# Patient Record
Sex: MALE | Race: WHITE | Employment: OTHER | URBAN - METROPOLITAN AREA
[De-identification: names, ages, dates, MRNs, and addresses within clinical notes are randomized per-mention and may not be internally consistent; named-entity substitution may affect disease eponyms.]

---

## 2017-11-01 ENCOUNTER — OFFICE VISIT (OUTPATIENT)
Dept: INTERNAL MEDICINE CLINIC | Age: 72
End: 2017-11-01
Payer: MEDICARE

## 2017-11-01 VITALS
DIASTOLIC BLOOD PRESSURE: 76 MMHG | BODY MASS INDEX: 27.2 KG/M2 | WEIGHT: 190 LBS | SYSTOLIC BLOOD PRESSURE: 118 MMHG | HEIGHT: 70 IN

## 2017-11-01 DIAGNOSIS — Z00.00 ROUTINE GENERAL MEDICAL EXAMINATION AT A HEALTH CARE FACILITY: ICD-10-CM

## 2017-11-01 DIAGNOSIS — Z23 NEED FOR VACCINATION: Primary | ICD-10-CM

## 2017-11-01 DIAGNOSIS — E78.5 HYPERLIPIDEMIA LDL GOAL <130: ICD-10-CM

## 2017-11-01 PROCEDURE — 90732 PPSV23 VACC 2 YRS+ SUBQ/IM: CPT | Performed by: INTERNAL MEDICINE

## 2017-11-01 PROCEDURE — G0009 ADMIN PNEUMOCOCCAL VACCINE: HCPCS | Performed by: INTERNAL MEDICINE

## 2017-11-01 PROCEDURE — 90662 IIV NO PRSV INCREASED AG IM: CPT | Performed by: INTERNAL MEDICINE

## 2017-11-01 PROCEDURE — G0008 ADMIN INFLUENZA VIRUS VAC: HCPCS | Performed by: INTERNAL MEDICINE

## 2017-11-01 PROCEDURE — G0438 PPPS, INITIAL VISIT: HCPCS | Performed by: INTERNAL MEDICINE

## 2017-11-01 RX ORDER — CLOTRIMAZOLE AND BETAMETHASONE DIPROPIONATE 10; .64 MG/G; MG/G
CREAM TOPICAL
Qty: 15 G | Refills: 0 | Status: SHIPPED | OUTPATIENT
Start: 2017-11-01 | End: 2018-06-20 | Stop reason: ALTCHOICE

## 2017-11-01 RX ORDER — ATORVASTATIN CALCIUM 10 MG/1
10 TABLET, FILM COATED ORAL DAILY
Qty: 30 TABLET | Refills: 3 | Status: SHIPPED | OUTPATIENT
Start: 2017-11-01 | End: 2018-02-06 | Stop reason: SDUPTHER

## 2017-11-01 ASSESSMENT — LIFESTYLE VARIABLES
HAVE YOU OR SOMEONE ELSE BEEN INJURED AS A RESULT OF YOUR DRINKING: 0
HOW OFTEN DURING THE LAST YEAR HAVE YOU HAD A FEELING OF GUILT OR REMORSE AFTER DRINKING: 0
HOW MANY STANDARD DRINKS CONTAINING ALCOHOL DO YOU HAVE ON A TYPICAL DAY: 0
HOW OFTEN DURING THE LAST YEAR HAVE YOU BEEN UNABLE TO REMEMBER WHAT HAPPENED THE NIGHT BEFORE BECAUSE YOU HAD BEEN DRINKING: 0
HOW OFTEN DO YOU HAVE A DRINK CONTAINING ALCOHOL: 2
HOW OFTEN DURING THE LAST YEAR HAVE YOU FAILED TO DO WHAT WAS NORMALLY EXPECTED FROM YOU BECAUSE OF DRINKING: 0
HAS A RELATIVE, FRIEND, DOCTOR, OR ANOTHER HEALTH PROFESSIONAL EXPRESSED CONCERN ABOUT YOUR DRINKING OR SUGGESTED YOU CUT DOWN: 0
HOW OFTEN DURING THE LAST YEAR HAVE YOU FOUND THAT YOU WERE NOT ABLE TO STOP DRINKING ONCE YOU HAD STARTED: 0
HOW OFTEN DO YOU HAVE SIX OR MORE DRINKS ON ONE OCCASION: 0
AUDIT-C TOTAL SCORE: 2
AUDIT TOTAL SCORE: 2
HOW OFTEN DURING THE LAST YEAR HAVE YOU NEEDED AN ALCOHOLIC DRINK FIRST THING IN THE MORNING TO GET YOURSELF GOING AFTER A NIGHT OF HEAVY DRINKING: 0

## 2017-11-01 ASSESSMENT — ANXIETY QUESTIONNAIRES: GAD7 TOTAL SCORE: 0

## 2017-11-01 ASSESSMENT — PATIENT HEALTH QUESTIONNAIRE - PHQ9: SUM OF ALL RESPONSES TO PHQ QUESTIONS 1-9: 0

## 2017-11-01 NOTE — PROGRESS NOTES
Subjective:      Patient ID: Elaine Ceja is a 71 y.o. male. Chronic Disease Visit Information    BP Readings from Last 3 Encounters:   06/15/16 126/84   05/31/16 130/82   08/27/15 116/78          LDL Cholesterol (mg/dL)   Date Value   08/27/2015 180 (H)     HDL (mg/dL)   Date Value   08/27/2015 63     BUN (mg/dL)   Date Value   08/27/2015 17     CREATININE (mg/dL)   Date Value   08/27/2015 0.75     Glucose (mg/dL)   Date Value   08/27/2015 89            Have you changed or started any medications since your last visit including any over-the-counter medicines, vitamins, or herbal medicines? no   Are you having any side effects from any of your medications? -  no  Have you stopped taking any of your medications? Is so, why? -  no    Have you seen any other physician or provider since your last visit? No  Have you had any other diagnostic tests since your last visit? No  Have you been seen in the emergency room and/or had an admission to a hospital since we last saw you? No  Have you had your annual diabetic retinal (eye) exam? Yes - Records Obtained  Have you had your routine dental cleaning in the past 6 months? no    Have you activated your [employer] account? If not, what are your barriers?  Yes     Patient Care Team:  Lucas Williamson MD as PCP - General  Fahad Hendrix MD as PCP - S Attributed Provider  Kenna Madden MD (Gastroenterology)  Fahad Hendrix MD (Internal Medicine)         Medical History Review  Past Medical, Family, and Social History reviewed and does contribute to the patient presenting condition    Health Maintenance   Topic Date Due    Hepatitis C screen  1945    Zostavax vaccine  09/23/2006    Pneumococcal low/med risk (2 of 2 - PPSV23) 06/15/2017    Flu vaccine (1) 09/01/2017    DTaP/Tdap/Td vaccine (1 - Tdap) 11/01/2018 (Originally 9/23/1965)    Lipid screen  08/27/2020    Colon cancer screen colonoscopy  11/15/2022       HPI    Review of Systems    Objective: coordination and speech normal    Patient's complete Health Risk Assessment and screening values have been reviewed and are found in Flowsheets. The following problems were reviewed today and where indicated follow up appointments were made and/or referrals ordered. Positive Risk Factor Screenings with Interventions:     General Health:  General  In general, how would you say your health is?: Excellent  In the past 7 days, have you experienced any of the following?: None of These  Do you get the social and emotional support that you need?: Yes  Do you have a Living Will?: (!) No  General Health Risk Interventions:      Health Habits/Nutrition:  Health Habits/Nutrition  Do you exercise for at least 20 minutes 2-3 times per week?: Yes  Have you lost any weight without trying in the past 3 months?: No  Do you eat fewer than 2 meals per day?: No  Have you seen a dentist within the past year?: (!) No  Body mass index is 26.9 kg/m². Health Habits/Nutrition Interventions:  · None indicated      Personalized Preventive Plan   Current Health Maintenance Status  Immunization History   Administered Date(s) Administered    Influenza, High Dose 11/01/2017    Pneumococcal 13-valent Conjugate ([id number]) 06/15/2016    Pneumococcal Polysaccharide ([id number]) 11/01/2017        Health Maintenance   Topic Date Due    Hepatitis C screen  1945    Zostavax vaccine  09/23/2006    DTaP/Tdap/Td vaccine (1 - Tdap) 11/01/2018 (Originally 9/23/1965)    Lipid screen  08/27/2020    Colon cancer screen colonoscopy  11/15/2022    Flu vaccine  Completed    Pneumococcal low/med risk  Completed     Recommendations for Preventive Services Due: see orders.   Recommended screening schedule for the next 5-10 years is provided to the patient in written form: see Patient Instructions/AVS.

## 2017-11-26 NOTE — PATIENT INSTRUCTIONS
Personalized Preventive Plan for Yoav Kendall - 11/1/2017  Medicare offers a range of preventive health benefits. Some of the tests and screenings are paid in full while other may be subject to a deductible, co-insurance, and/or copay. Some of these benefits include a comprehensive review of your medical history including lifestyle, illnesses that may run in your family, and various assessments and screenings as appropriate. After reviewing your medical record and screening and assessments performed today your provider may have ordered immunizations, labs, imaging, and/or referrals for you. A list of these orders (if applicable) as well as your Preventive Care list are included within your After Visit Summary for your review. Other Preventive Recommendations:    · A preventive eye exam performed by an eye specialist is recommended every 1-2 years to screen for glaucoma; cataracts, macular degeneration, and other eye disorders. · A preventive dental visit is recommended every 6 months. · Try to get at least 150 minutes of exercise per week or 10,000 steps per day on a pedometer . · Order or download the FREE \"Exercise & Physical Activity: Your Everyday Guide\" from [employer]. Call 6-854.836.9244 or search [employer] online. · You need 1493-9581 mg of calcium and 7912-7139 IU of vitamin D per day. It is possible to meet your calcium requirement with diet alone, but a vitamin D supplement is usually necessary to meet this goal.  · When exposed to the sun, use a sunscreen that protects against both UVA and UVB radiation with an SPF of 30 or greater. Reapply every 2 to 3 hours or after sweating, drying off with a towel, or swimming. · Always wear a seat belt when traveling in a car. Always wear a helmet when riding a bicycle or motorcycle. Personalized Preventive Plan for Yoav Kendall - 11/1/2017  Medicare offers a range of preventive health benefits.  Some of the tests and screenings are paid in full while other may be subject to a deductible, co-insurance, and/or copay. Some of these benefits include a comprehensive review of your medical history including lifestyle, illnesses that may run in your family, and various assessments and screenings as appropriate. After reviewing your medical record and screening and assessments performed today your provider may have ordered immunizations, labs, imaging, and/or referrals for you. A list of these orders (if applicable) as well as your Preventive Care list are included within your After Visit Summary for your review. Other Preventive Recommendations:    · A preventive eye exam performed by an eye specialist is recommended every 1-2 years to screen for glaucoma; cataracts, macular degeneration, and other eye disorders. · A preventive dental visit is recommended every 6 months. · Try to get at least 150 minutes of exercise per week or 10,000 steps per day on a pedometer . · Order or download the FREE \"Exercise & Physical Activity: Your Everyday Guide\" from [employer]. Call 8-399.517.3071 or search [employer] online. · You need 7267-3384 mg of calcium and 8976-1368 IU of vitamin D per day. It is possible to meet your calcium requirement with diet alone, but a vitamin D supplement is usually necessary to meet this goal.  · When exposed to the sun, use a sunscreen that protects against both UVA and UVB radiation with an SPF of 30 or greater. Reapply every 2 to 3 hours or after sweating, drying off with a towel, or swimming. · Always wear a seat belt when traveling in a car. Always wear a helmet when riding a bicycle or motorcycle.

## 2017-12-04 ENCOUNTER — HOSPITAL ENCOUNTER (OUTPATIENT)
Age: 72
Setting detail: SPECIMEN
Discharge: HOME | End: 2017-12-04
Payer: MEDICARE

## 2017-12-04 DIAGNOSIS — E78.5 HYPERLIPIDEMIA LDL GOAL <130: ICD-10-CM

## 2017-12-04 LAB
CHOLESTEROL/HDL RATIO: 2.4
CHOLESTEROL: 168 MG/DL
HDLC SERPL-MCNC: 71 MG/DL
LDL CHOLESTEROL: 76 MG/DL (ref 0–130)
TRIGL SERPL-MCNC: 104 MG/DL
VLDLC SERPL CALC-MCNC: NORMAL MG/DL (ref 1–30)

## 2018-01-29 ENCOUNTER — HOSPITAL ENCOUNTER (OUTPATIENT)
Age: 73
Setting detail: SPECIMEN
Discharge: HOME | End: 2018-01-29
Payer: MEDICARE

## 2018-01-29 DIAGNOSIS — E78.5 HYPERLIPIDEMIA LDL GOAL <130: ICD-10-CM

## 2018-01-29 LAB
ALBUMIN SERPL-MCNC: 4 G/DL (ref 3.5–5.2)
ALBUMIN/GLOBULIN RATIO: 1.2 (ref 1–2.5)
ALP BLD-CCNC: 88 U/L (ref 40–129)
ALT SERPL-CCNC: 21 U/L (ref 5–41)
ANION GAP SERPL CALCULATED.3IONS-SCNC: 9 MMOL/L (ref 9–17)
AST SERPL-CCNC: 20 U/L
BILIRUB SERPL-MCNC: 0.67 MG/DL (ref 0.3–1.2)
BUN BLDV-MCNC: 13 MG/DL (ref 8–23)
BUN/CREAT BLD: ABNORMAL (ref 9–20)
CALCIUM SERPL-MCNC: 8.5 MG/DL (ref 8.6–10.4)
CHLORIDE BLD-SCNC: 103 MMOL/L (ref 98–107)
CO2: 27 MMOL/L (ref 20–31)
CREAT SERPL-MCNC: 0.75 MG/DL (ref 0.7–1.2)
GFR AFRICAN AMERICAN: >60 ML/MIN
GFR NON-AFRICAN AMERICAN: >60 ML/MIN
GFR SERPL CREATININE-BSD FRML MDRD: ABNORMAL ML/MIN/{1.73_M2}
GFR SERPL CREATININE-BSD FRML MDRD: ABNORMAL ML/MIN/{1.73_M2}
GLUCOSE BLD-MCNC: 92 MG/DL (ref 70–99)
POTASSIUM SERPL-SCNC: 4.2 MMOL/L (ref 3.7–5.3)
SODIUM BLD-SCNC: 139 MMOL/L (ref 135–144)
TOTAL PROTEIN: 7.4 G/DL (ref 6.4–8.3)

## 2018-02-06 DIAGNOSIS — E78.5 HYPERLIPIDEMIA LDL GOAL <130: ICD-10-CM

## 2018-02-06 RX ORDER — ATORVASTATIN CALCIUM 10 MG/1
10 TABLET, FILM COATED ORAL DAILY
Qty: 90 TABLET | Refills: 3 | Status: SHIPPED | OUTPATIENT
Start: 2018-02-06 | End: 2018-02-12 | Stop reason: SDUPTHER

## 2018-02-12 DIAGNOSIS — E78.5 HYPERLIPIDEMIA LDL GOAL <130: ICD-10-CM

## 2018-02-12 NOTE — TELEPHONE ENCOUNTER
Medication: Atrovastatin  Last visit: 11/01/2017  Next visit: Visit date not found  Last refill: 2/6/2018  Pharmacy: [facility]

## 2018-02-13 RX ORDER — ATORVASTATIN CALCIUM 10 MG/1
10 TABLET, FILM COATED ORAL DAILY
Qty: 90 TABLET | Refills: 3 | Status: SHIPPED | OUTPATIENT
Start: 2018-02-13 | End: 2019-03-07 | Stop reason: SDUPTHER

## 2018-06-20 ENCOUNTER — OFFICE VISIT (OUTPATIENT)
Dept: INTERNAL MEDICINE CLINIC | Age: 73
End: 2018-06-20
Payer: MEDICARE

## 2018-06-20 VITALS
BODY MASS INDEX: 27.21 KG/M2 | HEIGHT: 70 IN | SYSTOLIC BLOOD PRESSURE: 130 MMHG | WEIGHT: 190.04 LBS | DIASTOLIC BLOOD PRESSURE: 76 MMHG

## 2018-06-20 DIAGNOSIS — H81.13 BENIGN PAROXYSMAL POSITIONAL VERTIGO DUE TO BILATERAL VESTIBULAR DISORDER: Primary | ICD-10-CM

## 2018-06-20 PROCEDURE — 3017F COLORECTAL CA SCREEN DOC REV: CPT | Performed by: INTERNAL MEDICINE

## 2018-06-20 PROCEDURE — G8419 CALC BMI OUT NRM PARAM NOF/U: HCPCS | Performed by: INTERNAL MEDICINE

## 2018-06-20 PROCEDURE — 99213 OFFICE O/P EST LOW 20 MIN: CPT | Performed by: INTERNAL MEDICINE

## 2018-06-20 PROCEDURE — 1123F ACP DISCUSS/DSCN MKR DOCD: CPT | Performed by: INTERNAL MEDICINE

## 2018-06-20 PROCEDURE — 1036F TOBACCO NON-USER: CPT | Performed by: INTERNAL MEDICINE

## 2018-06-20 PROCEDURE — G8427 DOCREV CUR MEDS BY ELIG CLIN: HCPCS | Performed by: INTERNAL MEDICINE

## 2018-06-20 PROCEDURE — 4040F PNEUMOC VAC/ADMIN/RCVD: CPT | Performed by: INTERNAL MEDICINE

## 2018-06-20 PROCEDURE — 1101F PT FALLS ASSESS-DOCD LE1/YR: CPT | Performed by: INTERNAL MEDICINE

## 2018-06-20 RX ORDER — MECLIZINE HYDROCHLORIDE CHEWABLE TABLETS 25 MG/1
25 TABLET, CHEWABLE ORAL 3 TIMES DAILY PRN
Qty: 30 TABLET | Refills: 0 | Status: SHIPPED | OUTPATIENT
Start: 2018-06-20 | End: 2018-06-30

## 2018-08-08 ENCOUNTER — OFFICE VISIT (OUTPATIENT)
Dept: INTERNAL MEDICINE CLINIC | Age: 73
End: 2018-08-08
Payer: MEDICARE

## 2018-08-08 ENCOUNTER — HOSPITAL ENCOUNTER (OUTPATIENT)
Age: 73
Setting detail: SPECIMEN
Discharge: HOME | End: 2018-08-08
Payer: MEDICARE

## 2018-08-08 ENCOUNTER — HOSPITAL ENCOUNTER (OUTPATIENT)
Facility: CLINIC | Age: 73
Discharge: HOME | End: 2018-08-10
Payer: MEDICARE

## 2018-08-08 ENCOUNTER — HOSPITAL ENCOUNTER (OUTPATIENT)
Dept: GENERAL RADIOLOGY | Facility: CLINIC | Age: 73
Discharge: HOME | End: 2018-08-10
Payer: MEDICARE

## 2018-08-08 VITALS
SYSTOLIC BLOOD PRESSURE: 158 MMHG | HEIGHT: 70 IN | WEIGHT: 189 LBS | DIASTOLIC BLOOD PRESSURE: 86 MMHG | BODY MASS INDEX: 27.06 KG/M2 | RESPIRATION RATE: 14 BRPM

## 2018-08-08 DIAGNOSIS — E78.5 DYSLIPIDEMIA: ICD-10-CM

## 2018-08-08 DIAGNOSIS — J13 PNEUMONIA OF LEFT LOWER LOBE DUE TO STREPTOCOCCUS PNEUMONIAE (HCC): ICD-10-CM

## 2018-08-08 LAB
ABSOLUTE EOS #: 0.45 K/UL (ref 0–0.44)
ABSOLUTE IMMATURE GRANULOCYTE: 0.03 K/UL (ref 0–0.3)
ABSOLUTE LYMPH #: 1.79 K/UL (ref 1.1–3.7)
ABSOLUTE MONO #: 0.9 K/UL (ref 0.1–1.2)
BASOPHILS # BLD: 1 % (ref 0–2)
BASOPHILS ABSOLUTE: 0.07 K/UL (ref 0–0.2)
DIFFERENTIAL TYPE: ABNORMAL
EOSINOPHILS RELATIVE PERCENT: 5 % (ref 1–4)
HCT VFR BLD CALC: 47.7 % (ref 40.7–50.3)
HEMOGLOBIN: 15.6 G/DL (ref 13–17)
IMMATURE GRANULOCYTES: 0 %
LYMPHOCYTES # BLD: 21 % (ref 24–43)
MCH RBC QN AUTO: 30.5 PG (ref 25.2–33.5)
MCHC RBC AUTO-ENTMCNC: 32.7 G/DL (ref 28.4–34.8)
MCV RBC AUTO: 93.3 FL (ref 82.6–102.9)
MONOCYTES # BLD: 10 % (ref 3–12)
NRBC AUTOMATED: 0 PER 100 WBC
PDW BLD-RTO: 12.9 % (ref 11.8–14.4)
PLATELET # BLD: 232 K/UL (ref 138–453)
PLATELET ESTIMATE: ABNORMAL
PMV BLD AUTO: 10.5 FL (ref 8.1–13.5)
RBC # BLD: 5.11 M/UL (ref 4.21–5.77)
RBC # BLD: ABNORMAL 10*6/UL
SEDIMENTATION RATE, ERYTHROCYTE: 1 MM (ref 0–10)
SEG NEUTROPHILS: 63 % (ref 36–65)
SEGMENTED NEUTROPHILS ABSOLUTE COUNT: 5.42 K/UL (ref 1.5–8.1)
WBC # BLD: 8.7 K/UL (ref 3.5–11.3)
WBC # BLD: ABNORMAL 10*3/UL

## 2018-08-08 PROCEDURE — 71046 X-RAY EXAM CHEST 2 VIEWS: CPT

## 2018-08-08 PROCEDURE — 99213 OFFICE O/P EST LOW 20 MIN: CPT | Performed by: INTERNAL MEDICINE

## 2018-08-08 PROCEDURE — G8419 CALC BMI OUT NRM PARAM NOF/U: HCPCS | Performed by: INTERNAL MEDICINE

## 2018-08-08 PROCEDURE — G8427 DOCREV CUR MEDS BY ELIG CLIN: HCPCS | Performed by: INTERNAL MEDICINE

## 2018-08-08 PROCEDURE — 1036F TOBACCO NON-USER: CPT | Performed by: INTERNAL MEDICINE

## 2018-08-08 PROCEDURE — 1101F PT FALLS ASSESS-DOCD LE1/YR: CPT | Performed by: INTERNAL MEDICINE

## 2018-08-08 PROCEDURE — 1123F ACP DISCUSS/DSCN MKR DOCD: CPT | Performed by: INTERNAL MEDICINE

## 2018-08-08 PROCEDURE — 3017F COLORECTAL CA SCREEN DOC REV: CPT | Performed by: INTERNAL MEDICINE

## 2018-08-08 PROCEDURE — 4040F PNEUMOC VAC/ADMIN/RCVD: CPT | Performed by: INTERNAL MEDICINE

## 2018-08-08 RX ORDER — BENZONATATE 200 MG/1
200 CAPSULE ORAL 3 TIMES DAILY PRN
Qty: 15 CAPSULE | Refills: 0 | Status: SHIPPED | OUTPATIENT
Start: 2018-08-08 | End: 2018-08-13

## 2018-08-08 RX ORDER — LEVOFLOXACIN 500 MG/1
500 TABLET, FILM COATED ORAL DAILY
Qty: 10 TABLET | Refills: 0 | Status: SHIPPED | OUTPATIENT
Start: 2018-08-08 | End: 2018-08-18

## 2018-08-08 ASSESSMENT — ENCOUNTER SYMPTOMS
ALLERGIC/IMMUNOLOGIC NEGATIVE: 1
GASTROINTESTINAL NEGATIVE: 1
COUGH: 1
EYES NEGATIVE: 1

## 2018-08-08 NOTE — PROGRESS NOTES
Date:   8/8/2019    Sedimentation rate, automated     Standing Status:   Future     Standing Expiration Date:   8/8/2019     Orders Placed This Encounter   Medications    levofloxacin (LEVAQUIN) 500 MG tablet     Sig: Take 1 tablet by mouth daily for 10 days     Dispense:  10 tablet     Refill:  0    benzonatate (TESSALON) 200 MG capsule     Sig: Take 1 capsule by mouth 3 times daily as needed for Cough     Dispense:  15 capsule     Refill:  0

## 2019-01-04 ENCOUNTER — OFFICE VISIT (OUTPATIENT)
Dept: FAMILY MEDICINE CLINIC | Age: 74
End: 2019-01-04
Payer: MEDICARE

## 2019-01-04 VITALS
BODY MASS INDEX: 27.75 KG/M2 | HEIGHT: 70 IN | OXYGEN SATURATION: 98 % | HEART RATE: 72 BPM | DIASTOLIC BLOOD PRESSURE: 72 MMHG | WEIGHT: 193.8 LBS | SYSTOLIC BLOOD PRESSURE: 114 MMHG

## 2019-01-04 DIAGNOSIS — E78.5 DYSLIPIDEMIA: Primary | ICD-10-CM

## 2019-01-04 DIAGNOSIS — Z23 NEEDS FLU SHOT: ICD-10-CM

## 2019-01-04 LAB
ALBUMIN SERPL-MCNC: 4.5 G/DL (ref 3.4–5)
ALP BLD-CCNC: 94 U/L (ref 40–129)
ALT SERPL-CCNC: 41 U/L (ref 10–40)
AST SERPL-CCNC: 25 U/L (ref 15–37)
BILIRUB SERPL-MCNC: 0.7 MG/DL (ref 0–1)
BILIRUBIN DIRECT: <0.2 MG/DL (ref 0–0.3)
BILIRUBIN, INDIRECT: ABNORMAL MG/DL (ref 0–1)
CHOLESTEROL, TOTAL: 131 MG/DL (ref 0–199)
HDLC SERPL-MCNC: 42 MG/DL (ref 40–60)
LDL CHOLESTEROL CALCULATED: 71 MG/DL
TOTAL PROTEIN: 7.5 G/DL (ref 6.4–8.2)
TRIGL SERPL-MCNC: 92 MG/DL (ref 0–150)
VLDLC SERPL CALC-MCNC: 18 MG/DL

## 2019-01-04 PROCEDURE — 36415 COLL VENOUS BLD VENIPUNCTURE: CPT | Performed by: FAMILY MEDICINE

## 2019-01-04 PROCEDURE — 1036F TOBACCO NON-USER: CPT | Performed by: FAMILY MEDICINE

## 2019-01-04 PROCEDURE — 99202 OFFICE O/P NEW SF 15 MIN: CPT | Performed by: FAMILY MEDICINE

## 2019-01-04 PROCEDURE — 90662 IIV NO PRSV INCREASED AG IM: CPT | Performed by: FAMILY MEDICINE

## 2019-01-04 PROCEDURE — 1123F ACP DISCUSS/DSCN MKR DOCD: CPT | Performed by: FAMILY MEDICINE

## 2019-01-04 PROCEDURE — 3017F COLORECTAL CA SCREEN DOC REV: CPT | Performed by: FAMILY MEDICINE

## 2019-01-04 PROCEDURE — G8482 FLU IMMUNIZE ORDER/ADMIN: HCPCS | Performed by: FAMILY MEDICINE

## 2019-01-04 PROCEDURE — 1101F PT FALLS ASSESS-DOCD LE1/YR: CPT | Performed by: FAMILY MEDICINE

## 2019-01-04 PROCEDURE — G8419 CALC BMI OUT NRM PARAM NOF/U: HCPCS | Performed by: FAMILY MEDICINE

## 2019-01-04 PROCEDURE — 4040F PNEUMOC VAC/ADMIN/RCVD: CPT | Performed by: FAMILY MEDICINE

## 2019-01-04 PROCEDURE — G8427 DOCREV CUR MEDS BY ELIG CLIN: HCPCS | Performed by: FAMILY MEDICINE

## 2019-01-04 PROCEDURE — G0008 ADMIN INFLUENZA VIRUS VAC: HCPCS | Performed by: FAMILY MEDICINE

## 2019-01-04 ASSESSMENT — PATIENT HEALTH QUESTIONNAIRE - PHQ9
SUM OF ALL RESPONSES TO PHQ QUESTIONS 1-9: 0
2. FEELING DOWN, DEPRESSED OR HOPELESS: 0
1. LITTLE INTEREST OR PLEASURE IN DOING THINGS: 0
SUM OF ALL RESPONSES TO PHQ9 QUESTIONS 1 & 2: 0
SUM OF ALL RESPONSES TO PHQ QUESTIONS 1-9: 0

## 2019-01-06 ASSESSMENT — ENCOUNTER SYMPTOMS
ABDOMINAL PAIN: 0
SHORTNESS OF BREATH: 0

## 2019-03-06 ENCOUNTER — TELEPHONE (OUTPATIENT)
Dept: FAMILY MEDICINE CLINIC | Age: 74
End: 2019-03-06

## 2019-03-06 DIAGNOSIS — E78.5 HYPERLIPIDEMIA LDL GOAL <130: ICD-10-CM

## 2019-03-07 RX ORDER — ATORVASTATIN CALCIUM 10 MG/1
10 TABLET, FILM COATED ORAL DAILY
Qty: 90 TABLET | Refills: 3 | Status: SHIPPED | OUTPATIENT
Start: 2019-03-07 | End: 2020-03-04 | Stop reason: SDUPTHER

## 2020-01-06 ENCOUNTER — OFFICE VISIT (OUTPATIENT)
Dept: FAMILY MEDICINE CLINIC | Age: 75
End: 2020-01-06
Payer: MEDICARE

## 2020-01-06 ENCOUNTER — TELEPHONE (OUTPATIENT)
Dept: FAMILY MEDICINE CLINIC | Age: 75
End: 2020-01-06

## 2020-01-06 VITALS
HEIGHT: 70 IN | BODY MASS INDEX: 26.8 KG/M2 | HEART RATE: 50 BPM | OXYGEN SATURATION: 98 % | SYSTOLIC BLOOD PRESSURE: 124 MMHG | DIASTOLIC BLOOD PRESSURE: 70 MMHG | WEIGHT: 187.2 LBS

## 2020-01-06 LAB
CHOLESTEROL, TOTAL: 154 MG/DL (ref 0–199)
HDLC SERPL-MCNC: 61 MG/DL (ref 40–60)
LDL CHOLESTEROL CALCULATED: 79 MG/DL
PROSTATE SPECIFIC ANTIGEN: 1.6 NG/ML (ref 0–4)
TRIGL SERPL-MCNC: 68 MG/DL (ref 0–150)
VLDLC SERPL CALC-MCNC: 14 MG/DL

## 2020-01-06 PROCEDURE — 3017F COLORECTAL CA SCREEN DOC REV: CPT | Performed by: FAMILY MEDICINE

## 2020-01-06 PROCEDURE — G8484 FLU IMMUNIZE NO ADMIN: HCPCS | Performed by: FAMILY MEDICINE

## 2020-01-06 PROCEDURE — 36415 COLL VENOUS BLD VENIPUNCTURE: CPT | Performed by: FAMILY MEDICINE

## 2020-01-06 PROCEDURE — 4040F PNEUMOC VAC/ADMIN/RCVD: CPT | Performed by: FAMILY MEDICINE

## 2020-01-06 PROCEDURE — G0439 PPPS, SUBSEQ VISIT: HCPCS | Performed by: FAMILY MEDICINE

## 2020-01-06 PROCEDURE — 1123F ACP DISCUSS/DSCN MKR DOCD: CPT | Performed by: FAMILY MEDICINE

## 2020-01-06 ASSESSMENT — LIFESTYLE VARIABLES
HOW OFTEN DURING THE LAST YEAR HAVE YOU FOUND THAT YOU WERE NOT ABLE TO STOP DRINKING ONCE YOU HAD STARTED: 0
HOW OFTEN DURING THE LAST YEAR HAVE YOU BEEN UNABLE TO REMEMBER WHAT HAPPENED THE NIGHT BEFORE BECAUSE YOU HAD BEEN DRINKING: 0
HOW MANY STANDARD DRINKS CONTAINING ALCOHOL DO YOU HAVE ON A TYPICAL DAY: 0
HAVE YOU OR SOMEONE ELSE BEEN INJURED AS A RESULT OF YOUR DRINKING: 0
HOW OFTEN DURING THE LAST YEAR HAVE YOU NEEDED AN ALCOHOLIC DRINK FIRST THING IN THE MORNING TO GET YOURSELF GOING AFTER A NIGHT OF HEAVY DRINKING: 0
AUDIT TOTAL SCORE: 2
HOW OFTEN DO YOU HAVE A DRINK CONTAINING ALCOHOL: 2
HOW OFTEN DURING THE LAST YEAR HAVE YOU HAD A FEELING OF GUILT OR REMORSE AFTER DRINKING: 0
HAS A RELATIVE, FRIEND, DOCTOR, OR ANOTHER HEALTH PROFESSIONAL EXPRESSED CONCERN ABOUT YOUR DRINKING OR SUGGESTED YOU CUT DOWN: 0
HOW OFTEN DURING THE LAST YEAR HAVE YOU FAILED TO DO WHAT WAS NORMALLY EXPECTED FROM YOU BECAUSE OF DRINKING: 0
AUDIT-C TOTAL SCORE: 2
HOW OFTEN DO YOU HAVE SIX OR MORE DRINKS ON ONE OCCASION: 0

## 2020-01-06 ASSESSMENT — PATIENT HEALTH QUESTIONNAIRE - PHQ9
SUM OF ALL RESPONSES TO PHQ QUESTIONS 1-9: 0
SUM OF ALL RESPONSES TO PHQ QUESTIONS 1-9: 0

## 2020-01-06 NOTE — PROGRESS NOTES
Medicare Annual Wellness Visit  Name: Celso Rodriguez Date: 2020   MRN: <[id number]> Sex: Male   Age: 69 y.o. Ethnicity: Non-/Non    : 1945 Race: Lico Avelar is here for Medicare AWV (pt states that he is here for an annual wellness visit, is fasting for bw )    Screenings for behavioral, psychosocial and functional/safety risks, and cognitive dysfunction are all negative except as indicated below. These results, as well as other patient data from the [address] form, are documented in Flowsheets linked to this Encounter. Allergies   Allergen Reactions    Tramadol Hcl Er Other (See Comments)     Causes more pain that he had before. Prior to Visit Medications    Medication Sig Taking? Authorizing Provider   atorvastatin (LIPITOR) 10 MG tablet Take 1 tablet by mouth daily Yes Carol Brooks MD       Past Medical History:   Diagnosis Date    Elevated blood pressure reading without diagnosis of hypertension        Past Surgical History:   Procedure Laterality Date    FINGER AMPUTATION Right     middle finger    KNEE CARTILAGE SURGERY Left     SHOULDER SURGERY Right     bone spur removed.      THUMB AMPUTATION Left     tip of thumb       Family History   Problem Relation Age of Onset    Heart Disease Mother     High Cholesterol Mother     Heart Disease Father     Heart Disease Maternal Grandmother     High Blood Pressure Brother        CareTeam (Including outside providers/suppliers regularly involved in providing care):   Patient Care Team:  Carol Brooks MD as PCP - General (Family Medicine)  Carol Brooks MD as PCP - [facility] Empaneled Provider  Robbie Feldman MD (Gastroenterology)  Aleksandr Payne MD (Internal Medicine)  KASSI Mchugh - NP (Nurse Practitioner)    Wt Readings from Last 3 Encounters:   20 187 lb 3.2 oz (84.9 kg)   19 193 lb 12.8 oz (87.9 kg)   18 189 lb (85.7 kg)     Vitals:    20 0372 BP: 124/70   Pulse: 50   SpO2: 98%   Weight: 187 lb 3.2 oz (84.9 kg)   Height: 5' 10\" (1.778 m)     Body mass index is 26.86 kg/m². Based upon direct observation of the patient, evaluation of cognition reveals recent and remote memory intact. After Dilaudid - reaction, so kept overnight as precaution, no problems. L thumb healed. Up 0-1 times/night (nocturia), and usually sleeps fair, frequent awakening, usually NOT needing to urinate. Energy good, and walks dog 25 miles/week, about 1-1.5 hrs/day (walks dog twice/day, 3.5 miles). Also morning stretches, and every other morning, 20 situps and 20 pushups. Diet - lowfat, fair amount fruits, limited veggies (lies with son, is single), limited red meat, some sweets, no pop, mainly water, some OJ, Vit C. Flu shots on occasion - defers. L jaw pain at times. Dentist  On regular basis, and Aleve on occasion. General Appearance: alert and oriented to person, place and time, well-developed and well-nourished, in no acute distress  Neck: neck supple and non tender without mass, no thyromegaly or thyroid nodules, no cervical lymphadenopathy   Pulmonary/Chest: clear to auscultation bilaterally- no wheezes, rales or rhonchi, normal air movement, no respiratory distress  Cardiovascular: normal rate, regular rhythm, normal S1 and S2 and intact distal pulses  Abdomen: soft, non-tender, non-distended, normal bowel sounds, no masses or organomegaly  Extremities: no cyanosis, clubbing or edema    Patient's complete Health Risk Assessment and screening values have been reviewed and are found in Flowsheets. The following problems were reviewed today and where indicated follow up appointments were made and/or referrals ordered. Positive Risk Factor Screenings with Interventions:     General Health:  General  In general, how would you say your health is?: Excellent  In the past 7 days, have you experienced any of the following?  New or Increased Pain, New or Increased Fatigue, Loneliness, Social Isolation, Stress or Anger?: None of These  Do you get the social and emotional support that you need?: Yes  Do you have a Living Will?: (!) No  General Health Risk Interventions:  · No Living Will: patient declined    Hearing/Vision:  No exam data present  Hearing/Vision  Do you or your family notice any trouble with your hearing?: No  Do you have difficulty driving, watching TV, or doing any of your daily activities because of your eyesight?: No  Have you had an eye exam within the past year?: (!) No  Hearing/Vision Interventions:  · Vision concerns:  patient declines any further evaluation/treatment for this issue    Personalized Preventive Plan   Current Health Maintenance Status  Immunization History   Administered Date(s) Administered    DTaP vaccine 06/08/2008    Influenza, High Dose (Fluzone 65 yrs and older) 11/01/2017, 01/04/2019    Pneumococcal Conjugate 13-valent ([id number]) 06/15/2016    Pneumococcal Conjugate 7-valent (Prevnar7) 06/08/2008    Pneumococcal Polysaccharide (Gkncjuhno21) 11/01/2017    Tdap (Boostrix, Adacel) 11/04/2019        Health Maintenance   Topic Date Due    Hepatitis C screen  1945    Shingles Vaccine (1 of 2) 09/23/1996    Annual Wellness Visit (AWV)  05/29/2019    Flu vaccine (1) 09/01/2019    Lipid screen  01/04/2020    Colon cancer screen colonoscopy  11/15/2022    DTaP/Tdap/Td vaccine (3 - Td) 11/04/2029    Pneumococcal 65+ years Vaccine  Completed     Recommendations for Preventive Services Due: see orders and patient instructions/AVS.  . Recommended screening schedule for the next 5-10 years is provided to the patient in written form: see Patient Instructions/AVS.    There are no diagnoses linked to this encounter.

## 2020-03-04 RX ORDER — ATORVASTATIN CALCIUM 10 MG/1
10 TABLET, FILM COATED ORAL DAILY
Qty: 90 TABLET | Refills: 0 | Status: SHIPPED | OUTPATIENT
Start: 2020-03-04 | End: 2020-06-02 | Stop reason: SDUPTHER

## 2020-06-02 DIAGNOSIS — E78.5 HYPERLIPIDEMIA LDL GOAL <130: ICD-10-CM

## 2020-06-02 RX ORDER — ATORVASTATIN CALCIUM 10 MG/1
10 TABLET, FILM COATED ORAL DAILY
Qty: 30 TABLET | Refills: 5 | Status: SHIPPED | OUTPATIENT
Start: 2020-06-02 | End: 2020-11-25 | Stop reason: SDUPTHER

## 2020-06-02 NOTE — TELEPHONE ENCOUNTER
Vivek Sawyer [phone] (home)    is requesting refill(s) of medication Atorvastatin  to preferred pharmacy   [address], [address] [facility] 01-06-20 (pertaining to medication)   Last refill 03-04-20 (per medication requested)  Next office visit scheduled or attempted Yes  Date 07-07-20  If No, reason made

## 2020-07-07 ENCOUNTER — OFFICE VISIT (OUTPATIENT)
Dept: FAMILY MEDICINE CLINIC | Age: 75
End: 2020-07-07
Payer: MEDICARE

## 2020-07-07 VITALS
TEMPERATURE: 98.7 F | HEIGHT: 70 IN | OXYGEN SATURATION: 99 % | BODY MASS INDEX: 25.86 KG/M2 | DIASTOLIC BLOOD PRESSURE: 80 MMHG | WEIGHT: 180.6 LBS | HEART RATE: 69 BPM | SYSTOLIC BLOOD PRESSURE: 138 MMHG

## 2020-07-07 LAB
A/G RATIO: 1.6 (ref 1.1–2.2)
ALBUMIN SERPL-MCNC: 4.5 G/DL (ref 3.4–5)
ALP BLD-CCNC: 69 U/L (ref 40–129)
ALT SERPL-CCNC: 19 U/L (ref 10–40)
ANION GAP SERPL CALCULATED.3IONS-SCNC: 12 MMOL/L (ref 3–16)
AST SERPL-CCNC: 21 U/L (ref 15–37)
BILIRUB SERPL-MCNC: 0.9 MG/DL (ref 0–1)
BUN BLDV-MCNC: 22 MG/DL (ref 7–20)
CALCIUM SERPL-MCNC: 9.1 MG/DL (ref 8.3–10.6)
CHLORIDE BLD-SCNC: 102 MMOL/L (ref 99–110)
CHOLESTEROL, TOTAL: 152 MG/DL (ref 0–199)
CO2: 25 MMOL/L (ref 21–32)
CREAT SERPL-MCNC: 0.9 MG/DL (ref 0.8–1.3)
GFR AFRICAN AMERICAN: >60
GFR NON-AFRICAN AMERICAN: >60
GLOBULIN: 2.8 G/DL
GLUCOSE BLD-MCNC: 94 MG/DL (ref 70–99)
HDLC SERPL-MCNC: 61 MG/DL (ref 40–60)
LDL CHOLESTEROL CALCULATED: 74 MG/DL
POTASSIUM SERPL-SCNC: 4.4 MMOL/L (ref 3.5–5.1)
SODIUM BLD-SCNC: 139 MMOL/L (ref 136–145)
TOTAL PROTEIN: 7.3 G/DL (ref 6.4–8.2)
TRIGL SERPL-MCNC: 83 MG/DL (ref 0–150)
VLDLC SERPL CALC-MCNC: 17 MG/DL

## 2020-07-07 PROCEDURE — G8427 DOCREV CUR MEDS BY ELIG CLIN: HCPCS | Performed by: FAMILY MEDICINE

## 2020-07-07 PROCEDURE — 3017F COLORECTAL CA SCREEN DOC REV: CPT | Performed by: FAMILY MEDICINE

## 2020-07-07 PROCEDURE — G8417 CALC BMI ABV UP PARAM F/U: HCPCS | Performed by: FAMILY MEDICINE

## 2020-07-07 PROCEDURE — 4040F PNEUMOC VAC/ADMIN/RCVD: CPT | Performed by: FAMILY MEDICINE

## 2020-07-07 PROCEDURE — 99213 OFFICE O/P EST LOW 20 MIN: CPT | Performed by: FAMILY MEDICINE

## 2020-07-07 PROCEDURE — 36415 COLL VENOUS BLD VENIPUNCTURE: CPT | Performed by: FAMILY MEDICINE

## 2020-07-07 PROCEDURE — 1123F ACP DISCUSS/DSCN MKR DOCD: CPT | Performed by: FAMILY MEDICINE

## 2020-07-07 PROCEDURE — 1036F TOBACCO NON-USER: CPT | Performed by: FAMILY MEDICINE

## 2020-07-07 ASSESSMENT — ENCOUNTER SYMPTOMS
BLOOD IN STOOL: 0
VOMITING: 0
EYE PAIN: 0
CHEST TIGHTNESS: 0
CONSTIPATION: 0
WHEEZING: 0
RHINORRHEA: 0
COUGH: 0
EYE DISCHARGE: 0
SHORTNESS OF BREATH: 0
ABDOMINAL PAIN: 0
DIARRHEA: 0
SINUS PRESSURE: 0

## 2020-07-07 NOTE — PROGRESS NOTES
Conjunctivae normal.      Pupils: Pupils are equal, round, and reactive to light. Neck:      Musculoskeletal: Neck supple. Thyroid: No thyromegaly. Cardiovascular:      Rate and Rhythm: Normal rate and regular rhythm. Heart sounds: Normal heart sounds. No murmur. Pulmonary:      Effort: Pulmonary effort is normal.      Breath sounds: Normal breath sounds. No wheezing. Abdominal:      General: Bowel sounds are normal. There is no distension. Palpations: Abdomen is soft. Tenderness: There is no abdominal tenderness. There is no guarding or rebound. Musculoskeletal: Normal range of motion. General: No swelling, tenderness, deformity or signs of injury. Right lower leg: No edema. Left lower leg: No edema. Lymphadenopathy:      Cervical: No cervical adenopathy. Skin:     General: Skin is warm. Coloration: Skin is not jaundiced. Findings: No bruising, erythema, lesion or rash. Neurological:      General: No focal deficit present. Mental Status: He is alert and oriented to person, place, and time. Cranial Nerves: No cranial nerve deficit. Sensory: No sensory deficit. Motor: No weakness. Coordination: Coordination normal.   Psychiatric:         Behavior: Behavior normal.         Thought Content:  Thought content normal.         Judgment: Judgment normal.         Assessment:      hld- on statin- check labs  Left hip pain- check xray      Plan:       Orders Placed This Encounter   Procedures    XR HIP LEFT (2-3 VIEWS)     Standing Status:   Future     Standing Expiration Date:   7/7/2021     Order Specific Question:   Reason for exam:     Answer:   left hip pain    LIPID PANEL     Order Specific Question:   Is Patient Fasting?/# of Hours     Answer:   12    COMPREHENSIVE METABOLIC PANEL     rto  1 yr awv/checkup          AGNES Briones [address] DO EVANGELINA

## 2020-07-07 NOTE — PATIENT INSTRUCTIONS

## 2020-11-24 NOTE — TELEPHONE ENCOUNTER
Edouard Gomez [phone] (home)    is requesting refill(s) of medication Atorvastatin to preferred pharmacy [facility] 7/7/20 (pertaining to medication)   Last refill 6/2/20 (per medication requested)  Next office visit scheduled or attempted Yes  Date 7/8/21  If No, reason

## 2020-11-24 NOTE — TELEPHONE ENCOUNTER
Libertad Conti is requesting refill(s) Atorvastatin   Pharmacy: [address] [place] [address]  Ph. [phone]  Last OV 7-7-20 (pertaining to medication)  LR 6-2-20 (per medication requested)  Next office visit scheduled or attempted Yes   If no, reason:  Made, 7-8-21

## 2020-11-25 RX ORDER — ATORVASTATIN CALCIUM 10 MG/1
TABLET, FILM COATED ORAL
Qty: 30 TABLET | Refills: 2 | Status: SHIPPED | OUTPATIENT
Start: 2020-11-25 | End: 2021-03-17 | Stop reason: SDUPTHER

## 2020-11-25 RX ORDER — ATORVASTATIN CALCIUM 10 MG/1
10 TABLET, FILM COATED ORAL DAILY
Qty: 30 TABLET | Refills: 5 | Status: SHIPPED | OUTPATIENT
Start: 2020-11-25 | End: 2021-03-19 | Stop reason: SDUPTHER

## 2021-03-17 ENCOUNTER — OFFICE VISIT (OUTPATIENT)
Dept: FAMILY MEDICINE CLINIC | Age: 76
End: 2021-03-17
Payer: MEDICARE

## 2021-03-17 VITALS
BODY MASS INDEX: 27.26 KG/M2 | RESPIRATION RATE: 16 BRPM | TEMPERATURE: 98.2 F | DIASTOLIC BLOOD PRESSURE: 76 MMHG | OXYGEN SATURATION: 98 % | HEIGHT: 70 IN | WEIGHT: 190.4 LBS | HEART RATE: 79 BPM | SYSTOLIC BLOOD PRESSURE: 138 MMHG

## 2021-03-17 DIAGNOSIS — M54.50 ACUTE MIDLINE LOW BACK PAIN WITHOUT SCIATICA: Primary | ICD-10-CM

## 2021-03-17 PROCEDURE — 3288F FALL RISK ASSESSMENT DOCD: CPT | Performed by: PHYSICIAN ASSISTANT

## 2021-03-17 PROCEDURE — G8417 CALC BMI ABV UP PARAM F/U: HCPCS | Performed by: PHYSICIAN ASSISTANT

## 2021-03-17 PROCEDURE — 4040F PNEUMOC VAC/ADMIN/RCVD: CPT | Performed by: PHYSICIAN ASSISTANT

## 2021-03-17 PROCEDURE — 3017F COLORECTAL CA SCREEN DOC REV: CPT | Performed by: PHYSICIAN ASSISTANT

## 2021-03-17 PROCEDURE — 1123F ACP DISCUSS/DSCN MKR DOCD: CPT | Performed by: PHYSICIAN ASSISTANT

## 2021-03-17 PROCEDURE — G8484 FLU IMMUNIZE NO ADMIN: HCPCS | Performed by: PHYSICIAN ASSISTANT

## 2021-03-17 PROCEDURE — G8427 DOCREV CUR MEDS BY ELIG CLIN: HCPCS | Performed by: PHYSICIAN ASSISTANT

## 2021-03-17 PROCEDURE — 99213 OFFICE O/P EST LOW 20 MIN: CPT | Performed by: PHYSICIAN ASSISTANT

## 2021-03-17 PROCEDURE — 1036F TOBACCO NON-USER: CPT | Performed by: PHYSICIAN ASSISTANT

## 2021-03-17 RX ORDER — DICLOFENAC POTASSIUM 50 MG/1
50 TABLET, FILM COATED ORAL 3 TIMES DAILY
Qty: 21 TABLET | Refills: 0 | Status: SHIPPED | OUTPATIENT
Start: 2021-03-17 | End: 2021-07-08 | Stop reason: ALTCHOICE

## 2021-03-17 RX ORDER — TIZANIDINE HYDROCHLORIDE 2 MG/1
2 CAPSULE, GELATIN COATED ORAL 3 TIMES DAILY PRN
Qty: 30 CAPSULE | Refills: 0 | Status: SHIPPED | OUTPATIENT
Start: 2021-03-17 | End: 2021-03-27

## 2021-03-17 SDOH — ECONOMIC STABILITY: TRANSPORTATION INSECURITY
IN THE PAST 12 MONTHS, HAS LACK OF TRANSPORTATION KEPT YOU FROM MEETINGS, WORK, OR FROM GETTING THINGS NEEDED FOR DAILY LIVING?: NO

## 2021-03-17 ASSESSMENT — PATIENT HEALTH QUESTIONNAIRE - PHQ9
1. LITTLE INTEREST OR PLEASURE IN DOING THINGS: 0
SUM OF ALL RESPONSES TO PHQ QUESTIONS 1-9: 0
2. FEELING DOWN, DEPRESSED OR HOPELESS: 0
SUM OF ALL RESPONSES TO PHQ QUESTIONS 1-9: 0
SUM OF ALL RESPONSES TO PHQ QUESTIONS 1-9: 0

## 2021-03-17 NOTE — PROGRESS NOTES
Lester Navas [address] COMPLAINT  Chief Complaint   Patient presents with    Back Pain     lower back pain for over a week, ibuprofen, and heating pad with no help       HISTORY OF PRESENT  ILLNESS  Ilsa Douglass is a 77 y.o.  male   x1 week, slow onset after prolonged laying. Ibu and heat tried. Never before. Had hip pain which resolved with ibuprofen, therefore did not have xray. Known osteoarthritis. Walks the dog 3 miles every day. Pain over lumbar spine at levels L3-5, worse with forward flexion. Denies bowel or bladder changes or incontinence. Denies extremity paresthesias or weakness. ROS   Remaining 14 ROS were reviewed and are unremarkable for other constitutional, EENT, cardiac, pulmonary, GI, , neurologic, musculoskeletal, or integumentary complaints. PAST MEDICAL/SURGICAL, SOCIAL, &  FAMILY HISTORY:  Reviewed and updated accordingly. ALLERGIES :   Tramadol hcl er    MEDICATIONS:  Current Outpatient Medications on File Prior to Visit   Medication Sig Dispense Refill    atorvastatin (LIPITOR) 10 MG tablet Take 1 tablet by mouth daily 30 tablet 5     No current facility-administered medications on file prior to visit. PHYSICAL EXAM     Vitals:    03/17/21 1103   BP: 138/76   Site: Right Upper Arm   Position: Sitting   Cuff Size: Medium Adult   Pulse: 79   Resp: 16   Temp: 98.2 °F (36.8 °C)   TempSrc: Temporal   SpO2: 98%   Weight: 190 lb 6.4 oz (86.4 kg)   Height: 5' 10\" (1.778 m)     APPEARANCE:  Sitting comfortably without obvious distress. HEART: Reg rate and rhythm. No murmurs, rubs, or gallops. LUNGS: Clear to auscultation. No wheezes, rales, or rhonchi. CERVICAL SPINE: No skin or bony deformities. No paraspinal or spinous process tenderness. Moves neck in all planes without eliciting pain. UPPER EXTREMITIES: No skin or bony deformities. Uniform joint movements and strength. Neurovascular equal and intact.    BACK: No skin or bony deformity. No spine tenderness or step-offs. No paraspinal muscle tenderness. Waist forward flexion with fingertips to thighs and elicits pain. Extension, rotation, and lateral bending performed fully. LOWER EXTREMITIES: No skin or bony deformities. Negative straight leg raising. Negative [place]'s test for SI joint. Full range of motion of all joints and equal strength bilaterally. Neurovascular equal and intact. ADDITIONAL STUDIES     Pertinent laboratory results and/or imaging reviewed. No orders of the defined types were placed in this encounter. IMPRESSION/ PLAN  1. Acute midline low back pain without sciatica        -ice and biofreeze to back. If unimproved with meds and rest, RTC and will consider physical therapy. Orders Placed This Encounter   Medications    tiZANidine (ZANAFLEX) 2 MG capsule     Sig: Take 1 capsule by mouth 3 times daily as needed for Muscle spasms Can take up to 2 tabs( 4mg ) three times a day.      Dispense:  30 capsule     Refill:  0    diclofenac (CATAFLAM) 50 MG tablet     Sig: Take 1 tablet by mouth 3 times daily     Dispense:  21 tablet     Refill:  0

## 2021-03-19 DIAGNOSIS — E78.5 HYPERLIPIDEMIA LDL GOAL <130: ICD-10-CM

## 2021-03-19 RX ORDER — ATORVASTATIN CALCIUM 10 MG/1
10 TABLET, FILM COATED ORAL DAILY
Qty: 30 TABLET | Refills: 5 | Status: SHIPPED | OUTPATIENT
Start: 2021-03-19 | End: 2021-07-08 | Stop reason: SDUPTHER

## 2021-03-19 NOTE — TELEPHONE ENCOUNTER
Vivek Sawyer [phone] (home)    is requesting refill(s) of medication Atorvastatin   to preferred pharmacy [address], [address] [facility]    Last OV 07-07-20 (pertaining to medication)   Last refill 11-25-20 (per medication requested)  Next office visit scheduled or attempted Yes  Date 07-08-21  If No, reason made

## 2021-04-14 ENCOUNTER — HOSPITAL ENCOUNTER (OUTPATIENT)
Dept: GENERAL RADIOLOGY | Age: 76
Discharge: HOME | End: 2021-04-14
Payer: MEDICARE

## 2021-04-14 ENCOUNTER — OFFICE VISIT (OUTPATIENT)
Dept: FAMILY MEDICINE CLINIC | Age: 76
End: 2021-04-14
Payer: MEDICARE

## 2021-04-14 VITALS
TEMPERATURE: 97.5 F | WEIGHT: 190.6 LBS | HEIGHT: 70 IN | OXYGEN SATURATION: 97 % | HEART RATE: 87 BPM | SYSTOLIC BLOOD PRESSURE: 138 MMHG | DIASTOLIC BLOOD PRESSURE: 88 MMHG | RESPIRATION RATE: 16 BRPM | BODY MASS INDEX: 27.29 KG/M2

## 2021-04-14 DIAGNOSIS — M54.50 LUMBAR BACK PAIN: ICD-10-CM

## 2021-04-14 DIAGNOSIS — M25.551 ACUTE HIP PAIN, BILATERAL: ICD-10-CM

## 2021-04-14 DIAGNOSIS — M25.551 ACUTE HIP PAIN, BILATERAL: Primary | ICD-10-CM

## 2021-04-14 DIAGNOSIS — M25.50 ARTHRALGIA, UNSPECIFIED JOINT: ICD-10-CM

## 2021-04-14 DIAGNOSIS — M25.552 ACUTE HIP PAIN, BILATERAL: ICD-10-CM

## 2021-04-14 DIAGNOSIS — M54.50 ACUTE BILATERAL LOW BACK PAIN WITHOUT SCIATICA: ICD-10-CM

## 2021-04-14 DIAGNOSIS — M25.552 ACUTE HIP PAIN, BILATERAL: Primary | ICD-10-CM

## 2021-04-14 PROCEDURE — G8427 DOCREV CUR MEDS BY ELIG CLIN: HCPCS | Performed by: PHYSICIAN ASSISTANT

## 2021-04-14 PROCEDURE — 72100 X-RAY EXAM L-S SPINE 2/3 VWS: CPT

## 2021-04-14 PROCEDURE — 1036F TOBACCO NON-USER: CPT | Performed by: PHYSICIAN ASSISTANT

## 2021-04-14 PROCEDURE — G8417 CALC BMI ABV UP PARAM F/U: HCPCS | Performed by: PHYSICIAN ASSISTANT

## 2021-04-14 PROCEDURE — 3017F COLORECTAL CA SCREEN DOC REV: CPT | Performed by: PHYSICIAN ASSISTANT

## 2021-04-14 PROCEDURE — 99213 OFFICE O/P EST LOW 20 MIN: CPT | Performed by: PHYSICIAN ASSISTANT

## 2021-04-14 PROCEDURE — 4040F PNEUMOC VAC/ADMIN/RCVD: CPT | Performed by: PHYSICIAN ASSISTANT

## 2021-04-14 PROCEDURE — 73521 X-RAY EXAM HIPS BI 2 VIEWS: CPT

## 2021-04-14 PROCEDURE — 1123F ACP DISCUSS/DSCN MKR DOCD: CPT | Performed by: PHYSICIAN ASSISTANT

## 2021-04-14 RX ORDER — PREDNISONE 10 MG/1
TABLET ORAL
Qty: 21 TABLET | Refills: 0 | Status: SHIPPED | OUTPATIENT
Start: 2021-04-14 | End: 2021-07-08 | Stop reason: ALTCHOICE

## 2021-04-14 NOTE — PROGRESS NOTES
Lester Navas [address] COMPLAINT  Chief Complaint   Patient presents with    Back Pain     Pt has back pain and right hip     Hip Pain       HISTORY OF PRESENT  ILLNESS  Gino Herrera is a 77 y.o.  male   Returns with worsened low back and hip pains, Right GT left side. Diclofenac and Zanaflex helped but now  pain returned. Golfing last week was limited due to pain and decreased ROM. Cannot walk his dogs daily. NO xrays done. Denies bowel or bladder changes or incontinence. Denies extremity paresthesias or weakness. Arthritis of hands also. Mom had RA, he tested negative 2016. ROS   Remaining 14 ROS were reviewed and are unremarkable for other constitutional, EENT, cardiac, pulmonary, GI, , neurologic, musculoskeletal, or integumentary complaints. PAST MEDICAL/SURGICAL, SOCIAL, &  FAMILY HISTORY:  Reviewed and updated accordingly. ALLERGIES :   Tramadol hcl er    MEDICATIONS:  Current Outpatient Medications on File Prior to Visit   Medication Sig Dispense Refill    atorvastatin (LIPITOR) 10 MG tablet Take 1 tablet by mouth daily 30 tablet 5    diclofenac (CATAFLAM) 50 MG tablet Take 1 tablet by mouth 3 times daily (Patient not taking: Reported on 4/14/2021) 21 tablet 0     No current facility-administered medications on file prior to visit. PHYSICAL EXAM     Vitals:    04/14/21 1036   BP: 138/88   Site: Right Upper Arm   Position: Sitting   Pulse: 87   Resp: 16   Temp: 97.5 °F (36.4 °C)   SpO2: 97%   Weight: 190 lb 9.6 oz (86.5 kg)   Height: 5' 10\" (1.778 m)     APPEARANCE:  Limping with slow antalgic gait. Wincing in pain. HEART: Reg rate and rhythm. No murmurs, rubs, or gallops. LUNGS: Clear to auscultation. No wheezes, rales, or rhonchi. CERVICAL SPINE: No skin or bony deformities. No paraspinal or spinous process tenderness. Moves neck in all planes without eliciting pain. UPPER EXTREMITIES: No skin or bony deformities.   Uniform joint

## 2021-04-14 NOTE — PATIENT INSTRUCTIONS
Prednisone(steroid) 10-Day Taper Record    Day   Date      #of 10mgTabs  mg Dose  1.  _______   4                    40mg  2.  _______   4                     3.  _______   3                    30mg  4.  _______   3                  5.  _______   2                    20mg  6.  _______   2           7.  _______   1                    10mg  8.  _______   1      9.  _______   1/2                  5mg     10. _______  1/2

## 2021-04-22 ENCOUNTER — OFFICE VISIT (OUTPATIENT)
Dept: ORTHOPEDIC SURGERY | Age: 76
End: 2021-04-22
Payer: MEDICARE

## 2021-04-22 VITALS — WEIGHT: 190 LBS | BODY MASS INDEX: 27.2 KG/M2 | HEIGHT: 70 IN

## 2021-04-22 DIAGNOSIS — M16.11 PRIMARY OSTEOARTHRITIS OF RIGHT HIP: Primary | ICD-10-CM

## 2021-04-22 DIAGNOSIS — M25.852 LEFT HIP IMPINGEMENT SYNDROME: ICD-10-CM

## 2021-04-22 DIAGNOSIS — M16.12 PRIMARY OSTEOARTHRITIS OF LEFT HIP: ICD-10-CM

## 2021-04-22 PROCEDURE — 99204 OFFICE O/P NEW MOD 45 MIN: CPT | Performed by: ORTHOPAEDIC SURGERY

## 2021-04-22 PROCEDURE — 4040F PNEUMOC VAC/ADMIN/RCVD: CPT | Performed by: ORTHOPAEDIC SURGERY

## 2021-04-22 PROCEDURE — 3017F COLORECTAL CA SCREEN DOC REV: CPT | Performed by: ORTHOPAEDIC SURGERY

## 2021-04-22 PROCEDURE — G8417 CALC BMI ABV UP PARAM F/U: HCPCS | Performed by: ORTHOPAEDIC SURGERY

## 2021-04-22 PROCEDURE — 1036F TOBACCO NON-USER: CPT | Performed by: ORTHOPAEDIC SURGERY

## 2021-04-22 PROCEDURE — G8427 DOCREV CUR MEDS BY ELIG CLIN: HCPCS | Performed by: ORTHOPAEDIC SURGERY

## 2021-04-22 PROCEDURE — 1123F ACP DISCUSS/DSCN MKR DOCD: CPT | Performed by: ORTHOPAEDIC SURGERY

## 2021-04-22 PROCEDURE — 20611 DRAIN/INJ JOINT/BURSA W/US: CPT | Performed by: ORTHOPAEDIC SURGERY

## 2021-04-22 RX ORDER — TRIAMCINOLONE ACETONIDE 40 MG/ML
80 INJECTION, SUSPENSION INTRA-ARTICULAR; INTRAMUSCULAR ONCE
Status: COMPLETED | OUTPATIENT
Start: 2021-04-22 | End: 2021-04-22

## 2021-04-22 RX ORDER — BUPIVACAINE HYDROCHLORIDE 2.5 MG/ML
2.5 INJECTION, SOLUTION INFILTRATION; PERINEURAL ONCE
Status: COMPLETED | OUTPATIENT
Start: 2021-04-22 | End: 2021-04-22

## 2021-04-22 RX ORDER — LIDOCAINE HYDROCHLORIDE 10 MG/ML
10 INJECTION, SOLUTION INFILTRATION; PERINEURAL ONCE
Status: COMPLETED | OUTPATIENT
Start: 2021-04-22 | End: 2021-04-22

## 2021-04-22 RX ADMIN — LIDOCAINE HYDROCHLORIDE 10 MG: 10 INJECTION, SOLUTION INFILTRATION; PERINEURAL at 10:33

## 2021-04-22 RX ADMIN — BUPIVACAINE HYDROCHLORIDE 2.5 MG: 2.5 INJECTION, SOLUTION INFILTRATION; PERINEURAL at 10:32

## 2021-04-22 RX ADMIN — TRIAMCINOLONE ACETONIDE 80 MG: 40 INJECTION, SUSPENSION INTRA-ARTICULAR; INTRAMUSCULAR at 10:32

## 2021-04-22 NOTE — PROGRESS NOTES
Dr Marga Jimenez      Date /Time 4/22/2021       10:34 AM EDT  Name Tyler Valero             1945   Location  [facility]  MRN [id number]                Chief Complaint   Patient presents with    Hip Pain     NP Bilateral Hips states inc pain x 2 weeks h/o LBP lateral posterior pain xrays in epic pacs states no numbness or tiingling BLE        History of Present Illness    Tyler Valero is a 77 y.o. male who presents with  bilateral hip pain. Sent in consultation by Jamari Villalobos DO,. Occupation: Retired  Occupational activities: light lifting. Athletic/exercise activity: no sports. Injury Mechanism:  none. Worker's Comp. & legal issues:   none. Previous Treatments: Ice, Heat and NSAIDs    Patient presents the office today for a new problem. Patient is here with chief complaint of right greater than left hip pain. Symptoms have been present for approximately a month. He originally presented to his primary care provider with low back pain. He was placed on Zanaflex and his low back pain resolved. He then was left with more hip pain. Pain is concentrated over his right greater than left buttocks and lateral hip region. He does have increased pain with activities and improvement with rest.  He was placed on prednisone with very little improvement. Past History  Past Medical History:   Diagnosis Date    Elevated blood pressure reading without diagnosis of hypertension      Past Surgical History:   Procedure Laterality Date    FINGER AMPUTATION Right     middle finger    KNEE CARTILAGE SURGERY Left     SHOULDER SURGERY Right     bone spur removed.      THUMB AMPUTATION Left     tip of thumb     Family History   Problem Relation Age of Onset    Heart Disease Mother     High Cholesterol Mother     Heart Disease Father     Heart Disease Maternal Grandmother     High Blood Pressure Brother      Social History     Tobacco Use    Smoking status: Never Smoker    Smokeless tobacco: Never Used   Substance Use Topics    Alcohol use: Yes     Alcohol/week: 0.0 - 2.0 standard drinks      Current Outpatient Medications on File Prior to Visit   Medication Sig Dispense Refill    predniSONE (DELTASONE) 10 MG tablet 10 DAY TAPER:40 mg x 2 days then, 30 mg x 2d, then, 20 mg x 2d then, 10 mg x 2d then, 1/2 tab x 2days. 21 tablet 0    atorvastatin (LIPITOR) 10 MG tablet Take 1 tablet by mouth daily 30 tablet 5    diclofenac (CATAFLAM) 50 MG tablet Take 1 tablet by mouth 3 times daily (Patient not taking: Reported on 4/14/2021) 21 tablet 0     No current facility-administered medications on file prior to visit. ASCVD 10-YEAR RISK SCORE  The 10-year ASCVD risk score (Amelia Menon, et al., 2013) is: 22.3%    Values used to calculate the score:      Age: 77 years      Sex: Male      Is Non- : No      Diabetic: No      Tobacco smoker: No      Systolic Blood Pressure: 436 mmHg      Is BP treated: No      HDL Cholesterol: 61 mg/dL      Total Cholesterol: 152 mg/dL   . Review of Systems  10-point ROS is negative other than HPI. Physical Exam  Based off 1997 Exam Criteria  Ht 5' 10\" (1.778 m)   Wt 190 lb (86.2 kg)   BMI 27.26 kg/m²      Constitutional:       General: He is not in acute distress. Appearance: Normal appearance. Cardiovascular:      Rate and Rhythm: Normal rate and regular rhythm. Pulses: Normal pulses. Pulmonary:      Effort: Pulmonary effort is normal. No respiratory distress. Neurological:      Mental Status: He is alert and oriented to person, place, and time. Mental status is at baseline.      Musculoskeletal:  Gait:  antalgic    Spine / Hip Exam:      RIGHT  LEFT    Lumbar Spine Exam  [x] All Neg    [x] All Neg     Straight leg raise  []  []Not tested   []  []Not tested    Clonus  []  []Not tested   []  []Not tested    Pain with motion  []  []Not tested   []  []Not tested    Radiculopathy  []  []Not tested   []  []Not tested Paraspinal muscle tenderness  [] Paraspinal  []Midline   [] Paraspinal  []Midline   Sensation RIGHT  LEFT    L3  [x] Normal []Decreased    [x] Normal []Decreased   L4  [x] Normal  []Decreased   [x] Normal []Decreased   L5  [x] Normal []Decreased   [x] Normal []Decreased   S1  [x] Normal  []Decreased   [x] Normal []Decreased   Pelvis       Scoliosis  [x] Nml  [] Present     Leg-length discrepency  [x] Equal  [] Right longer   [] Left longer   Range of Motion Active Passive Active Passive   Hip Flexion 110  110    Abduction 40  40    External Rotation @ 90 flex 45  45    Internal Rotation @ 90 flex 20  20           Hip Impingement / Dysplasia  [] All Neg  [] Not tested   [] All Neg  [] Not tested    Hip impingement test  [x]  []Not tested   [x]  []Not tested    C-sign  [x]  []Not tested   [x]  []Not tested    Anterior instability apprehension  []  []Not tested   []  []Not tested    Posterior instability apprehension  []  []Not tested   []  []Not tested    Uncontained Internal rotation  []  []Not tested  []  []Not tested          Abductors  [x] All Neg  [] Not tested   [x] All Neg  [] Not tested    Medius strength  []  []Not tested   []  []Not tested    Minimum strength  []  []Not tested   []  []Not tested    IT band tendonitis  []  []Not tested   []  []Not tested    Trochanteric tenderness  []  []Not tested  []  []Not tested   Sciatic neuropathic pain  []  []Not tested   []  []Not tested           Post-arthroplasty  [] All Neg  [] Not tested   [] All Neg  [] Not tested    Rectus tendonitis  []  []Not tested   []  []Not tested    Iliopsoas tendonitis       Start-up pain  []  []Not tested   []  []Not tested      Stinchfield test on right. He points to the pain in the posterior aspect of the hip. Imaging    Bilateral Hip: [facility]  Radiographs: X-rays were reviewed of both the right and left hips which were taken previously on April 14, 2021.   They do demonstrate a right greater than left moderate (timeout). The hip was prepped with alcohol.  Deep ultrasound was used to visualize the femoral head, neck, and acetabular rim. 22-gauge spinal needle was used. Ultrasound-guided needle localization to the hip joint was performed. A formulation of 2cc of 40mg/ml Kenalog, 1cc of 1% lidocaine, 1cc of 0.25% sensoricaine was injected into the hip. Appropriate insufflation deep to the hip capsule was visualized. The site was cleaned and dressed with a band aid.  He tolerated this well and there were no complications. 40% of her pain was relieved after injection. We discussed surgical options as well, should conservative measures fail. Electronically signed by Javier Orellana MD on 4/22/2021 at 10:34 AM  This dictation was generated by voice recognition computer software. Although all attempts are made to edit the dictation for accuracy, there may be errors in the transcription that are not intended.

## 2021-07-08 ENCOUNTER — OFFICE VISIT (OUTPATIENT)
Dept: FAMILY MEDICINE CLINIC | Age: 76
End: 2021-07-08
Payer: MEDICARE

## 2021-07-08 VITALS
DIASTOLIC BLOOD PRESSURE: 80 MMHG | HEART RATE: 72 BPM | SYSTOLIC BLOOD PRESSURE: 138 MMHG | WEIGHT: 186.2 LBS | OXYGEN SATURATION: 98 % | BODY MASS INDEX: 26.66 KG/M2 | HEIGHT: 70 IN

## 2021-07-08 DIAGNOSIS — E78.5 DYSLIPIDEMIA: ICD-10-CM

## 2021-07-08 DIAGNOSIS — Z00.00 ROUTINE GENERAL MEDICAL EXAMINATION AT A HEALTH CARE FACILITY: Primary | ICD-10-CM

## 2021-07-08 DIAGNOSIS — Z12.5 SCREENING PSA (PROSTATE SPECIFIC ANTIGEN): ICD-10-CM

## 2021-07-08 PROBLEM — M25.551 RIGHT HIP PAIN: Status: ACTIVE | Noted: 2021-07-08

## 2021-07-08 PROBLEM — K64.4 EXTERNAL HEMORRHOID: Status: ACTIVE | Noted: 2021-07-08

## 2021-07-08 PROBLEM — J13 PNEUMONIA OF LEFT LOWER LOBE DUE TO STREPTOCOCCUS PNEUMONIAE (HCC): Status: RESOLVED | Noted: 2018-08-08 | Resolved: 2021-07-08

## 2021-07-08 LAB
A/G RATIO: 1.8 (ref 1.1–2.2)
ALBUMIN SERPL-MCNC: 4.6 G/DL (ref 3.4–5)
ALP BLD-CCNC: 76 U/L (ref 40–129)
ALT SERPL-CCNC: 19 U/L (ref 10–40)
ANION GAP SERPL CALCULATED.3IONS-SCNC: 11 MMOL/L (ref 3–16)
AST SERPL-CCNC: 19 U/L (ref 15–37)
BILIRUB SERPL-MCNC: 0.7 MG/DL (ref 0–1)
BUN BLDV-MCNC: 21 MG/DL (ref 7–20)
CALCIUM SERPL-MCNC: 9.3 MG/DL (ref 8.3–10.6)
CHLORIDE BLD-SCNC: 102 MMOL/L (ref 99–110)
CHOLESTEROL, TOTAL: 161 MG/DL (ref 0–199)
CO2: 27 MMOL/L (ref 21–32)
CREAT SERPL-MCNC: 0.8 MG/DL (ref 0.8–1.3)
GFR AFRICAN AMERICAN: >60
GFR NON-AFRICAN AMERICAN: >60
GLOBULIN: 2.6 G/DL
GLUCOSE BLD-MCNC: 92 MG/DL (ref 70–99)
HDLC SERPL-MCNC: 71 MG/DL (ref 40–60)
LDL CHOLESTEROL CALCULATED: 73 MG/DL
POTASSIUM SERPL-SCNC: 4.3 MMOL/L (ref 3.5–5.1)
PROSTATE SPECIFIC ANTIGEN: 1.49 NG/ML (ref 0–4)
SODIUM BLD-SCNC: 140 MMOL/L (ref 136–145)
TOTAL PROTEIN: 7.2 G/DL (ref 6.4–8.2)
TRIGL SERPL-MCNC: 85 MG/DL (ref 0–150)
VLDLC SERPL CALC-MCNC: 17 MG/DL

## 2021-07-08 PROCEDURE — G0439 PPPS, SUBSEQ VISIT: HCPCS | Performed by: FAMILY MEDICINE

## 2021-07-08 PROCEDURE — 36415 COLL VENOUS BLD VENIPUNCTURE: CPT | Performed by: FAMILY MEDICINE

## 2021-07-08 PROCEDURE — 1123F ACP DISCUSS/DSCN MKR DOCD: CPT | Performed by: FAMILY MEDICINE

## 2021-07-08 PROCEDURE — 4040F PNEUMOC VAC/ADMIN/RCVD: CPT | Performed by: FAMILY MEDICINE

## 2021-07-08 PROCEDURE — 3017F COLORECTAL CA SCREEN DOC REV: CPT | Performed by: FAMILY MEDICINE

## 2021-07-08 RX ORDER — HYDROCORTISONE 25 MG/G
CREAM TOPICAL
Qty: 1 TUBE | Refills: 0 | Status: SHIPPED | OUTPATIENT
Start: 2021-07-08 | End: 2021-08-09 | Stop reason: SDUPTHER

## 2021-07-08 RX ORDER — IBUPROFEN 200 MG
200 TABLET ORAL EVERY 6 HOURS PRN
COMMUNITY
End: 2022-07-14

## 2021-07-08 RX ORDER — ATORVASTATIN CALCIUM 10 MG/1
10 TABLET, FILM COATED ORAL DAILY
Qty: 90 TABLET | Refills: 3 | Status: SHIPPED | OUTPATIENT
Start: 2021-07-08 | End: 2022-06-27 | Stop reason: SDUPTHER

## 2021-07-08 ASSESSMENT — LIFESTYLE VARIABLES
HAVE YOU OR SOMEONE ELSE BEEN INJURED AS A RESULT OF YOUR DRINKING: 0
HOW OFTEN DO YOU HAVE SIX OR MORE DRINKS ON ONE OCCASION: 0
HAS A RELATIVE, FRIEND, DOCTOR, OR ANOTHER HEALTH PROFESSIONAL EXPRESSED CONCERN ABOUT YOUR DRINKING OR SUGGESTED YOU CUT DOWN: 0
HOW MANY STANDARD DRINKS CONTAINING ALCOHOL DO YOU HAVE ON A TYPICAL DAY: 0
HOW OFTEN DURING THE LAST YEAR HAVE YOU FAILED TO DO WHAT WAS NORMALLY EXPECTED FROM YOU BECAUSE OF DRINKING: 0
HOW OFTEN DURING THE LAST YEAR HAVE YOU BEEN UNABLE TO REMEMBER WHAT HAPPENED THE NIGHT BEFORE BECAUSE YOU HAD BEEN DRINKING: 0
AUDIT-C TOTAL SCORE: 2
HOW OFTEN DO YOU HAVE A DRINK CONTAINING ALCOHOL: 2
HOW OFTEN DURING THE LAST YEAR HAVE YOU FOUND THAT YOU WERE NOT ABLE TO STOP DRINKING ONCE YOU HAD STARTED: 0
HOW OFTEN DURING THE LAST YEAR HAVE YOU NEEDED AN ALCOHOLIC DRINK FIRST THING IN THE MORNING TO GET YOURSELF GOING AFTER A NIGHT OF HEAVY DRINKING: 0
HOW OFTEN DURING THE LAST YEAR HAVE YOU HAD A FEELING OF GUILT OR REMORSE AFTER DRINKING: 0
AUDIT TOTAL SCORE: 2

## 2021-07-08 ASSESSMENT — PATIENT HEALTH QUESTIONNAIRE - PHQ9
SUM OF ALL RESPONSES TO PHQ QUESTIONS 1-9: 0
1. LITTLE INTEREST OR PLEASURE IN DOING THINGS: 0
SUM OF ALL RESPONSES TO PHQ9 QUESTIONS 1 & 2: 0
SUM OF ALL RESPONSES TO PHQ QUESTIONS 1-9: 0
SUM OF ALL RESPONSES TO PHQ QUESTIONS 1-9: 0
2. FEELING DOWN, DEPRESSED OR HOPELESS: 0

## 2021-07-08 NOTE — PROGRESS NOTES
Medicare Annual Wellness Visit  Name: Ricky Cooley Date: 2021   MRN: <[id number]> Sex: Male   Age: 77 y.o. Ethnicity: Non-/Non    : 1945 Race: Tello Garcia is here for Medicare AWV (Patient is here for a Medicare Annual Wellness Visit. He is fasting for blood work.)    Screenings for behavioral, psychosocial and functional/safety risks, and cognitive dysfunction are all negative except as indicated below. These results, as well as other patient data from the [address] form, are documented in Flowsheets linked to this Encounter. Seeing ortho for R hip pain/ DJD  Also hemorrhoids flaring up  Allergies   Allergen Reactions    Tramadol Hcl Er Other (See Comments)     Causes more pain that he had before. Prior to Visit Medications    Medication Sig Taking? Authorizing Provider   ibuprofen (ADVIL;MOTRIN) 200 MG tablet Take 200 mg by mouth every 6 hours as needed for Pain Yes Historical Provider, MD   atorvastatin (LIPITOR) 10 MG tablet Take 1 tablet by mouth daily Yes Nancy Paz DO       Past Medical History:   Diagnosis Date    Elevated blood pressure reading without diagnosis of hypertension        Past Surgical History:   Procedure Laterality Date    FINGER AMPUTATION Right     middle finger    KNEE CARTILAGE SURGERY Left     SHOULDER SURGERY Right     bone spur removed.      THUMB AMPUTATION Left     tip of thumb       Family History   Problem Relation Age of Onset    Heart Disease Mother     High Cholesterol Mother     Heart Disease Father     Heart Disease Maternal Grandmother     High Blood Pressure Brother        CareTeam (Including outside providers/suppliers regularly involved in providing care):   Patient Care Team:  Akua Lu DO as PCP - General (Family Medicine)  Akua Lu DO as PCP - [facility] Empaneled Provider  Tamela Betts MD (Gastroenterology)  Santi Mcguire MD (Internal Medicine)  Donnell Beckford KASSI Sharpe - NP (Nurse Practitioner)    Wt Readings from Last 3 Encounters:   07/08/21 186 lb 3.2 oz (84.5 kg)   04/22/21 190 lb (86.2 kg)   04/14/21 190 lb 9.6 oz (86.5 kg)     Vitals:    07/08/21 0802   BP: 138/80   Pulse: 72   SpO2: 98%   Weight: 186 lb 3.2 oz (84.5 kg)   Height: 5' 10\" (1.778 m)     Body mass index is 26.72 kg/m². Based upon direct observation of the patient, evaluation of cognition reveals recent and remote memory intact. General Appearance: alert and oriented to person, place and time, well developed and well- nourished, in no acute distress  Skin: warm and dry, no rash or erythema  Head: normocephalic and atraumatic  Eyes: pupils equal, round, and reactive to light, extraocular eye movements intact, conjunctivae normal  ENT: tympanic membrane, external ear and ear canal normal bilaterally, nose without deformity, nasal mucosa and turbinates normal without polyps  Neck: supple and non-tender without mass, no thyromegaly or thyroid nodules, no cervical lymphadenopathy  Pulmonary/Chest: clear to auscultation bilaterally- no wheezes, rales or rhonchi, normal air movement, no respiratory distress  Cardiovascular: normal rate, regular rhythm, normal S1 and S2, no murmurs, rubs, clicks, or gallops, distal pulses intact, no carotid bruits  Abdomen: soft, non-tender, non-distended, normal bowel sounds, no masses or organomegaly  Extremities: no cyanosis, clubbing or edema  Musculoskeletal: normal range of motion, no joint swelling, deformity or tenderness  Neurologic: reflexes normal and symmetric, no cranial nerve deficit, gait, coordination and speech normal    Patient's complete Health Risk Assessment and screening values have been reviewed and are found in Flowsheets. The following problems were reviewed today and where indicated follow up appointments were made and/or referrals ordered.     Positive Risk Factor Screenings with Interventions:          General Health and ACP:  General  In general, how would you say your health is?: Very Good  In the past 7 days, have you experienced any of the following? New or Increased Pain, New or Increased Fatigue, Loneliness, Social Isolation, Stress or Anger?: None of These  Do you get the social and emotional support that you need?: Yes  Do you have a Living Will?: (!) No  Advance Directives     Power of  Living Will ACP-Advance Directive ACP-Power of     Not on File Not on File Not on File Not on File      General Health Risk Interventions:  · No Living Will: Patient referred to [address] [place] [address] Specialist        Personalized Preventive Plan   Current Health Maintenance Status  Immunization History   Administered Date(s) Administered    COVID-19, Donohue Peter, PF, 30mcg/0.3mL 03/22/2021, 04/12/2021    DTaP vaccine 06/08/2008    Influenza, High Dose (Fluzone 65 yrs and older) 11/01/2017, 01/04/2019    Pneumococcal Conjugate 13-valent (Mwyypkg60) 06/15/2016    Pneumococcal Conjugate 7-valent (Prevnar7) 06/08/2008    Pneumococcal Polysaccharide ([id number]) 11/01/2017    Tdap (Boostrix, Adacel) 11/04/2019        Health Maintenance   Topic Date Due    Annual Wellness Visit (AWV)  Never done    Lipid screen  07/07/2021    Shingles Vaccine (1 of 2) 07/08/2022 (Originally 9/23/1996)    Flu vaccine (1) 09/01/2021    Colon cancer screen colonoscopy  11/15/2022    DTaP/Tdap/Td vaccine (3 - Td or Tdap) 11/04/2029    Pneumococcal 65+ years Vaccine  Completed    COVID-19 Vaccine  Completed    Hepatitis A vaccine  Aged Out    Hepatitis B vaccine  Aged Out    Hib vaccine  Aged Out    Meningococcal (ACWY) vaccine  Aged Out    Hepatitis C screen  Discontinued     Recommendations for [employer] Due: see orders and patient instructions/AVS.  . Recommended screening schedule for the next 5-10 years is provided to the patient in written form: see Patient Instructions/AVS.    Mukul Aponte was seen today for medicare awv.     Diagnoses and all orders for this visit:    Routine general medical examination at a health care facility    Hyperlipidemia LDL goal <130           Orders Placed This Encounter   Procedures    LIPID PANEL     Order Specific Question:   Is Patient Fasting?/# of Hours     Answer:   15    COMPREHENSIVE METABOLIC PANEL    PSA screening     Orders Placed This Encounter   Medications    atorvastatin (LIPITOR) 10 MG tablet     Sig: Take 1 tablet by mouth daily     Dispense:  90 tablet     Refill:  3    hydrocortisone (ANUSOL-HC) 2.5 % CREA rectal cream     Sig: Apply to hemorrhoids bid     Dispense:  1 Tube     Refill:  0

## 2021-08-05 ENCOUNTER — OFFICE VISIT (OUTPATIENT)
Dept: ORTHOPEDIC SURGERY | Age: 76
End: 2021-08-05
Payer: MEDICARE

## 2021-08-05 VITALS — BODY MASS INDEX: 26.63 KG/M2 | WEIGHT: 186 LBS | HEIGHT: 70 IN

## 2021-08-05 DIAGNOSIS — M25.852 LEFT HIP IMPINGEMENT SYNDROME: Primary | ICD-10-CM

## 2021-08-05 DIAGNOSIS — M16.12 PRIMARY OSTEOARTHRITIS OF LEFT HIP: ICD-10-CM

## 2021-08-05 PROCEDURE — 3017F COLORECTAL CA SCREEN DOC REV: CPT | Performed by: ORTHOPAEDIC SURGERY

## 2021-08-05 PROCEDURE — 4040F PNEUMOC VAC/ADMIN/RCVD: CPT | Performed by: ORTHOPAEDIC SURGERY

## 2021-08-05 PROCEDURE — G8417 CALC BMI ABV UP PARAM F/U: HCPCS | Performed by: ORTHOPAEDIC SURGERY

## 2021-08-05 PROCEDURE — 1123F ACP DISCUSS/DSCN MKR DOCD: CPT | Performed by: ORTHOPAEDIC SURGERY

## 2021-08-05 PROCEDURE — 99213 OFFICE O/P EST LOW 20 MIN: CPT | Performed by: ORTHOPAEDIC SURGERY

## 2021-08-05 PROCEDURE — 1036F TOBACCO NON-USER: CPT | Performed by: ORTHOPAEDIC SURGERY

## 2021-08-05 PROCEDURE — G8427 DOCREV CUR MEDS BY ELIG CLIN: HCPCS | Performed by: ORTHOPAEDIC SURGERY

## 2021-08-05 PROCEDURE — 20611 DRAIN/INJ JOINT/BURSA W/US: CPT | Performed by: ORTHOPAEDIC SURGERY

## 2021-08-05 RX ORDER — LIDOCAINE HYDROCHLORIDE 10 MG/ML
20 INJECTION, SOLUTION INFILTRATION; PERINEURAL ONCE
Status: COMPLETED | OUTPATIENT
Start: 2021-08-05 | End: 2021-08-05

## 2021-08-05 RX ORDER — BUPIVACAINE HYDROCHLORIDE 2.5 MG/ML
30 INJECTION, SOLUTION INFILTRATION; PERINEURAL ONCE
Status: COMPLETED | OUTPATIENT
Start: 2021-08-05 | End: 2021-08-05

## 2021-08-05 RX ADMIN — LIDOCAINE HYDROCHLORIDE 20 ML: 10 INJECTION, SOLUTION INFILTRATION; PERINEURAL at 08:39

## 2021-08-05 RX ADMIN — BUPIVACAINE HYDROCHLORIDE 75 MG: 2.5 INJECTION, SOLUTION INFILTRATION; PERINEURAL at 08:39

## 2021-08-05 NOTE — PROGRESS NOTES
Dr Siomara Hay      Date /Time 8/5/2021       10:34 AM EDT  Name Yesy Hale             1945   Location  [facility]  MRN [id number]                Chief Complaint   Patient presents with    Hip Pain     CK SATHISH HIPS        History of Present Illness    Yesy Hale is a 77 y.o. male who presents with  bilateral hip pain. Sent in consultation by David Courtney DO,. Occupation: Retired  Occupational activities: light lifting. Athletic/exercise activity: no sports. Injury Mechanism:  none. Worker's Comp. & legal issues:   none. Previous Treatments: Ice, Heat and NSAIDs     Current history: At patient's last visit we did perform a right hip intra-articular cortisone injection. The patient did well and continues to do well in his right hip. His left hip is more symptomatic at this point. Pain is concentrated over his groin. Increased pain with activities and improvement with rest.  No recent injury or trauma. Previous history: Patient presents the office today for a new problem. Patient is here with chief complaint of right greater than left hip pain. Symptoms have been present for approximately a month. He originally presented to his primary care provider with low back pain. He was placed on Zanaflex and his low back pain resolved. He then was left with more hip pain. Pain is concentrated over his right greater than left buttocks and lateral hip region. He does have increased pain with activities and improvement with rest.  He was placed on prednisone with very little improvement. Past History  Past Medical History:   Diagnosis Date    Elevated blood pressure reading without diagnosis of hypertension      Past Surgical History:   Procedure Laterality Date    FINGER AMPUTATION Right     middle finger    KNEE CARTILAGE SURGERY Left     SHOULDER SURGERY Right     bone spur removed.      THUMB AMPUTATION Left     tip of thumb     Family History   Problem Relation Age of Onset    Heart Disease Mother     High Cholesterol Mother     Heart Disease Father     Heart Disease Maternal Grandmother     High Blood Pressure Brother      Social History     Tobacco Use    Smoking status: Never Smoker    Smokeless tobacco: Never Used   Substance Use Topics    Alcohol use: Yes     Alcohol/week: 0.0 - 2.0 standard drinks      Current Outpatient Medications on File Prior to Visit   Medication Sig Dispense Refill    ibuprofen (ADVIL;MOTRIN) 200 MG tablet Take 200 mg by mouth every 6 hours as needed for Pain      atorvastatin (LIPITOR) 10 MG tablet Take 1 tablet by mouth daily 90 tablet 3    hydrocortisone (ANUSOL-HC) 2.5 % CREA rectal cream Apply to hemorrhoids bid 1 Tube 0     No current facility-administered medications on file prior to visit. ASCVD 10-YEAR RISK SCORE  The 10-year ASCVD risk score (Nikki Nava, et al., 2013) is: 21.7%    Values used to calculate the score:      Age: 77 years      Sex: Male      Is Non- : No      Diabetic: No      Tobacco smoker: No      Systolic Blood Pressure: 966 mmHg      Is BP treated: No      HDL Cholesterol: 71 mg/dL      Total Cholesterol: 161 mg/dL   . Review of Systems  10-point ROS is negative other than HPI. Physical Exam  Based off 1997 Exam Criteria  Ht 5' 10\" (1.778 m)   Wt 186 lb (84.4 kg)   BMI 26.69 kg/m²      Constitutional:       General: He is not in acute distress. Appearance: Normal appearance. Cardiovascular:      Rate and Rhythm: Normal rate and regular rhythm. Pulses: Normal pulses. Pulmonary:      Effort: Pulmonary effort is normal. No respiratory distress. Neurological:      Mental Status: He is alert and oriented to person, place, and time. Mental status is at baseline.      Musculoskeletal:  Gait:  antalgic    Spine / Hip Exam:      RIGHT  LEFT    Lumbar Spine Exam  [x] All Neg    [x] All Neg     Straight leg raise  []  []Not tested   []  []Not tested    Clonus  []  []Not tested   []  []Not tested    Pain with motion  []  []Not tested   []  []Not tested    Radiculopathy  []  []Not tested   []  []Not tested    Paraspinal muscle tenderness  [] Paraspinal  []Midline   [] Paraspinal  []Midline   Sensation RIGHT  LEFT    L3  [x] Normal []Decreased    [x] Normal []Decreased   L4  [x] Normal  []Decreased   [x] Normal []Decreased   L5  [x] Normal []Decreased   [x] Normal []Decreased   S1  [x] Normal  []Decreased   [x] Normal []Decreased   Pelvis       Scoliosis  [x] Nml  [] Present     Leg-length discrepency  [x] Equal  [] Right longer   [] Left longer   Range of Motion Active Passive Active Passive   Hip Flexion 110  110    Abduction 40  40    External Rotation @ 90 flex 45  45    Internal Rotation @ 90 flex 20  20           Hip Impingement / Dysplasia  [] All Neg  [] Not tested   [] All Neg  [] Not tested    Hip impingement test  [x]  []Not tested   [x]  []Not tested    C-sign  [x]  []Not tested   [x]  []Not tested    Anterior instability apprehension  []  []Not tested   []  []Not tested    Posterior instability apprehension  []  []Not tested   []  []Not tested    Uncontained Internal rotation  []  []Not tested  []  []Not tested          Abductors  [x] All Neg  [] Not tested   [x] All Neg  [] Not tested    Medius strength  []  []Not tested   []  []Not tested    Minimum strength  []  []Not tested   []  []Not tested    IT band tendonitis  []  []Not tested   []  []Not tested    Trochanteric tenderness  []  []Not tested  []  []Not tested   Sciatic neuropathic pain  []  []Not tested   []  []Not tested           Post-arthroplasty  [] All Neg  [] Not tested   [] All Neg  [] Not tested    Rectus tendonitis  []  []Not tested   []  []Not tested    Iliopsoas tendonitis       Start-up pain  []  []Not tested   []  []Not tested      Stinchfield test on right. He points to the pain in the posterior aspect of the hip.     Imaging    Bilateral Hip: [address] [facility]  Previous adiographs: X-rays were reviewed of both the right and left hips which were taken previously on April 14, 2021. They do demonstrate a right greater than left moderate hip osteoarthritis with significant osteophyte formation. There is a large pincer lesion again right greater than left and a cam lesion. There is osseous formation present at the anterior inferior iliac spine on the right hip indicating previous rectus pathology. 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 X-rays were also taken previously of the lumbar spine. They were reviewed today and demonstrate lumbar degenerative disc disease and a grade 1 spondylolisthesis. Assessment and Plan  Alcira Keller was seen today for hip pain. Diagnoses and all orders for this visit:    Left hip impingement syndrome  -     US ARTHR/ASP/INJ MAJOR JNT/BURSA LEFT; Future    Primary osteoarthritis of left hip  -     US ARTHR/ASP/INJ MAJOR JNT/BURSA LEFT; Future    Other orders  -     lidocaine 1 % injection 20 mL  -     triamcinolone acetonide (KENALOG) injection 10 mg  -     bupivacaine (MARCAINE) 0.25 % injection 75 mg        Patient is suffering from a combination of left hip impingement and osteoarthritis. We will perform a left hip intra-articular cortisone injection today with ultrasound guidance. He will monitor his symptoms and to follow-up in 3 months. I discussed with James Ward that his history, symptoms, signs and imaging are most consistent with hip arthritis    We reviewed the natural history of these conditions and treatment options ranging from conservative measures (rest, icing, activity modification, physical therapy, pain meds, cortisone injection) to surgical options. In terms of treatment, I recommended continuing with rest, icing, avoidance of painful activities, NSAIDs or pain meds as tolerated, and physical therapy.      Left Hip Cortisone Injection - Ultrasound-guided CPT: 01305   Consent was obtained after discussion of the risks, benefits, alternatives, including, but not limited to bleeding, pain, infection, skin disruption or discoloration. Laterality was confirmed (timeout). The hip was prepped with alcohol.  Deep ultrasound was used to visualize the femoral head, neck, and acetabular rim. 22-gauge spinal needle was used. Ultrasound-guided needle localization to the hip joint was performed. A formulation of 2cc of 40mg/ml Kenalog, 1cc of 1% lidocaine, 1cc of 0.25% sensoricaine was injected into the hip. Appropriate insufflation deep to the hip capsule was visualized. The site was cleaned and dressed with a band aid.  He tolerated this well and there were no complications. 40% of her pain was relieved after injection. We discussed surgical options as well, should conservative measures fail. Electronically signed by Dahiana Clements MD on 8/5/2021 at 12:27 PM  This dictation was generated by voice recognition computer software. Although all attempts are made to edit the dictation for accuracy, there may be errors in the transcription that are not intended.

## 2021-08-09 RX ORDER — HYDROCORTISONE 25 MG/G
CREAM TOPICAL
Qty: 1 TUBE | Refills: 0 | Status: SHIPPED | OUTPATIENT
Start: 2021-08-09 | End: 2021-09-21 | Stop reason: SDUPTHER

## 2021-08-09 NOTE — TELEPHONE ENCOUNTER
Alex Dangelojose [phone] (home)    is requesting refill(s) of medication hydrocortisone (ANUSOL-HC) 2.5 % CREA rectal cream to preferred pharmacy [address], [address] [facility]     Last OV 7/8/21 (pertaining to medication)   Last refill 7/8/21 (per medication requested)  Next office visit scheduled or attempted Yes  Date 7/14/22  If No, reason made.

## 2021-09-21 RX ORDER — HYDROCORTISONE 25 MG/G
CREAM TOPICAL
Qty: 1 EACH | Refills: 5 | Status: SHIPPED | OUTPATIENT
Start: 2021-09-21 | End: 2022-10-31 | Stop reason: SDUPTHER

## 2021-09-21 NOTE — TELEPHONE ENCOUNTER
Dustin Kawasaki [phone] (home)    is requesting refill(s) of medication Anusol  to preferred pharmacy [address], [facility] 07-08-21 (pertaining to medication)   Last refill 08-09-21 (per medication requested)  Next office visit scheduled or attempted Yes  Date 07-14-22  If No, reason made

## 2022-06-27 ENCOUNTER — PATIENT MESSAGE (OUTPATIENT)
Dept: FAMILY MEDICINE CLINIC | Age: 77
End: 2022-06-27

## 2022-06-27 RX ORDER — ATORVASTATIN CALCIUM 10 MG/1
10 TABLET, FILM COATED ORAL DAILY
Qty: 90 TABLET | Refills: 1 | Status: SHIPPED | OUTPATIENT
Start: 2022-06-27 | End: 2022-10-31 | Stop reason: SDUPTHER

## 2022-06-27 NOTE — TELEPHONE ENCOUNTER
Meliza Martinez is requesting refill(s) lipitor  Last OV 7/8/21 (pertaining to medication)  LR 7/8/21 (per medication requested)  Next office visit scheduled or attempted Yes   If no, reason:  7/14/22

## 2022-06-27 NOTE — TELEPHONE ENCOUNTER
From: James Ward  To: Dr. Adryan Funes: 6/27/2022 9:49 AM EDT  Subject: Prescription for ATORVASTATIN    Please renew my prescription for ATORVASTIN.  I am currently out of this prescription and would appreciate it's renewal.

## 2022-07-14 ENCOUNTER — TELEPHONE (OUTPATIENT)
Dept: FAMILY MEDICINE CLINIC | Age: 77
End: 2022-07-14

## 2022-07-14 ENCOUNTER — OFFICE VISIT (OUTPATIENT)
Dept: FAMILY MEDICINE CLINIC | Age: 77
End: 2022-07-14
Payer: MEDICARE

## 2022-07-14 VITALS
BODY MASS INDEX: 26.23 KG/M2 | WEIGHT: 183.2 LBS | HEIGHT: 70 IN | DIASTOLIC BLOOD PRESSURE: 86 MMHG | OXYGEN SATURATION: 96 % | HEART RATE: 57 BPM | SYSTOLIC BLOOD PRESSURE: 126 MMHG

## 2022-07-14 DIAGNOSIS — E78.5 DYSLIPIDEMIA: Primary | ICD-10-CM

## 2022-07-14 DIAGNOSIS — Z12.5 SCREENING PSA (PROSTATE SPECIFIC ANTIGEN): ICD-10-CM

## 2022-07-14 PROBLEM — M25.551 RIGHT HIP PAIN: Status: RESOLVED | Noted: 2021-07-08 | Resolved: 2022-07-14

## 2022-07-14 LAB
A/G RATIO: 1.7 (ref 1.1–2.2)
ALBUMIN SERPL-MCNC: 4.7 G/DL (ref 3.4–5)
ALP BLD-CCNC: 82 U/L (ref 40–129)
ALT SERPL-CCNC: 22 U/L (ref 10–40)
ANION GAP SERPL CALCULATED.3IONS-SCNC: 13 MMOL/L (ref 3–16)
AST SERPL-CCNC: 20 U/L (ref 15–37)
BILIRUB SERPL-MCNC: 0.8 MG/DL (ref 0–1)
BUN BLDV-MCNC: 18 MG/DL (ref 7–20)
CALCIUM SERPL-MCNC: 9.6 MG/DL (ref 8.3–10.6)
CHLORIDE BLD-SCNC: 105 MMOL/L (ref 99–110)
CHOLESTEROL, TOTAL: 171 MG/DL (ref 0–199)
CO2: 24 MMOL/L (ref 21–32)
CREAT SERPL-MCNC: 0.8 MG/DL (ref 0.8–1.3)
GFR AFRICAN AMERICAN: >60
GFR NON-AFRICAN AMERICAN: >60
GLUCOSE BLD-MCNC: 102 MG/DL (ref 70–99)
HDLC SERPL-MCNC: 66 MG/DL (ref 40–60)
LDL CHOLESTEROL CALCULATED: 86 MG/DL
POTASSIUM SERPL-SCNC: 4.1 MMOL/L (ref 3.5–5.1)
PROSTATE SPECIFIC ANTIGEN: 1.56 NG/ML (ref 0–4)
SODIUM BLD-SCNC: 142 MMOL/L (ref 136–145)
TOTAL PROTEIN: 7.5 G/DL (ref 6.4–8.2)
TRIGL SERPL-MCNC: 96 MG/DL (ref 0–150)
VLDLC SERPL CALC-MCNC: 19 MG/DL

## 2022-07-14 PROCEDURE — 1123F ACP DISCUSS/DSCN MKR DOCD: CPT | Performed by: FAMILY MEDICINE

## 2022-07-14 PROCEDURE — G8427 DOCREV CUR MEDS BY ELIG CLIN: HCPCS | Performed by: FAMILY MEDICINE

## 2022-07-14 PROCEDURE — 99213 OFFICE O/P EST LOW 20 MIN: CPT | Performed by: FAMILY MEDICINE

## 2022-07-14 PROCEDURE — G8417 CALC BMI ABV UP PARAM F/U: HCPCS | Performed by: FAMILY MEDICINE

## 2022-07-14 PROCEDURE — 1036F TOBACCO NON-USER: CPT | Performed by: FAMILY MEDICINE

## 2022-07-14 PROCEDURE — 3017F COLORECTAL CA SCREEN DOC REV: CPT | Performed by: FAMILY MEDICINE

## 2022-07-14 PROCEDURE — 36415 COLL VENOUS BLD VENIPUNCTURE: CPT | Performed by: FAMILY MEDICINE

## 2022-07-14 ASSESSMENT — ENCOUNTER SYMPTOMS
SHORTNESS OF BREATH: 0
BLOOD IN STOOL: 0
DIARRHEA: 0
EYE DISCHARGE: 0
EYE PAIN: 0
WHEEZING: 0
COUGH: 0
VOMITING: 0
CHEST TIGHTNESS: 0
ABDOMINAL PAIN: 0
CONSTIPATION: 0

## 2022-07-14 ASSESSMENT — PATIENT HEALTH QUESTIONNAIRE - PHQ9
SUM OF ALL RESPONSES TO PHQ QUESTIONS 1-9: 0
SUM OF ALL RESPONSES TO PHQ QUESTIONS 1-9: 0
SUM OF ALL RESPONSES TO PHQ9 QUESTIONS 1 & 2: 0
SUM OF ALL RESPONSES TO PHQ QUESTIONS 1-9: 0
1. LITTLE INTEREST OR PLEASURE IN DOING THINGS: 0
SUM OF ALL RESPONSES TO PHQ QUESTIONS 1-9: 0
2. FEELING DOWN, DEPRESSED OR HOPELESS: 0

## 2022-07-14 NOTE — PATIENT INSTRUCTIONS

## 2022-07-14 NOTE — PROGRESS NOTES
Subjective:      Patient ID: Gail Dodson is a 77 y.o. male. HPI    Chief Complaint   Patient presents with    Hyperlipidemia     Pt is here for follow up and FBW     Here for checkup on hyperlipidemia. Has been on medicine for about 4 years. Denies any problems with medication. Denies any muscle pain or rash. Denies any chest pain shortness of breath or dizziness  Gail Dodson is a 77 y.o. male with the following history as recorded in [facility]:  Patient Active Problem List    Diagnosis Date Noted    External hemorrhoid 07/08/2021    Dyslipidemia 08/08/2018     Current Outpatient Medications   Medication Sig Dispense Refill    atorvastatin (LIPITOR) 10 MG tablet Take 1 tablet by mouth daily 90 tablet 1    hydrocortisone (ANUSOL-HC) 2.5 % CREA rectal cream Apply to hemorrhoids bid 1 each 5    ibuprofen (ADVIL;MOTRIN) 200 MG tablet Take 200 mg by mouth every 6 hours as needed for Pain       No current facility-administered medications for this visit. Allergies: Tramadol hcl er  Past Medical History:   Diagnosis Date    Elevated blood pressure reading without diagnosis of hypertension      Past Surgical History:   Procedure Laterality Date    FINGER AMPUTATION Right     middle finger    KNEE CARTILAGE SURGERY Left     SHOULDER SURGERY Right     bone spur removed.  THUMB AMPUTATION Left     tip of thumb     Family History   Problem Relation Age of Onset    Heart Disease Mother     High Cholesterol Mother     Heart Disease Father     Heart Disease Maternal Grandmother     High Blood Pressure Brother      Social History     Tobacco Use    Smoking status: Never Smoker    Smokeless tobacco: Never Used   Substance Use Topics    Alcohol use:  Yes     Alcohol/week: 0.0 - 2.0 standard drinks     Vitals:    07/14/22 0801   BP: 126/86   Site: Right Upper Arm   Position: Sitting   Pulse: 57   SpO2: 96%   Weight: 183 lb 3.2 oz (83.1 kg)   Height: 5' 10\" (1.778 m)     Body mass index is 26.29 kg/m². Wt Readings from Last 3 Encounters:   07/14/22 183 lb 3.2 oz (83.1 kg)   08/05/21 186 lb (84.4 kg)   07/08/21 186 lb 3.2 oz (84.5 kg)     BP Readings from Last 3 Encounters:   07/14/22 126/86   07/08/21 138/80   04/14/21 138/88        Review of Systems   Constitutional: Negative for fatigue, fever and unexpected weight change. Eyes: Negative for pain, discharge and visual disturbance. Respiratory: Negative for cough, chest tightness, shortness of breath and wheezing. Cardiovascular: Negative for chest pain, palpitations and leg swelling. Gastrointestinal: Negative for abdominal pain, blood in stool, constipation, diarrhea and vomiting. Genitourinary: Negative for difficulty urinating, dysuria and hematuria. Neurological: Negative for dizziness, seizures, syncope and headaches. Psychiatric/Behavioral: Negative for dysphoric mood and sleep disturbance. The patient is not nervous/anxious. Objective:   Physical Exam  Vitals and nursing note reviewed. Constitutional:       General: He is not in acute distress. Appearance: Normal appearance. He is well-developed. HENT:      Head: Normocephalic. Right Ear: External ear normal.      Left Ear: External ear normal.      Nose: No rhinorrhea. Neck:      Thyroid: No thyromegaly. Cardiovascular:      Rate and Rhythm: Normal rate and regular rhythm. Heart sounds: Normal heart sounds. No murmur heard. Pulmonary:      Effort: Pulmonary effort is normal.      Breath sounds: Normal breath sounds. No wheezing. Abdominal:      General: Bowel sounds are normal. There is no distension. Palpations: Abdomen is soft. Tenderness: There is no abdominal tenderness. There is no guarding or rebound. Musculoskeletal:         General: Normal range of motion. Right lower leg: No edema. Left lower leg: No edema. Skin:     General: Skin is warm. Coloration: Skin is not jaundiced. Findings: No erythema. Neurological:      General: No focal deficit present. Mental Status: He is alert and oriented to person, place, and time.    Psychiatric:         Mood and Affect: Mood normal.         Behavior: Behavior normal.         Assessment:      HLD- on med- check lipid      Plan:      Orders Placed This Encounter   Procedures    LIPID PANEL     Order Specific Question:   Is Patient Fasting?/# of Hours     Answer:   12    Comprehensive Metabolic Panel    PSA Screening     Refill meds          AGNES Briones [address], DO

## 2022-07-20 ENCOUNTER — TELEMEDICINE (OUTPATIENT)
Dept: FAMILY MEDICINE CLINIC | Age: 77
End: 2022-07-20
Payer: MEDICARE

## 2022-07-20 DIAGNOSIS — Z00.00 MEDICARE ANNUAL WELLNESS VISIT, SUBSEQUENT: Primary | ICD-10-CM

## 2022-07-20 PROCEDURE — G0439 PPPS, SUBSEQ VISIT: HCPCS | Performed by: FAMILY MEDICINE

## 2022-07-20 PROCEDURE — 1123F ACP DISCUSS/DSCN MKR DOCD: CPT | Performed by: FAMILY MEDICINE

## 2022-07-20 ASSESSMENT — PATIENT HEALTH QUESTIONNAIRE - PHQ9
SUM OF ALL RESPONSES TO PHQ QUESTIONS 1-9: 0
1. LITTLE INTEREST OR PLEASURE IN DOING THINGS: 0
SUM OF ALL RESPONSES TO PHQ9 QUESTIONS 1 & 2: 0
2. FEELING DOWN, DEPRESSED OR HOPELESS: 0

## 2022-07-20 ASSESSMENT — LIFESTYLE VARIABLES
HOW OFTEN DO YOU HAVE A DRINK CONTAINING ALCOHOL: MONTHLY OR LESS
HOW MANY STANDARD DRINKS CONTAINING ALCOHOL DO YOU HAVE ON A TYPICAL DAY: 1 OR 2

## 2022-07-20 NOTE — PROGRESS NOTES
Medicare Annual Wellness Visit    Chioma Henry is here for No chief complaint on file. Assessment & Plan   Medicare annual wellness visit, subsequent    Recommendations for Preventive Services Due: see orders and patient instructions/AVS.  Recommended screening schedule for the next 5-10 years is provided to the patient in written form: see Patient Instructions/AVS.     No follow-ups on file. Subjective       Patient's complete Health Risk Assessment and screening values have been reviewed and are found in Flowsheets. The following problems were reviewed today and where indicated follow up appointments were made and/or referrals ordered. Positive Risk Factor Screenings with Interventions:             General Health and ACP:  General  In general, how would you say your health is?: Very Good  In the past 7 days, have you experienced any of the following: New or Increased Pain, New or Increased Fatigue, Loneliness, Social Isolation, Stress or Anger?: No  Do you get the social and emotional support that you need?: Yes  Do you have a Living Will?: (!) No    Advance Directives       Power of  Living Will ACP-Advance Directive ACP-Power of     Not on File Not on File Not on File Not on File          General Health Risk Interventions:  No Living Will: Patient declines ACP discussion/assistance              Objective      Patient-Reported Vitals  Patient-Reported Weight: 180lb  Patient-Reported Height: 5' 10\"     Patient did not monitor blood pressure at home       Allergies   Allergen Reactions    Tramadol Hcl Er Other (See Comments)     Causes more pain that he had before. Prior to Visit Medications    Medication Sig Taking?  Authorizing Provider   atorvastatin (LIPITOR) 10 MG tablet Take 1 tablet by mouth daily  Nancy Hernandez DO   hydrocortisone (ANUSOL-HC) 2.5 % CREA rectal cream Apply to hemorrhoids bid  Patient not taking: Reported on 7/14/2022  DO Alisa Cardona

## 2022-07-20 NOTE — PATIENT INSTRUCTIONS
Personalized Preventive Plan for Beth Marlow - 7/20/2022  Medicare offers a range of preventive health benefits. Some of the tests and screenings are paid in full while other may be subject to a deductible, co-insurance, and/or copay. Some of these benefits include a comprehensive review of your medical history including lifestyle, illnesses that may run in your family, and various assessments and screenings as appropriate. After reviewing your medical record and screening and assessments performed today your provider may have ordered immunizations, labs, imaging, and/or referrals for you. A list of these orders (if applicable) as well as your Preventive Care list are included within your After Visit Summary for your review. Other Preventive Recommendations:    A preventive eye exam performed by an eye specialist is recommended every 1-2 years to screen for glaucoma; cataracts, macular degeneration, and other eye disorders. A preventive dental visit is recommended every 6 months. Try to get at least 150 minutes of exercise per week or 10,000 steps per day on a pedometer . Order or download the FREE \"Exercise & Physical Activity: Your Everyday Guide\" from [employer]. Call 4-537.253.7517 or search [employer] online. You need 4487-8857 mg of calcium and 5311-1984 IU of vitamin D per day. It is possible to meet your calcium requirement with diet alone, but a vitamin D supplement is usually necessary to meet this goal.  When exposed to the sun, use a sunscreen that protects against both UVA and UVB radiation with an SPF of 30 or greater. Reapply every 2 to 3 hours or after sweating, drying off with a towel, or swimming. Always wear a seat belt when traveling in a car. Always wear a helmet when riding a bicycle or motorcycle.

## 2022-10-14 ENCOUNTER — TELEPHONE (OUTPATIENT)
Dept: FAMILY MEDICINE CLINIC | Age: 77
End: 2022-10-14

## 2022-10-14 NOTE — TELEPHONE ENCOUNTER
Per phone encounter 7/14/2022 patient is to continue care with JOSE D Westbrook. Please help patient to schedule if needed to discuss referral to Neurology.

## 2022-10-14 NOTE — TELEPHONE ENCOUNTER
----- Message from Melany Raya sent at 10/14/2022 10:03 AM EDT -----  Subject: Message to Provider    QUESTIONS  Information for Provider? Was a PT of Dr. Suyapa Parrish. PT is asking who his new   provider is b/c he is requesting a referral to neurology. Please contact   to advise.   ---------------------------------------------------------------------------  --------------  [facility] INFO  [phone]; OK to leave message on voicemail  ---------------------------------------------------------------------------  --------------  SCRIPT ANSWERS  Relationship to Patient?  Self

## 2022-10-31 ENCOUNTER — OFFICE VISIT (OUTPATIENT)
Dept: FAMILY MEDICINE CLINIC | Age: 77
End: 2022-10-31
Payer: MEDICARE

## 2022-10-31 VITALS
BODY MASS INDEX: 26.34 KG/M2 | HEART RATE: 106 BPM | SYSTOLIC BLOOD PRESSURE: 140 MMHG | WEIGHT: 184 LBS | DIASTOLIC BLOOD PRESSURE: 92 MMHG | OXYGEN SATURATION: 96 % | HEIGHT: 70 IN

## 2022-10-31 DIAGNOSIS — E78.5 DYSLIPIDEMIA: ICD-10-CM

## 2022-10-31 DIAGNOSIS — R41.3 MEMORY CHANGES: Primary | ICD-10-CM

## 2022-10-31 DIAGNOSIS — K64.4 EXTERNAL HEMORRHOID: ICD-10-CM

## 2022-10-31 PROCEDURE — 1123F ACP DISCUSS/DSCN MKR DOCD: CPT | Performed by: PHYSICIAN ASSISTANT

## 2022-10-31 PROCEDURE — G8427 DOCREV CUR MEDS BY ELIG CLIN: HCPCS | Performed by: PHYSICIAN ASSISTANT

## 2022-10-31 PROCEDURE — G8417 CALC BMI ABV UP PARAM F/U: HCPCS | Performed by: PHYSICIAN ASSISTANT

## 2022-10-31 PROCEDURE — G8484 FLU IMMUNIZE NO ADMIN: HCPCS | Performed by: PHYSICIAN ASSISTANT

## 2022-10-31 PROCEDURE — 99214 OFFICE O/P EST MOD 30 MIN: CPT | Performed by: PHYSICIAN ASSISTANT

## 2022-10-31 PROCEDURE — 1036F TOBACCO NON-USER: CPT | Performed by: PHYSICIAN ASSISTANT

## 2022-10-31 RX ORDER — ATORVASTATIN CALCIUM 10 MG/1
10 TABLET, FILM COATED ORAL DAILY
Qty: 90 TABLET | Refills: 1 | Status: SHIPPED | OUTPATIENT
Start: 2022-10-31

## 2022-10-31 RX ORDER — HYDROCORTISONE 25 MG/G
CREAM TOPICAL
Qty: 1 EACH | Refills: 5 | Status: SHIPPED | OUTPATIENT
Start: 2022-10-31

## 2022-10-31 ASSESSMENT — ENCOUNTER SYMPTOMS: RESPIRATORY NEGATIVE: 1

## 2022-10-31 NOTE — PROGRESS NOTES
Subjective:      Patient ID: Pato Carter is a 69 y.o. male. HPI  Patient here today to establish care. He reports he is feeling ok. He states his son and brother want him to see a neurologist, when questioned why he is really not sure. Maybe memory/personality changes. They think he may have had a mini stroke. Spoke with patients brother with patient in the room. Brother reports he has noticed a major personality change over the last year, very isolated, no longer social. Very regimented in his activities. Reports patient had to be picked up at [facility] because he could not find his care to drive home. They played golf and patient was unable to add the score for 9 holes of golf- patient was an . Patient reports depression after much questioning. Has urge to swerve into traffic but does not want to ruin someones day. Thought of killing himself but lives with his son and does not want his son to have to find him so says he would never do it. Review of Systems   Constitutional: Negative. Respiratory: Negative. Cardiovascular: Negative. Neurological:         Memory changes   Psychiatric/Behavioral:  Positive for confusion and suicidal ideas. Objective:   Physical Exam  Constitutional:       Appearance: Normal appearance. Cardiovascular:      Rate and Rhythm: Normal rate and regular rhythm. Heart sounds: Normal heart sounds. Pulmonary:      Effort: Pulmonary effort is normal.      Breath sounds: Normal breath sounds. Neurological:      Mental Status: He is alert and oriented to person, place, and time. Assessment / Plan:          Diagnosis Orders   1. Memory changes  [facility]      2. Dyslipidemia  Continue lipitor 10mg      3. External hemorrhoid  Continue anusol HC as needed. SLUMS exam completed and scanned to chart. 19/30 showing clear dementia. Spoke with his son about finding and depression symptoms.  They request referral to neurology. Also discussed possible referral to [facility] at [facility] with Dr. Rafael Bassett. Spent >45 mnutes with patient today.

## 2022-11-18 ENCOUNTER — OFFICE VISIT (OUTPATIENT)
Dept: FAMILY MEDICINE CLINIC | Age: 77
End: 2022-11-18
Payer: MEDICARE

## 2022-11-18 VITALS
OXYGEN SATURATION: 98 % | HEIGHT: 70 IN | BODY MASS INDEX: 26.63 KG/M2 | WEIGHT: 186 LBS | HEART RATE: 96 BPM | DIASTOLIC BLOOD PRESSURE: 70 MMHG | SYSTOLIC BLOOD PRESSURE: 156 MMHG

## 2022-11-18 DIAGNOSIS — F32.2 CURRENT SEVERE EPISODE OF MAJOR DEPRESSIVE DISORDER WITHOUT PSYCHOTIC FEATURES WITHOUT PRIOR EPISODE (HCC): Primary | ICD-10-CM

## 2022-11-18 PROCEDURE — 1123F ACP DISCUSS/DSCN MKR DOCD: CPT | Performed by: PHYSICIAN ASSISTANT

## 2022-11-18 PROCEDURE — G8417 CALC BMI ABV UP PARAM F/U: HCPCS | Performed by: PHYSICIAN ASSISTANT

## 2022-11-18 PROCEDURE — G8484 FLU IMMUNIZE NO ADMIN: HCPCS | Performed by: PHYSICIAN ASSISTANT

## 2022-11-18 PROCEDURE — 99213 OFFICE O/P EST LOW 20 MIN: CPT | Performed by: PHYSICIAN ASSISTANT

## 2022-11-18 PROCEDURE — G8427 DOCREV CUR MEDS BY ELIG CLIN: HCPCS | Performed by: PHYSICIAN ASSISTANT

## 2022-11-18 PROCEDURE — 1036F TOBACCO NON-USER: CPT | Performed by: PHYSICIAN ASSISTANT

## 2022-11-18 RX ORDER — CITALOPRAM 10 MG/1
10 TABLET ORAL DAILY
Qty: 30 TABLET | Refills: 3 | Status: SHIPPED | OUTPATIENT
Start: 2022-11-18

## 2022-11-18 ASSESSMENT — COLUMBIA-SUICIDE SEVERITY RATING SCALE - C-SSRS
3. HAVE YOU BEEN THINKING ABOUT HOW YOU MIGHT KILL YOURSELF?: YES
5. HAVE YOU STARTED TO WORK OUT OR WORKED OUT THE DETAILS OF HOW TO KILL YOURSELF? DO YOU INTEND TO CARRY OUT THIS PLAN?: NO
4. HAVE YOU HAD THESE THOUGHTS AND HAD SOME INTENTION OF ACTING ON THEM?: YES
1. WITHIN THE PAST MONTH, HAVE YOU WISHED YOU WERE DEAD OR WISHED YOU COULD GO TO SLEEP AND NOT WAKE UP?: NO
2. HAVE YOU ACTUALLY HAD ANY THOUGHTS OF KILLING YOURSELF?: YES
6. HAVE YOU EVER DONE ANYTHING, STARTED TO DO ANYTHING, OR PREPARED TO DO ANYTHING TO END YOUR LIFE?: NO

## 2022-11-18 ASSESSMENT — PATIENT HEALTH QUESTIONNAIRE - PHQ9
4. FEELING TIRED OR HAVING LITTLE ENERGY: 3
2. FEELING DOWN, DEPRESSED OR HOPELESS: 3
1. LITTLE INTEREST OR PLEASURE IN DOING THINGS: 3
SUM OF ALL RESPONSES TO PHQ QUESTIONS 1-9: 18
10. IF YOU CHECKED OFF ANY PROBLEMS, HOW DIFFICULT HAVE THESE PROBLEMS MADE IT FOR YOU TO DO YOUR WORK, TAKE CARE OF THINGS AT HOME, OR GET ALONG WITH OTHER PEOPLE: 3
6. FEELING BAD ABOUT YOURSELF - OR THAT YOU ARE A FAILURE OR HAVE LET YOURSELF OR YOUR FAMILY DOWN: 3
5. POOR APPETITE OR OVEREATING: 0
9. THOUGHTS THAT YOU WOULD BE BETTER OFF DEAD, OR OF HURTING YOURSELF: 3
3. TROUBLE FALLING OR STAYING ASLEEP: 3
SUM OF ALL RESPONSES TO PHQ QUESTIONS 1-9: 15
SUM OF ALL RESPONSES TO PHQ QUESTIONS 1-9: 18
7. TROUBLE CONCENTRATING ON THINGS, SUCH AS READING THE NEWSPAPER OR WATCHING TELEVISION: 0
SUM OF ALL RESPONSES TO PHQ QUESTIONS 1-9: 18
SUM OF ALL RESPONSES TO PHQ9 QUESTIONS 1 & 2: 6
8. MOVING OR SPEAKING SO SLOWLY THAT OTHER PEOPLE COULD HAVE NOTICED. OR THE OPPOSITE, BEING SO FIGETY OR RESTLESS THAT YOU HAVE BEEN MOVING AROUND A LOT MORE THAN USUAL: 0

## 2022-11-18 ASSESSMENT — ENCOUNTER SYMPTOMS: RESPIRATORY NEGATIVE: 1

## 2022-11-18 NOTE — PROGRESS NOTES
Subjective:      Patient ID: Rosalba Humphries is a 69 y.o. male. Patient presents with depression and SI. This was discusses at his last visit but was resistant to medication. He presents today with his son and is agreeable to start medication. Patient lives at home with son. He has removed all guns from the home as this was a suicidal. thought though patient denies he would go through with it. He is being work up by neurology for the memory issues and personality changes. Review of Systems   Constitutional: Negative. Respiratory: Negative. Cardiovascular: Negative. Psychiatric/Behavioral:  Positive for dysphoric mood and suicidal ideas. The patient is nervous/anxious. Objective:   Physical Exam  Constitutional:       Appearance: Normal appearance. Neurological:      Mental Status: He is alert. Mental status is at baseline. Psychiatric:      Comments: Depressed mood with SI  Inappropriate laughter and facial expressions at times       Assessment / Plan:          Diagnosis Orders   1. Current severe episode of major depressive disorder without psychotic features without prior episode ([facility] [address]  Will start patient on celexa 10mg with follow up in 3 weeks. Is seeing neurology for work up. Lives with son. Patient contracts for safety.

## 2022-12-09 ENCOUNTER — OFFICE VISIT (OUTPATIENT)
Dept: FAMILY MEDICINE CLINIC | Age: 77
End: 2022-12-09

## 2022-12-09 VITALS
OXYGEN SATURATION: 96 % | HEIGHT: 70 IN | DIASTOLIC BLOOD PRESSURE: 70 MMHG | SYSTOLIC BLOOD PRESSURE: 136 MMHG | HEART RATE: 80 BPM | BODY MASS INDEX: 26.23 KG/M2 | WEIGHT: 183.2 LBS

## 2022-12-09 DIAGNOSIS — F32.A ANXIETY AND DEPRESSION: Primary | ICD-10-CM

## 2022-12-09 DIAGNOSIS — R41.3 MEMORY CHANGES: ICD-10-CM

## 2022-12-09 DIAGNOSIS — F41.9 ANXIETY AND DEPRESSION: Primary | ICD-10-CM

## 2022-12-09 SDOH — ECONOMIC STABILITY: FOOD INSECURITY: WITHIN THE PAST 12 MONTHS, THE FOOD YOU BOUGHT JUST DIDN'T LAST AND YOU DIDN'T HAVE MONEY TO GET MORE.: NEVER TRUE

## 2022-12-09 SDOH — ECONOMIC STABILITY: FOOD INSECURITY: WITHIN THE PAST 12 MONTHS, YOU WORRIED THAT YOUR FOOD WOULD RUN OUT BEFORE YOU GOT MONEY TO BUY MORE.: NEVER TRUE

## 2022-12-09 ASSESSMENT — SOCIAL DETERMINANTS OF HEALTH (SDOH): HOW HARD IS IT FOR YOU TO PAY FOR THE VERY BASICS LIKE FOOD, HOUSING, MEDICAL CARE, AND HEATING?: NOT HARD AT ALL

## 2022-12-09 ASSESSMENT — ENCOUNTER SYMPTOMS
GASTROINTESTINAL NEGATIVE: 1
RESPIRATORY NEGATIVE: 1

## 2022-12-09 NOTE — PROGRESS NOTES
Subjective:      Patient ID: Willis Davis is a 69 y.o. male. DepressionPatient presents with the following symptoms: nervousness/anxiety. Patient presents for follow up of anxiety and depression. He presents with son today. He feels the medication has been helpful. Feels 80% better. Side effects were short lived the first 1-2 weeks but is now tolerating well. Review of Systems   Constitutional: Negative. Respiratory: Negative. Cardiovascular: Negative. Gastrointestinal: Negative. Genitourinary: Negative. Psychiatric/Behavioral:  Positive for depression and dysphoric mood. The patient is nervous/anxious. Objective:   Physical Exam  Constitutional:       Appearance: Normal appearance. He is normal weight. Pulmonary:      Effort: Pulmonary effort is normal.   Skin:     General: Skin is warm and dry. Neurological:      Mental Status: He is alert. Mental status is at baseline. Psychiatric:      Comments: improved       Assessment / Plan:          Diagnosis Orders   1. Anxiety and depression  Will continue with celexa at 10mg daily. 2. Memory changes  Has seen neurology with normal work up thus far. Referral made to [facility] for evaluation.

## 2022-12-19 ENCOUNTER — OFFICE VISIT (OUTPATIENT)
Dept: FAMILY MEDICINE CLINIC | Age: 77
End: 2022-12-19
Payer: MEDICARE

## 2022-12-19 ENCOUNTER — TELEPHONE (OUTPATIENT)
Dept: FAMILY MEDICINE CLINIC | Age: 77
End: 2022-12-19

## 2022-12-19 VITALS
HEIGHT: 70 IN | SYSTOLIC BLOOD PRESSURE: 138 MMHG | OXYGEN SATURATION: 97 % | DIASTOLIC BLOOD PRESSURE: 80 MMHG | HEART RATE: 68 BPM | TEMPERATURE: 97.9 F | BODY MASS INDEX: 26.14 KG/M2 | RESPIRATION RATE: 18 BRPM | WEIGHT: 182.6 LBS

## 2022-12-19 DIAGNOSIS — R31.9 HEMATURIA, UNSPECIFIED TYPE: ICD-10-CM

## 2022-12-19 DIAGNOSIS — N39.0 ACUTE UTI (URINARY TRACT INFECTION): Primary | ICD-10-CM

## 2022-12-19 DIAGNOSIS — R35.0 URINARY FREQUENCY: ICD-10-CM

## 2022-12-19 DIAGNOSIS — R30.0 DYSURIA: ICD-10-CM

## 2022-12-19 DIAGNOSIS — T50.905A ADVERSE EFFECT OF DRUG, INITIAL ENCOUNTER: ICD-10-CM

## 2022-12-19 LAB
BILIRUBIN, POC: NEGATIVE
BLOOD URINE, POC: ABNORMAL
CLARITY, POC: ABNORMAL
COLOR, POC: ABNORMAL
GLUCOSE URINE, POC: NEGATIVE
KETONES, POC: NEGATIVE
LEUKOCYTE EST, POC: ABNORMAL
NITRITE, POC: NEGATIVE
PH, POC: 6
PROTEIN, POC: 30
SPECIFIC GRAVITY, POC: >=1.03
UROBILINOGEN, POC: 0.2

## 2022-12-19 PROCEDURE — G8484 FLU IMMUNIZE NO ADMIN: HCPCS | Performed by: PHYSICIAN ASSISTANT

## 2022-12-19 PROCEDURE — G8427 DOCREV CUR MEDS BY ELIG CLIN: HCPCS | Performed by: PHYSICIAN ASSISTANT

## 2022-12-19 PROCEDURE — 1036F TOBACCO NON-USER: CPT | Performed by: PHYSICIAN ASSISTANT

## 2022-12-19 PROCEDURE — G8417 CALC BMI ABV UP PARAM F/U: HCPCS | Performed by: PHYSICIAN ASSISTANT

## 2022-12-19 PROCEDURE — 1123F ACP DISCUSS/DSCN MKR DOCD: CPT | Performed by: PHYSICIAN ASSISTANT

## 2022-12-19 PROCEDURE — 99214 OFFICE O/P EST MOD 30 MIN: CPT | Performed by: PHYSICIAN ASSISTANT

## 2022-12-19 PROCEDURE — 81002 URINALYSIS NONAUTO W/O SCOPE: CPT | Performed by: PHYSICIAN ASSISTANT

## 2022-12-19 RX ORDER — SULFAMETHOXAZOLE AND TRIMETHOPRIM 800; 160 MG/1; MG/1
1 TABLET ORAL 2 TIMES DAILY
Qty: 14 TABLET | Refills: 0 | Status: SHIPPED | OUTPATIENT
Start: 2022-12-19 | End: 2022-12-26

## 2022-12-19 NOTE — TELEPHONE ENCOUNTER
----- Message from [employer] sent at 12/19/2022  9:46 AM EST -----  Subject: Medication Problem    Medication: citalopram (CELEXA) 10 MG tablet  Dosage: once daily  Ordering Provider: adán    Question/Problem: patient is calling because medication is causing him to   have urinary frequency. Please contact the patient to advise.        Pharmacy: [address] [phone]    ---------------------------------------------------------------------------  --------------  Josefina Quiros INFO  [phone]; OK to leave message on voicemail  ---------------------------------------------------------------------------  --------------    SCRIPT ANSWERS  Relationship to Patient: Self

## 2022-12-19 NOTE — PROGRESS NOTES
[address] [facility]    CC:   Chief Complaint   Patient presents with    Urinary Frequency     Patient is here with frequent  urination and some burning when urinating. Symptoms began in November    Dysuria       History of Present Illness:    Kuldeep Perez is a 69 y.o. male who complains dysuria and nocturia with incomplete emptying since early Nov right after starting Celexa of which anxiety and depression has improved he states about 50%. Denies prior urinary issues. No  h/o prostate issues. NO hematuria. No erectile dysfunction. Has increase BMs, more than usual and formed. Has not changed  water intake. Physical exam:      Vitals:    12/19/22 1130   BP: 138/80   Pulse: 68   Resp: 18   Temp: 97.9 °F (36.6 °C)   TempSrc: Temporal   SpO2: 97%   Weight: 182 lb 9.6 oz (82.8 kg)   Height: 5' 10\" (1.778 m)     APPEARANCE:    No acute distress. HEART: Reg rate and rhythm. No murmurs, rubs, or gallops. LUNGS: Clear to auscultation. No wheezes, rales, or rhonchi. ABDOMEN:  Soft, bowel sounds present,  non-tender, no masses or HSM. No CVAT. NEUROLOGIC: grossly non focal  SKIN: Warm, dry, normal turgor. Cap refill <3secs. No rashes, petechiae, purpura. Laboratory:   Results for POC orders placed in visit on 12/19/22   POCT Urinalysis no Micro   Result Value Ref Range    Color, UA      Clarity, UA      Glucose, UA POC Negative     Bilirubin, UA Negative     Ketones, UA Negative     Spec Grav, UA >=1.030     Blood, UA POC Moderate     pH, UA 6.0     Protein, UA POC 30     Urobilinogen, UA 0.2     Leukocytes, UA Large     Nitrite, UA Negative       Urine culture Indicated/ordered       Assessment/ Plan      1. Acute UTI (urinary tract infection)    2. Adverse effect of drug, initial encounter    3. Dysuria    4. Urinary frequency    5. Hematuria, unspecified type     -research shows Celexa can cause dysuria and urinary changes. UA findings of UTI.   Discontinue Celexa  Start Bactrim x 7d  FOLLOW UP with Dionicio Westbrook in one week.        ( Total time spent with Andrew Wu was: >45mins with >50% of this time was spent counseling and coordinating the care of this patient.)

## 2022-12-19 NOTE — TELEPHONE ENCOUNTER
Pt states he is having urinary frequency and burning since starting citalopram in November.  Pt is scheduled today with [facility]

## 2022-12-21 DIAGNOSIS — N39.0 ACUTE UTI (URINARY TRACT INFECTION): Primary | ICD-10-CM

## 2022-12-21 LAB
ORGANISM: ABNORMAL
URINE CULTURE, ROUTINE: ABNORMAL

## 2022-12-21 RX ORDER — NITROFURANTOIN 25; 75 MG/1; MG/1
100 CAPSULE ORAL 2 TIMES DAILY
Qty: 14 CAPSULE | Refills: 0 | Status: SHIPPED | OUTPATIENT
Start: 2022-12-21 | End: 2022-12-28

## 2022-12-21 NOTE — RESULT ENCOUNTER NOTE
Patient was advised of results and physician's notes to stop the current bactrim and start new prescription for nitrofurantoin, he understood.

## 2022-12-27 ENCOUNTER — TELEPHONE (OUTPATIENT)
Dept: FAMILY MEDICINE CLINIC | Age: 77
End: 2022-12-27

## 2022-12-27 ENCOUNTER — OFFICE VISIT (OUTPATIENT)
Dept: FAMILY MEDICINE CLINIC | Age: 77
End: 2022-12-27
Payer: MEDICARE

## 2022-12-27 VITALS
WEIGHT: 183.6 LBS | DIASTOLIC BLOOD PRESSURE: 90 MMHG | BODY MASS INDEX: 26.28 KG/M2 | HEART RATE: 94 BPM | OXYGEN SATURATION: 96 % | HEIGHT: 70 IN | SYSTOLIC BLOOD PRESSURE: 136 MMHG

## 2022-12-27 DIAGNOSIS — F41.9 ANXIETY AND DEPRESSION: ICD-10-CM

## 2022-12-27 DIAGNOSIS — F32.A ANXIETY AND DEPRESSION: ICD-10-CM

## 2022-12-27 DIAGNOSIS — R35.0 URINARY FREQUENCY: Primary | ICD-10-CM

## 2022-12-27 LAB
BILIRUBIN, POC: NEGATIVE
BLOOD URINE, POC: NORMAL
CLARITY, POC: NORMAL
COLOR, POC: NORMAL
GLUCOSE URINE, POC: NEGATIVE
KETONES, POC: NEGATIVE
LEUKOCYTE EST, POC: NEGATIVE
NITRITE, POC: NEGATIVE
PH, POC: 5.5
PROTEIN, POC: NEGATIVE
SPECIFIC GRAVITY, POC: 1.03
UROBILINOGEN, POC: 0.2

## 2022-12-27 PROCEDURE — G8427 DOCREV CUR MEDS BY ELIG CLIN: HCPCS | Performed by: PHYSICIAN ASSISTANT

## 2022-12-27 PROCEDURE — 99214 OFFICE O/P EST MOD 30 MIN: CPT | Performed by: PHYSICIAN ASSISTANT

## 2022-12-27 PROCEDURE — G8417 CALC BMI ABV UP PARAM F/U: HCPCS | Performed by: PHYSICIAN ASSISTANT

## 2022-12-27 PROCEDURE — 1036F TOBACCO NON-USER: CPT | Performed by: PHYSICIAN ASSISTANT

## 2022-12-27 PROCEDURE — 1123F ACP DISCUSS/DSCN MKR DOCD: CPT | Performed by: PHYSICIAN ASSISTANT

## 2022-12-27 PROCEDURE — G8484 FLU IMMUNIZE NO ADMIN: HCPCS | Performed by: PHYSICIAN ASSISTANT

## 2022-12-27 PROCEDURE — 81002 URINALYSIS NONAUTO W/O SCOPE: CPT | Performed by: PHYSICIAN ASSISTANT

## 2022-12-27 RX ORDER — AMOXICILLIN 500 MG/1
500 CAPSULE ORAL 3 TIMES DAILY
Qty: 21 CAPSULE | Refills: 0 | Status: SHIPPED | OUTPATIENT
Start: 2022-12-27 | End: 2023-01-03

## 2022-12-27 NOTE — TELEPHONE ENCOUNTER
Pt wanted to know if he still can take his other medications with the amoxicillin. I advised he can take his atorvastatin.

## 2023-01-03 ENCOUNTER — OFFICE VISIT (OUTPATIENT)
Dept: FAMILY MEDICINE CLINIC | Age: 78
End: 2023-01-03
Payer: MEDICARE

## 2023-01-03 VITALS
BODY MASS INDEX: 26.26 KG/M2 | OXYGEN SATURATION: 98 % | SYSTOLIC BLOOD PRESSURE: 142 MMHG | DIASTOLIC BLOOD PRESSURE: 80 MMHG | HEART RATE: 88 BPM | WEIGHT: 183 LBS | TEMPERATURE: 98 F

## 2023-01-03 DIAGNOSIS — F32.2 CURRENT SEVERE EPISODE OF MAJOR DEPRESSIVE DISORDER WITHOUT PSYCHOTIC FEATURES WITHOUT PRIOR EPISODE (HCC): ICD-10-CM

## 2023-01-03 DIAGNOSIS — R35.0 URINARY FREQUENCY: Primary | ICD-10-CM

## 2023-01-03 LAB
BILIRUBIN, POC: NEGATIVE
BLOOD URINE, POC: ABNORMAL
CLARITY, POC: ABNORMAL
COLOR, POC: ABNORMAL
GLUCOSE URINE, POC: NEGATIVE
KETONES, POC: NEGATIVE
LEUKOCYTE EST, POC: NEGATIVE
NITRITE, POC: NEGATIVE
PH, POC: 6
PROTEIN, POC: NEGATIVE
SPECIFIC GRAVITY, POC: 1.02
UROBILINOGEN, POC: 0.2

## 2023-01-03 PROCEDURE — 1123F ACP DISCUSS/DSCN MKR DOCD: CPT | Performed by: PHYSICIAN ASSISTANT

## 2023-01-03 PROCEDURE — 99213 OFFICE O/P EST LOW 20 MIN: CPT | Performed by: PHYSICIAN ASSISTANT

## 2023-01-03 PROCEDURE — G8417 CALC BMI ABV UP PARAM F/U: HCPCS | Performed by: PHYSICIAN ASSISTANT

## 2023-01-03 PROCEDURE — 81002 URINALYSIS NONAUTO W/O SCOPE: CPT | Performed by: PHYSICIAN ASSISTANT

## 2023-01-03 PROCEDURE — G8484 FLU IMMUNIZE NO ADMIN: HCPCS | Performed by: PHYSICIAN ASSISTANT

## 2023-01-03 PROCEDURE — G8427 DOCREV CUR MEDS BY ELIG CLIN: HCPCS | Performed by: PHYSICIAN ASSISTANT

## 2023-01-03 PROCEDURE — 1036F TOBACCO NON-USER: CPT | Performed by: PHYSICIAN ASSISTANT

## 2023-01-03 ASSESSMENT — PATIENT HEALTH QUESTIONNAIRE - PHQ9
SUM OF ALL RESPONSES TO PHQ9 QUESTIONS 1 & 2: 0
SUM OF ALL RESPONSES TO PHQ QUESTIONS 1-9: 6
6. FEELING BAD ABOUT YOURSELF - OR THAT YOU ARE A FAILURE OR HAVE LET YOURSELF OR YOUR FAMILY DOWN: 0
SUM OF ALL RESPONSES TO PHQ QUESTIONS 1-9: 7
5. POOR APPETITE OR OVEREATING: 3
SUM OF ALL RESPONSES TO PHQ QUESTIONS 1-9: 7
10. IF YOU CHECKED OFF ANY PROBLEMS, HOW DIFFICULT HAVE THESE PROBLEMS MADE IT FOR YOU TO DO YOUR WORK, TAKE CARE OF THINGS AT HOME, OR GET ALONG WITH OTHER PEOPLE: 0
1. LITTLE INTEREST OR PLEASURE IN DOING THINGS: 0
SUM OF ALL RESPONSES TO PHQ QUESTIONS 1-9: 7
8. MOVING OR SPEAKING SO SLOWLY THAT OTHER PEOPLE COULD HAVE NOTICED. OR THE OPPOSITE, BEING SO FIGETY OR RESTLESS THAT YOU HAVE BEEN MOVING AROUND A LOT MORE THAN USUAL: 0
2. FEELING DOWN, DEPRESSED OR HOPELESS: 0
7. TROUBLE CONCENTRATING ON THINGS, SUCH AS READING THE NEWSPAPER OR WATCHING TELEVISION: 0
4. FEELING TIRED OR HAVING LITTLE ENERGY: 0
9. THOUGHTS THAT YOU WOULD BE BETTER OFF DEAD, OR OF HURTING YOURSELF: 1
3. TROUBLE FALLING OR STAYING ASLEEP: 3

## 2023-01-03 ASSESSMENT — COLUMBIA-SUICIDE SEVERITY RATING SCALE - C-SSRS
6. HAVE YOU EVER DONE ANYTHING, STARTED TO DO ANYTHING, OR PREPARED TO DO ANYTHING TO END YOUR LIFE?: NO
3. HAVE YOU BEEN THINKING ABOUT HOW YOU MIGHT KILL YOURSELF?: NO
1. WITHIN THE PAST MONTH, HAVE YOU WISHED YOU WERE DEAD OR WISHED YOU COULD GO TO SLEEP AND NOT WAKE UP?: NO
5. HAVE YOU STARTED TO WORK OUT OR WORKED OUT THE DETAILS OF HOW TO KILL YOURSELF? DO YOU INTEND TO CARRY OUT THIS PLAN?: NO
4. HAVE YOU HAD THESE THOUGHTS AND HAD SOME INTENTION OF ACTING ON THEM?: NO
2. HAVE YOU ACTUALLY HAD ANY THOUGHTS OF KILLING YOURSELF?: YES

## 2023-01-03 ASSESSMENT — ENCOUNTER SYMPTOMS: RESPIRATORY NEGATIVE: 1

## 2023-01-03 NOTE — PROGRESS NOTES
Subjective:      Patient ID: Fabio Guzman is a 69 y.o. male. HPI    Patient continues with urinary frequency. He is getting up at least twice nightly to urinate. Dysuria has resolved. Admits to drinking a lot of water but not late in the evening. Urinary stream is normal. PSA normal in July. Review of Systems   Constitutional: Negative. Respiratory: Negative. Cardiovascular: Negative. Genitourinary:  Positive for frequency. Negative for difficulty urinating, dysuria, flank pain and urgency. Objective:   Physical Exam  Constitutional:       Appearance: Normal appearance. Cardiovascular:      Rate and Rhythm: Normal rate and regular rhythm. Heart sounds: Normal heart sounds. Pulmonary:      Effort: Pulmonary effort is normal.      Breath sounds: Normal breath sounds. Neurological:      Mental Status: He is alert. Assessment / Plan:          Diagnosis Orders   1. Urinary frequency  POCT Urinalysis no Micro- treatment dependent on culture. With hematuria if negative will have see urology. Culture, Urine      2. Current severe episode of major depressive disorder without psychotic features without prior episode [facility])  Patient denies SI, states that has resolved with the medication which he feels has helped. Will schedule him to see  [facility]..

## 2023-01-05 LAB — URINE CULTURE, ROUTINE: NORMAL

## 2023-01-05 NOTE — RESULT ENCOUNTER NOTE
Patient was advised of results and voiced understanding. Patient will call [facility] for an appt, he says he already has their number.

## 2023-01-12 ENCOUNTER — TELEPHONE (OUTPATIENT)
Dept: FAMILY MEDICINE CLINIC | Age: 78
End: 2023-01-12

## 2023-01-12 NOTE — TELEPHONE ENCOUNTER
Patient was scheduled with Therese as a new patient on 1/30/23 but was advised of the cancellation due to her no longer being at [address] [facility].   He would like to know what Jori Marti recommends he do moving forward, should he try to see Braxton Newton first?

## 2023-01-13 NOTE — TELEPHONE ENCOUNTER
Patient was originally referred to [facility] by Efrain Virk but was scheduled as a new patient with Cierra Hardy would like to know if he can start with [facility] since his appt with Javier Mcclendon had to be cancelled due to her leaving [facility]. Please let us know.

## 2023-01-18 ENCOUNTER — TELEPHONE (OUTPATIENT)
Dept: INTERNAL MEDICINE CLINIC | Age: 78
End: 2023-01-18

## 2023-01-18 NOTE — TELEPHONE ENCOUNTER
Patient called ref missed virtual appointment for today (8:30). He was confused because he thought the appointment was immediately rescheduled thru MyChart, but turned out it was not. He would like to speak with you if you could squeeze him in or if you have a cancel today. Either way, would like a call back.     Baldemar Santos  [phone]

## 2023-02-08 ENCOUNTER — OFFICE VISIT (OUTPATIENT)
Dept: PSYCHOLOGY | Age: 78
End: 2023-02-08

## 2023-02-08 DIAGNOSIS — F41.9 ANXIETY: ICD-10-CM

## 2023-02-08 DIAGNOSIS — F33.42 MDD (MAJOR DEPRESSIVE DISORDER), RECURRENT, IN FULL REMISSION (HCC): Primary | ICD-10-CM

## 2023-02-08 ASSESSMENT — PATIENT HEALTH QUESTIONNAIRE - PHQ9
1. LITTLE INTEREST OR PLEASURE IN DOING THINGS: 0
SUM OF ALL RESPONSES TO PHQ QUESTIONS 1-9: 0
2. FEELING DOWN, DEPRESSED OR HOPELESS: 0
SUM OF ALL RESPONSES TO PHQ9 QUESTIONS 1 & 2: 0
SUM OF ALL RESPONSES TO PHQ QUESTIONS 1-9: 0

## 2023-02-08 NOTE — PATIENT INSTRUCTIONS
[address] [facility]. [phone]. -psychiatry referral Dr. Brenda Peres   Try the handout below on PMR  Return to see Gonzales  as needed      Progressive muscle relaxation (PMR) is an exercise that anyone can use to alleviate disturbing and disruptive emotional symptoms such as anxiety or insomnia. Like breathing exercises, visualization, and yoga, PMR is considered a relaxation technique. It's especially helpful in moments of high stress or nervousness, and even can help someone get through a panic attack. History of PMR  PMR was developed by an [place] physician, Viri Godwin, in the 1920s. Feng Vasquez noted that regardless of their illness, the majority of his patients suffered from muscle pain and tension. When he suggested that they relax, he noticed that most people didn't seem connected enough to their physical tension to release it. This inspired Feng Vasquez to develop a sequence of steps for tightening and then relaxing groups of muscles. He found this allowed his patients to become more aware of their tension, to learn how to let go of it, and to recognize what it feels like to be in a relaxed state. Since then the technique has been modified many times but all modern variations of PMR are based on Gutierrezs original idea of systematically squeezing and then releasing isolated muscle groups. Does It Work--and How? PMR works in part by helping to overcome a normal reaction to stress known as the flight-or-fight response. In evolutionary terms, this reaction developed as a way to help animals survive a threat--either by running away or by meeting the opposition head-on. Over time the flight-or-fight response has become a common reaction to feelings of fear that often are out of proportion with reality.       Unfortunately, when it's not needed for actual survival, the flight-or-fight reaction tends to bring on many uncomfortable physical symptoms, including accelerated heart rate, sweating, shaking, and shortness of breath--largely the product of an influx of stress hormones. Also, muscle pain, tension, and stiffness are common symptoms brought on by stress and anxiety. Relaxation techniques, including PMR, have the reverse effect on the body, eliciting the relaxation response, lowering heart rate, calming the mind, and reducing bodily tension. PMR also can help a person become more aware of how their physical stress may be contributing to their emotional state. By relaxing the body, a person may be able to let go of anxious thoughts and feelings. PMR Step-by-Step  For a quick taste of how PMR works, squeeze one of your fists as hard as you can. Notice how tight your fingers and forearm feel. Count to ten and then release the clinch. Allow your hand to relax completely and let go of any tension. Let your hand go limp and notice how relaxed it feels now compared to before your clinched your fist.       This methodical approach to increasing and releasing tension throughout your body is the linchpin of PMR: By systematically constricting and releasing various muscle groups it is possible to relieve physical stress and quiet and calm the mind. Here are the steps for one version of PMR that anyone can do. Try it next time you're feeling nervous, anxious, or find yourself tossing, turning, and unable to sleep. Step 1    Get comfortable. You don't have to lie down to do PMR; it will work if you're sitting up in a chair. Do make sure you're in a place that's free of distraction. Close your eyes if that feels best for you. Step 2    Breathe. Inhale deeply through your nose, feeling your abdomen rise as you fill your body with air. Then slowly exhale from your mouth, drawing your navel toward your spine. Repeat three to five times. Step 3    Starting with your feet, tighten and release your muscles. Clench your toes and pressing your heels toward the ground.  Squeeze tightly for a few breaths and then release. Now flex your feet in, pointing your toes up towards your head. Hold for a few seconds and then release. Step 4    Continue to work your way up your body, tightening and releasing each muscle group. Work your way up in this order: legs, glutes, abdomen, back, hands, arms, shoulders, neck, and face. Try to tighten each muscle group for a few breaths and then slowly release. Repeat any areas that feel especially stiff. Step 5     End the practice by taking a few more deep breaths, noting how much more calm and relaxed you feel. PMR is a skill, one that takes practice to master. In order to be able to draw on PMR when you need it--in other words, when you're truly in a stressful or anxiety-provoking situation--you'll want to learn how to do it while you aren't under pressure. Practice PMR several times a week to become aware of what it's like to feel relaxed. Understanding this feeling can help you to more readily let go of tension when anxiety rises.

## 2023-02-08 NOTE — Clinical Note
Sending to [facility], with Dr. Arthur Lind is a geriatric psychiatrist.  Looking for him to be followed by them, as it may get more complicated with his mood and Alzheimer's. He is stable now but could become labile.  I discussed the plan with his son Gabo Fregoso, who accompanied to him to the appointment

## 2023-02-08 NOTE — PROGRESS NOTES
Behavioral Health Consultation  Deneen Jonathan. Renita Rape  2/8/2023  5:38 PM      Time spent with Patient: 30 minutes  This is patient's first  [facility] appointment. Reason for Consult:  depression and anxiety  Referring Provider: JOSE D Castellano  [address],   [address]    Pt provided informed consent for the behavioral health program. Discussed with patient model of service to include the limits of confidentiality (i.e. abuse reporting, suicide intervention, etc.) and short-term intervention focused approach. Pt indicated understanding. Feedback given to PCP. S:  Patient was diagnosed with Alzheimer's disease about a year ago, currently being followed by [facility] neurology. A few months ago he endorsed struggling with suicidal thoughts. Thoughts of driving into traffic and then thought of taking life another way. Patient endorsed concerns about hurting another person, therefore decided not to drive his car into traffic. Following this incident patient's son, who is 48years old and lives with him took away his car and also took away all the firearms in the house to prevent any impulsive behavior. Patient reached out to PCP and started on Celexa. His mood is significantly improved, denied any thoughts of suicide since the event happened back in November. Patient Dors struggles with sleep, this has been an issue all of his life. Tends to go to bed about 930, will wake 4-5 times throughout the night and typically will rise about 4:30 AM.      Patient's youngest son is 50, lives in [place]. Pt has one grandson, age 11. Planning a play date on March 8th, which she is very much looking forward to. Patient does not see his youngest son is much, relationship is very good however timing is noise permit for it. .      Pt  x 2. Second marriage was 34 years before they .   Patient asked for divorce, after he retired financially felt she was spending way too much money, and could not keep up financially with her therefore asked for divorce. Patient and first wife  many years ago, she is the mother of his 2 sons      Pt has older brother, he is 78. They lost their oldest sister in  of brain tumor. Pt lost mother is , she  a few months after losing her daughter. Pts father  at the age of 61. Patient does not drink alcohol, no drug use. Patient not interested in therapy, would be okay with a referral to psychiatry. Patient feels anxiety is managed with the use of Celexa, however does indicate racing thoughts at night and interference with sleep. Denied any struggles with depression. O:  MSE:    Appearance: good hygiene   Attitude: cooperative and friendly  Consciousness: alert  Orientation: oriented to person, place, time, general circumstance  Memory:  remote intact, recent compromised  Attention/Concentration: lost train of thought while speaking  Psychomotor Activity:normal  Eye Contact: normal  Speech: normal rate and volume, well-articulated  Mood: euthmic  Affect: euthymic  Perception: within normal limits  Thought Content: within normal limits  Thought Process: logical, coherent and goal-directed  Insight: good  Judgment: intact  Ability to understand instructions: Yes  Ability to respond meaningfully: Yes  Morbid Ideation: no   Suicide Assessment: no suicidal ideation, plan, or intent  Homicidal Ideation: no    History:    Medications:   Current Outpatient Medications   Medication Sig Dispense Refill    citalopram (CELEXA) 10 MG tablet Take 1 tablet by mouth daily 30 tablet 3    atorvastatin (LIPITOR) 10 MG tablet Take 1 tablet by mouth daily 90 tablet 1    hydrocortisone (ANUSOL-HC) 2.5 % CREA rectal cream Apply to hemorrhoids bid 1 each 5     No current facility-administered medications for this visit.      Social History:   Social History     Socioeconomic History    Marital status:      Spouse name: Not on file Number of children: Not on file    Years of education: Not on file    Highest education level: Not on file   Occupational History    Not on file   Tobacco Use    Smoking status: Never    Smokeless tobacco: Never   Substance and Sexual Activity    Alcohol use: Yes     Alcohol/week: 0.0 - 2.0 standard drinks    Drug use: No    Sexual activity: Not on file   Other Topics Concern    Not on file   Social History Narrative    Not on file     Social Determinants of Health     Financial Resource Strain: Low Risk     Difficulty of Paying Living Expenses: Not hard at all   Food Insecurity: No Food Insecurity    Worried About Running Out of Food in the Last Year: Never true    Ran Out of Food in the Last Year: Never true   Transportation Needs: Not on file   Physical Activity: Insufficiently Active    Days of Exercise per Week: 7 days    Minutes of Exercise per Session: 20 min   Stress: Not on file   Social Connections: Not on file   Intimate Partner Violence: Not on file   Housing Stability: Not on file     TOBACCO:   reports that he has never smoked. He has never used smokeless tobacco.  ETOH:   reports current alcohol use. Family History:   Family History   Problem Relation Age of Onset    Heart Disease Mother     High Cholesterol Mother     Heart Disease Father     Heart Disease Maternal Grandmother     High Blood Pressure Brother      [facility] Scores 2/8/2023 1/3/2023 11/18/2022 7/20/2022 7/14/2022 7/8/2021 3/17/2021   PHQ2 Score 0 0 6 0 0 0 0   PHQ9 Score 0 7 18 0 0 0 0     Interpretation of Total Score Depression Severity: 1-4 = Minimal depression, 5-9 = Mild depression, 10-14 = Moderate depression, 15-19 = Moderately severe depression, 20-27 = Severe depression      A:  Patient endorses back in November thoughts of suicide, and contemplated driving his car into traffic. Following this event, patient reach out to his son who has been very active with his mental health and medical treatment.   Patient not interested in mental health treatment at this time, but is open to a referral for psychiatric care. Referral to neuropsych center [facility], Dr. Nunu Melendez who is a geriatric psychiatrist, to manage his medications. No SI/HI, insight and motivation good. Diagnosis:    1. MDD (major depressive disorder), recurrent, in full remission ([facility] [address]    2.  Anxiety          Diagnosis Date    Elevated blood pressure reading without diagnosis of hypertension      Plan:  Pt interventions:  Provided handout on  anxiety, Provided education on the use of medication to treat  depression and anxiety, Trained in relaxation strategies, Provided education, Discussed self-care (sleep, nutrition, rewarding activities, social support, exercise), Discussed benefits of referral for specialty care, Established rapport, and Supportive techniques    Pt Behavioral Change Plan:   See Pt Instructions

## 2023-03-27 RX ORDER — CITALOPRAM 10 MG/1
10 TABLET ORAL DAILY
Qty: 30 TABLET | Refills: 3 | Status: SHIPPED | OUTPATIENT
Start: 2023-03-27

## 2023-03-27 RX ORDER — HYDROCORTISONE 25 MG/G
CREAM TOPICAL
Qty: 1 EACH | Refills: 5 | Status: SHIPPED | OUTPATIENT
Start: 2023-03-27

## 2023-03-27 NOTE — TELEPHONE ENCOUNTER
Maru Chloride [phone] (home)    is requesting refill(s) of medication hydrocortisone and citalopram to preferred pharmacy [facility]    Last OV 12/09/22 for citalopram and 10/31/22 for hydrocortisone (pertaining to medication)   Last refill 10/31/22 for hydrocortisone and 11/18/22 for citalopram (per medication requested)  Next office visit scheduled or attempted Yes  Date 03/31/2023

## 2023-04-17 ENCOUNTER — OFFICE VISIT (OUTPATIENT)
Dept: FAMILY MEDICINE CLINIC | Age: 78
End: 2023-04-17
Payer: MEDICARE

## 2023-04-17 VITALS
DIASTOLIC BLOOD PRESSURE: 80 MMHG | BODY MASS INDEX: 26.03 KG/M2 | HEART RATE: 91 BPM | WEIGHT: 181.8 LBS | SYSTOLIC BLOOD PRESSURE: 142 MMHG | HEIGHT: 70 IN | OXYGEN SATURATION: 96 %

## 2023-04-17 DIAGNOSIS — F32.2 CURRENT SEVERE EPISODE OF MAJOR DEPRESSIVE DISORDER WITHOUT PSYCHOTIC FEATURES WITHOUT PRIOR EPISODE (HCC): Primary | ICD-10-CM

## 2023-04-17 PROCEDURE — 1123F ACP DISCUSS/DSCN MKR DOCD: CPT | Performed by: PHYSICIAN ASSISTANT

## 2023-04-17 PROCEDURE — G8417 CALC BMI ABV UP PARAM F/U: HCPCS | Performed by: PHYSICIAN ASSISTANT

## 2023-04-17 PROCEDURE — G8427 DOCREV CUR MEDS BY ELIG CLIN: HCPCS | Performed by: PHYSICIAN ASSISTANT

## 2023-04-17 PROCEDURE — 1036F TOBACCO NON-USER: CPT | Performed by: PHYSICIAN ASSISTANT

## 2023-04-17 PROCEDURE — 99213 OFFICE O/P EST LOW 20 MIN: CPT | Performed by: PHYSICIAN ASSISTANT

## 2023-04-17 NOTE — PROGRESS NOTES
Subjective:       Christopher Wang is a 69 y.o. male who presents for follow up of depression. Onset was approximately several months ago. Symptoms have been gradually worsening since that time. Current symptoms include:  none . Patient denies anhedonia, depressed mood, and insomnia. He complains of the following side effects from the treatment: none. Patient's medications, allergies, past medical, surgical, social and family histories were reviewed and updated as appropriate. Review of Systems  A comprehensive review of systems was negative. Objective:      BP (!) 142/80   Pulse 91   Ht 5' 10\" (1.778 m)   Wt 181 lb 12.8 oz (82.5 kg)   SpO2 96%   BMI 26.09 kg/m²    General:  alert, appears stated age, and cooperative   Affect & Behavior:  good grooming  none    Memory deficit        Assessment:      Diagnosis Orders   1. Current severe episode of major depressive disorder without psychotic features without prior episode [facility])               Plan:        Doing well on celexa 10mg will continue. [facility] and dx with alzheimers, is no longer driving.  His son is present, currently keeping active and engaged

## 2023-06-28 RX ORDER — CITALOPRAM HYDROBROMIDE 10 MG/1
10 TABLET ORAL DAILY
Qty: 30 TABLET | Refills: 5 | Status: SHIPPED | OUTPATIENT
Start: 2023-06-28

## 2023-06-28 NOTE — TELEPHONE ENCOUNTER
Bailey Ramos [phone] (home)    is requesting refill(s) of medication Citalopram to preferred pharmacy [facility]    Last OV 4/17/23 (pertaining to medication)   Last refill 3/27/23 (per medication requested)  Next office visit scheduled or attempted Yes  Date 10/17/23  If No, reason

## 2023-07-25 RX ORDER — ATORVASTATIN CALCIUM 10 MG/1
10 TABLET, FILM COATED ORAL DAILY
Qty: 90 TABLET | Refills: 1 | Status: SHIPPED | OUTPATIENT
Start: 2023-07-25

## 2023-07-25 NOTE — TELEPHONE ENCOUNTER
Ace [facility] [phone] (home)    is requesting refill(s) of medication Atorvastatin 10 mg tablet to preferred pharmacy [address] [place] [address] 04/17/23 (pertaining to medication)   Last refill 10/31/22 (per medication requested)  Next office visit scheduled or attempted Yes  Date 10/17/23

## 2023-09-28 ENCOUNTER — TELEPHONE (OUTPATIENT)
Dept: FAMILY MEDICINE CLINIC | Age: 78
End: 2023-09-28

## 2023-10-17 ENCOUNTER — OFFICE VISIT (OUTPATIENT)
Dept: FAMILY MEDICINE CLINIC | Age: 78
End: 2023-10-17

## 2023-10-17 VITALS
BODY MASS INDEX: 25.25 KG/M2 | HEIGHT: 70 IN | RESPIRATION RATE: 16 BRPM | OXYGEN SATURATION: 96 % | WEIGHT: 176.4 LBS | DIASTOLIC BLOOD PRESSURE: 76 MMHG | HEART RATE: 73 BPM | SYSTOLIC BLOOD PRESSURE: 124 MMHG

## 2023-10-17 DIAGNOSIS — R35.0 URINARY FREQUENCY: Primary | ICD-10-CM

## 2023-10-17 DIAGNOSIS — F33.1 MODERATE EPISODE OF RECURRENT MAJOR DEPRESSIVE DISORDER (HCC): ICD-10-CM

## 2023-10-17 PROBLEM — E78.5 DYSLIPIDEMIA: Status: RESOLVED | Noted: 2018-08-08 | Resolved: 2023-10-17

## 2023-10-17 PROBLEM — E78.2 MIXED HYPERLIPIDEMIA: Status: ACTIVE | Noted: 2023-10-17

## 2023-10-17 RX ORDER — TAMSULOSIN HYDROCHLORIDE 0.4 MG/1
0.4 CAPSULE ORAL DAILY
Qty: 90 CAPSULE | Refills: 0 | Status: SHIPPED | OUTPATIENT
Start: 2023-10-17

## 2023-10-17 RX ORDER — HYDROCORTISONE 25 MG/G
CREAM TOPICAL
Qty: 1 EACH | Refills: 5 | Status: SHIPPED | OUTPATIENT
Start: 2023-10-17

## 2023-10-17 SDOH — ECONOMIC STABILITY: FOOD INSECURITY: WITHIN THE PAST 12 MONTHS, THE FOOD YOU BOUGHT JUST DIDN'T LAST AND YOU DIDN'T HAVE MONEY TO GET MORE.: PATIENT DECLINED

## 2023-10-17 SDOH — ECONOMIC STABILITY: HOUSING INSECURITY
IN THE LAST 12 MONTHS, WAS THERE A TIME WHEN YOU DID NOT HAVE A STEADY PLACE TO SLEEP OR SLEPT IN A SHELTER (INCLUDING NOW)?: PATIENT REFUSED

## 2023-10-17 SDOH — ECONOMIC STABILITY: INCOME INSECURITY: HOW HARD IS IT FOR YOU TO PAY FOR THE VERY BASICS LIKE FOOD, HOUSING, MEDICAL CARE, AND HEATING?: PATIENT DECLINED

## 2023-10-17 SDOH — ECONOMIC STABILITY: FOOD INSECURITY: WITHIN THE PAST 12 MONTHS, YOU WORRIED THAT YOUR FOOD WOULD RUN OUT BEFORE YOU GOT MONEY TO BUY MORE.: PATIENT DECLINED

## 2023-10-17 ASSESSMENT — ENCOUNTER SYMPTOMS: RESPIRATORY NEGATIVE: 1

## 2023-10-17 NOTE — PROGRESS NOTES
Subjective:      Patient ID: Jacky Brice is a 69 y.o. male. Patient here today for follow up depression. He is overall doing well. Presents with son. Has moved to independent living facility, it working on being more active socially there. He is having urinary frequency and nocturia. Is followed by Dr. Sheryle Proud with geriatric care from [facility]. Significant memory issues. Review of Systems   Constitutional: Negative. Respiratory: Negative. Cardiovascular: Negative. Genitourinary:  Positive for frequency. Nocturia         Objective:   Physical Exam  Constitutional:       Appearance: Normal appearance. Cardiovascular:      Rate and Rhythm: Normal rate and regular rhythm. Heart sounds: Normal heart sounds. Pulmonary:      Effort: Pulmonary effort is normal.      Breath sounds: Normal breath sounds. Neurological:      General: No focal deficit present. Mental Status: He is alert. Mental status is at baseline. Assessment / Plan:          Diagnosis Orders   1. Urinary frequency  Will start flomax      2.  Moderate episode of recurrent major depressive disorder (HCC)  Well controlled with celexa

## 2024-01-12 RX ORDER — CITALOPRAM HYDROBROMIDE 10 MG/1
10 TABLET ORAL DAILY
Qty: 30 TABLET | Refills: 5 | Status: SHIPPED | OUTPATIENT
Start: 2024-01-12

## 2024-01-12 NOTE — TELEPHONE ENCOUNTER
Wes Yanez [phone] (home)    is requesting refill(s) of medication Citalopram 10 mg Tablet to preferred pharmacy [facility]    Last OV 10/17/23 (pertaining to medication)   Last refill 06/28/23 (per medication requested)  Next office visit scheduled or attempted No

## 2024-01-15 RX ORDER — TAMSULOSIN HYDROCHLORIDE 0.4 MG/1
0.4 CAPSULE ORAL DAILY
Qty: 90 CAPSULE | Refills: 0 | Status: SHIPPED | OUTPATIENT
Start: 2024-01-15

## 2024-01-15 RX ORDER — ATORVASTATIN CALCIUM 10 MG/1
10 TABLET, FILM COATED ORAL DAILY
Qty: 90 TABLET | Refills: 1 | Status: SHIPPED | OUTPATIENT
Start: 2024-01-15

## 2024-01-15 NOTE — TELEPHONE ENCOUNTER
Wes Yanez [phone] (home)    is requesting refill(s) of medication Tamsulosin 0.4 mg capsule and Atorvastatin 10 mg Tablet to preferred pharmacy [facility]    Last OV 10/17/23 (pertaining to medication)   Last refill 07/25/23 for Atorvastatin and 10/17/23 for Tamsulosin (per medication requested)  Next office visit scheduled or attempted No     No previous uterine incision/Carpio Pregnancy/Less than or equal to 4 previous vaginal births/No known bleeding disorder/No history of postpartum hemorrhage

## 2024-04-03 NOTE — TELEPHONE ENCOUNTER
Wes Yanez is requesting refill(s) flomax  Last OV 10/17/23 (pertaining to medication)  LR 1/15/24 (per medication requested)  Next office visit scheduled or attempted Yes   If no, reason:  4/16/24

## 2024-04-04 RX ORDER — TAMSULOSIN HYDROCHLORIDE 0.4 MG/1
0.4 CAPSULE ORAL DAILY
Qty: 90 CAPSULE | Refills: 1 | Status: SHIPPED | OUTPATIENT
Start: 2024-04-04

## 2024-04-16 ENCOUNTER — TELEMEDICINE (OUTPATIENT)
Dept: FAMILY MEDICINE CLINIC | Age: 79
End: 2024-04-16
Payer: MEDICARE

## 2024-04-16 DIAGNOSIS — Z00.00 MEDICARE ANNUAL WELLNESS VISIT, SUBSEQUENT: Primary | ICD-10-CM

## 2024-04-16 PROCEDURE — 1123F ACP DISCUSS/DSCN MKR DOCD: CPT | Performed by: PHYSICIAN ASSISTANT

## 2024-04-16 PROCEDURE — G0439 PPPS, SUBSEQ VISIT: HCPCS | Performed by: PHYSICIAN ASSISTANT

## 2024-04-16 SDOH — ECONOMIC STABILITY: FOOD INSECURITY: WITHIN THE PAST 12 MONTHS, THE FOOD YOU BOUGHT JUST DIDN'T LAST AND YOU DIDN'T HAVE MONEY TO GET MORE.: NEVER TRUE

## 2024-04-16 SDOH — ECONOMIC STABILITY: HOUSING INSECURITY
IN THE LAST 12 MONTHS, WAS THERE A TIME WHEN YOU DID NOT HAVE A STEADY PLACE TO SLEEP OR SLEPT IN A SHELTER (INCLUDING NOW)?: NO

## 2024-04-16 SDOH — ECONOMIC STABILITY: FOOD INSECURITY: WITHIN THE PAST 12 MONTHS, YOU WORRIED THAT YOUR FOOD WOULD RUN OUT BEFORE YOU GOT MONEY TO BUY MORE.: NEVER TRUE

## 2024-04-16 SDOH — ECONOMIC STABILITY: INCOME INSECURITY: HOW HARD IS IT FOR YOU TO PAY FOR THE VERY BASICS LIKE FOOD, HOUSING, MEDICAL CARE, AND HEATING?: NOT HARD AT ALL

## 2024-04-16 ASSESSMENT — PATIENT HEALTH QUESTIONNAIRE - PHQ9
SUM OF ALL RESPONSES TO PHQ9 QUESTIONS 1 & 2: 0
7. TROUBLE CONCENTRATING ON THINGS, SUCH AS READING THE NEWSPAPER OR WATCHING TELEVISION: NOT AT ALL
SUM OF ALL RESPONSES TO PHQ QUESTIONS 1-9: 0
2. FEELING DOWN, DEPRESSED OR HOPELESS: NOT AT ALL
5. POOR APPETITE OR OVEREATING: NOT AT ALL
1. LITTLE INTEREST OR PLEASURE IN DOING THINGS: NOT AT ALL
SUM OF ALL RESPONSES TO PHQ QUESTIONS 1-9: 0
3. TROUBLE FALLING OR STAYING ASLEEP: NOT AT ALL
8. MOVING OR SPEAKING SO SLOWLY THAT OTHER PEOPLE COULD HAVE NOTICED. OR THE OPPOSITE, BEING SO FIGETY OR RESTLESS THAT YOU HAVE BEEN MOVING AROUND A LOT MORE THAN USUAL: NOT AT ALL
9. THOUGHTS THAT YOU WOULD BE BETTER OFF DEAD, OR OF HURTING YOURSELF: NOT AT ALL
10. IF YOU CHECKED OFF ANY PROBLEMS, HOW DIFFICULT HAVE THESE PROBLEMS MADE IT FOR YOU TO DO YOUR WORK, TAKE CARE OF THINGS AT HOME, OR GET ALONG WITH OTHER PEOPLE: NOT DIFFICULT AT ALL
4. FEELING TIRED OR HAVING LITTLE ENERGY: NOT AT ALL
6. FEELING BAD ABOUT YOURSELF - OR THAT YOU ARE A FAILURE OR HAVE LET YOURSELF OR YOUR FAMILY DOWN: NOT AT ALL
SUM OF ALL RESPONSES TO PHQ QUESTIONS 1-9: 0
SUM OF ALL RESPONSES TO PHQ QUESTIONS 1-9: 0

## 2024-04-16 ASSESSMENT — LIFESTYLE VARIABLES
HOW MANY STANDARD DRINKS CONTAINING ALCOHOL DO YOU HAVE ON A TYPICAL DAY: PATIENT DOES NOT DRINK
HOW OFTEN DO YOU HAVE A DRINK CONTAINING ALCOHOL: NEVER

## 2024-04-16 NOTE — PROGRESS NOTES
Medicare Annual Wellness Visit    Wes Yanez is here for Medicare AWV    Assessment & Plan   Medicare annual wellness visit, subsequent  Recommendations for Preventive Services Due: see orders and patient instructions/AVS.  Recommended screening schedule for the next 5-10 years is provided to the patient in written form: see Patient Instructions/AVS.     No follow-ups on file.     Subjective       Patient's complete Health Risk Assessment and screening values have been reviewed and are found in Flowsheets. The following problems were reviewed today and where indicated follow up appointments were made and/or referrals ordered.    Positive Risk Factor Screenings with Interventions:       Cognitive:   Clock Drawing Test (CDT): (!) Abnormal  Words recalled: 1 Word Recalled  Total Score: (!) 1  Total Score Interpretation: Abnormal Mini-Cog  Interventions:  Patient with know dementia                                Objective      Patient-Reported Vitals  No data recorded            Allergies   Allergen Reactions    Tramadol Hcl (Er Biphasic) Other (See Comments)     Causes more pain that he had before.      Prior to Visit Medications    Medication Sig Taking? Authorizing Provider   tamsulosin (FLOMAX) 0.4 MG capsule Take 1 capsule by mouth daily Yes Michelle Lutz PA   atorvastatin (LIPITOR) 10 MG tablet TAKE 1 TABLET BY MOUTH DAILY. Yes Michelle Lutz PA   citalopram (CELEXA) 10 MG tablet TAKE 1 TABLET BY MOUTH DAILY Yes Michelle Lutz PA   hydrocortisone (ANUSOL-HC) 2.5 % CREA rectal cream Apply to hemorrhoids bid Yes Michelle Lutz PA       CareTeam (Including outside providers/suppliers regularly involved in providing care):   Patient Care Team:  Michelle Lutz PA as PCP - General (Physician Assistant)  Michelle Lutz PA as PCP - Empaneled Provider  Breanne Kelley MD (Gastroenterology)  Pranay Dumont MD (Internal Medicine)  Jackie Saldaña APRN - NP (Nurse Practitioner)     Reviewed and updated this

## 2024-04-23 ENCOUNTER — TELEPHONE (OUTPATIENT)
Dept: FAMILY MEDICINE CLINIC | Age: 79
End: 2024-04-23

## 2024-04-23 NOTE — TELEPHONE ENCOUNTER
Alicia with [facility] called because patient's son Marko is trying to set up home health care for his father and they are requesting orders from Michelle to refer him and evaluate for home health care.

## 2024-04-24 ENCOUNTER — OFFICE VISIT (OUTPATIENT)
Dept: FAMILY MEDICINE CLINIC | Age: 79
End: 2024-04-24
Payer: MEDICARE

## 2024-04-24 VITALS
OXYGEN SATURATION: 97 % | HEIGHT: 70 IN | SYSTOLIC BLOOD PRESSURE: 138 MMHG | RESPIRATION RATE: 16 BRPM | BODY MASS INDEX: 25.65 KG/M2 | TEMPERATURE: 97.9 F | WEIGHT: 179.2 LBS | HEART RATE: 61 BPM | DIASTOLIC BLOOD PRESSURE: 78 MMHG

## 2024-04-24 DIAGNOSIS — G30.9 ALZHEIMER DISEASE (HCC): Primary | ICD-10-CM

## 2024-04-24 DIAGNOSIS — F02.80 ALZHEIMER DISEASE (HCC): Primary | ICD-10-CM

## 2024-04-24 DIAGNOSIS — R29.898 WEAKNESS OF BOTH LOWER EXTREMITIES: ICD-10-CM

## 2024-04-24 PROCEDURE — 1123F ACP DISCUSS/DSCN MKR DOCD: CPT | Performed by: PHYSICIAN ASSISTANT

## 2024-04-24 PROCEDURE — G8427 DOCREV CUR MEDS BY ELIG CLIN: HCPCS | Performed by: PHYSICIAN ASSISTANT

## 2024-04-24 PROCEDURE — G8417 CALC BMI ABV UP PARAM F/U: HCPCS | Performed by: PHYSICIAN ASSISTANT

## 2024-04-24 PROCEDURE — 1036F TOBACCO NON-USER: CPT | Performed by: PHYSICIAN ASSISTANT

## 2024-04-24 PROCEDURE — 99214 OFFICE O/P EST MOD 30 MIN: CPT | Performed by: PHYSICIAN ASSISTANT

## 2024-04-24 NOTE — TELEPHONE ENCOUNTER
Alicia was advised and left message for patient's son Marko to also advise, does he want to reschedule with Michelle so she can order home care needs?

## 2024-04-24 NOTE — TELEPHONE ENCOUNTER
Patient's son was advised will need to see Michelle for a face to face before she can order any home health care which pt is due for since he last saw her October of 2023.  He says he would like to keep today's acute appt with Caitlin to address patient's current issue with pain and will schedule his 6 month follow up with Michelle while in the office so other issues can be addressed as well.

## 2024-04-24 NOTE — PROGRESS NOTES
DAILY 30 tablet 5    hydrocortisone (ANUSOL-HC) 2.5 % CREA rectal cream Apply to hemorrhoids bid 1 each 5     No current facility-administered medications on file prior to visit.        PHYSICAL EXAM     Vitals:    04/24/24 1534   BP: 138/78   Pulse: 61   Resp: 16   Temp: 97.9 °F (36.6 °C)   TempSrc: Temporal   SpO2: 97%   Weight: 81.3 kg (179 lb 3.2 oz)   Height: 1.778 m (5' 10\")   Body mass index is 25.71 kg/m².  APPEARANCE: Well nourished. No distress.   HEENT: Head: Normocephalic, atraumatic.  EOMI,PERRLA. Conjunctiva pink and moist. Sclera white. Hearing intact. Nares patent. Oral mucosa moist. Throat clear.  NECK: No lymphadenopathy or masses. Thyroid is smooth. Moves neck fully.  HEART: Reg rate and rhythm. No murmurs, rubs, or gallops.   LUNGS: Clear to auscultation. No wheezes, rales, or rhonchi.  ABDOMEN:  Soft, bowel sounds present, non-tender, no masses or organomegaly.  MUSCULOSKELETAL:  No clubbing, cyanosis or edema.  Moves all joints without eliciting pain.  Equal strength upper and lower extremities.  Moves all joints without eliciting pain.  No skin or bony deformities other than arthritic changes over the knuckles.  NEUROLOGIC: Smiles.  Understands and answers questions but defers to son often.  SKIN: Warm, dry, normal turgor. Cap refill <3secs.  No rashes, petechiae, purpura.         ADDITIONAL STUDIES     Pertinent prior laboratory results and/or imaging reviewed.   No orders of the defined types were placed in this encounter.      Electronically signed by JOSE D Elias on 4/24/2024 at 4:06 PM

## 2024-05-17 ENCOUNTER — OFFICE VISIT (OUTPATIENT)
Dept: FAMILY MEDICINE CLINIC | Age: 79
End: 2024-05-17
Payer: MEDICARE

## 2024-05-17 VITALS
WEIGHT: 176.8 LBS | OXYGEN SATURATION: 95 % | HEART RATE: 71 BPM | BODY MASS INDEX: 25.31 KG/M2 | SYSTOLIC BLOOD PRESSURE: 124 MMHG | DIASTOLIC BLOOD PRESSURE: 76 MMHG | RESPIRATION RATE: 16 BRPM | HEIGHT: 70 IN

## 2024-05-17 DIAGNOSIS — G30.9 ALZHEIMER DISEASE (HCC): Primary | ICD-10-CM

## 2024-05-17 DIAGNOSIS — F32.A ANXIETY AND DEPRESSION: ICD-10-CM

## 2024-05-17 DIAGNOSIS — F02.80 ALZHEIMER DISEASE (HCC): Primary | ICD-10-CM

## 2024-05-17 DIAGNOSIS — F41.9 ANXIETY AND DEPRESSION: ICD-10-CM

## 2024-05-17 PROCEDURE — 99214 OFFICE O/P EST MOD 30 MIN: CPT | Performed by: PHYSICIAN ASSISTANT

## 2024-05-17 PROCEDURE — 1036F TOBACCO NON-USER: CPT | Performed by: PHYSICIAN ASSISTANT

## 2024-05-17 PROCEDURE — G8427 DOCREV CUR MEDS BY ELIG CLIN: HCPCS | Performed by: PHYSICIAN ASSISTANT

## 2024-05-17 PROCEDURE — 1123F ACP DISCUSS/DSCN MKR DOCD: CPT | Performed by: PHYSICIAN ASSISTANT

## 2024-05-17 PROCEDURE — G8417 CALC BMI ABV UP PARAM F/U: HCPCS | Performed by: PHYSICIAN ASSISTANT

## 2024-05-17 ASSESSMENT — PATIENT HEALTH QUESTIONNAIRE - PHQ9
6. FEELING BAD ABOUT YOURSELF - OR THAT YOU ARE A FAILURE OR HAVE LET YOURSELF OR YOUR FAMILY DOWN: NOT AT ALL
SUM OF ALL RESPONSES TO PHQ QUESTIONS 1-9: 0
10. IF YOU CHECKED OFF ANY PROBLEMS, HOW DIFFICULT HAVE THESE PROBLEMS MADE IT FOR YOU TO DO YOUR WORK, TAKE CARE OF THINGS AT HOME, OR GET ALONG WITH OTHER PEOPLE: NOT DIFFICULT AT ALL
8. MOVING OR SPEAKING SO SLOWLY THAT OTHER PEOPLE COULD HAVE NOTICED. OR THE OPPOSITE, BEING SO FIGETY OR RESTLESS THAT YOU HAVE BEEN MOVING AROUND A LOT MORE THAN USUAL: NOT AT ALL
SUM OF ALL RESPONSES TO PHQ9 QUESTIONS 1 & 2: 0
5. POOR APPETITE OR OVEREATING: NOT AT ALL
SUM OF ALL RESPONSES TO PHQ QUESTIONS 1-9: 0
9. THOUGHTS THAT YOU WOULD BE BETTER OFF DEAD, OR OF HURTING YOURSELF: NOT AT ALL
3. TROUBLE FALLING OR STAYING ASLEEP: NOT AT ALL
1. LITTLE INTEREST OR PLEASURE IN DOING THINGS: NOT AT ALL
7. TROUBLE CONCENTRATING ON THINGS, SUCH AS READING THE NEWSPAPER OR WATCHING TELEVISION: NOT AT ALL
2. FEELING DOWN, DEPRESSED OR HOPELESS: NOT AT ALL

## 2024-05-17 ASSESSMENT — ENCOUNTER SYMPTOMS: RESPIRATORY NEGATIVE: 1

## 2024-05-17 NOTE — PROGRESS NOTES
Subjective   Patient ID: Wes Yanez is a 78 y.o. male.    HPI    Patient presents today for follow up of of dementia and depression. Is currently in Independent living at [place]. Patient presents with son, is having issues with medications- keeping them straight. Some issues with keeping things clean and with laundry, wearing the same clothes, ADL issues    Has noted left hand swelling and discomfort for the last 2 weeks, no injury. Will take aleve and that helps.     Review of Systems   Constitutional: Negative.    Respiratory: Negative.     Cardiovascular: Negative.    Musculoskeletal:         Left hand edema and discomfort          Objective   Physical Exam  Constitutional:       Appearance: Normal appearance.   Cardiovascular:      Rate and Rhythm: Normal rate and regular rhythm.      Heart sounds: Normal heart sounds.   Pulmonary:      Effort: Pulmonary effort is normal.      Breath sounds: Normal breath sounds.   Musculoskeletal:      Comments: Left hand with mild edema, pain with making a fist, no specific joint pain to palpate   Neurological:      Mental Status: He is alert. Mental status is at baseline.   Psychiatric:         Mood and Affect: Mood normal.            Assessment /Plan:         Diagnosis Orders   1. Alzheimer disease (HCC)  External Referral To Home Health- to help with medication management and ADL's      2. Anxiety and depression  Stable on celexa          JOSE D Wells

## 2024-05-20 ENCOUNTER — TELEPHONE (OUTPATIENT)
Dept: FAMILY MEDICINE CLINIC | Age: 79
End: 2024-05-20

## 2024-05-20 NOTE — TELEPHONE ENCOUNTER
Nanci with [facility] called to let Michelle know they evaluated patient and have approved him for once a week visits for 9 weeks for skilled nursing for med management and disease management.  They are asking if she would agree and sign for speech therapy orders due to alzheimer's diagnosis

## 2024-05-29 ENCOUNTER — TELEPHONE (OUTPATIENT)
Dept: FAMILY MEDICINE CLINIC | Age: 79
End: 2024-05-29

## 2024-05-29 NOTE — TELEPHONE ENCOUNTER
Nanci called with [facility] to see if Michelle would give verbal ok to add a [place] home health aide to assist patient with showering.  She can be reached at [phone]

## 2024-07-03 NOTE — TELEPHONE ENCOUNTER
Wes Yanez is requesting refill(s) tamsulosin  Last OV 5/17/24 (pertaining to medication)  LR 4/4/24 (per medication requested)  Next office visit scheduled or attempted No   If no, reason:  pt is due in November      Wes Yanez is requesting refill(s) citalopram  Last OV 5/17/24 (pertaining to medication)  LR 1/12/24 (per medication requested)  Next office visit scheduled or attempted No   If no, reason:

## 2024-07-05 RX ORDER — TAMSULOSIN HYDROCHLORIDE 0.4 MG/1
0.4 CAPSULE ORAL DAILY
Qty: 90 CAPSULE | Refills: 1 | Status: SHIPPED | OUTPATIENT
Start: 2024-07-05

## 2024-07-05 RX ORDER — CITALOPRAM HYDROBROMIDE 10 MG/1
10 TABLET ORAL DAILY
Qty: 90 TABLET | Refills: 1 | Status: SHIPPED | OUTPATIENT
Start: 2024-07-05

## 2024-07-15 ENCOUNTER — APPOINTMENT (OUTPATIENT)
Dept: CT IMAGING | Age: 79
End: 2024-07-15
Payer: MEDICARE

## 2024-07-15 ENCOUNTER — APPOINTMENT (OUTPATIENT)
Dept: GENERAL RADIOLOGY | Age: 79
End: 2024-07-15
Payer: MEDICARE

## 2024-07-15 ENCOUNTER — OFFICE VISIT (OUTPATIENT)
Dept: FAMILY MEDICINE CLINIC | Age: 79
End: 2024-07-15
Payer: MEDICARE

## 2024-07-15 ENCOUNTER — HOSPITAL ENCOUNTER (INPATIENT)
Age: 79
LOS: 8 days | Discharge: NURSING, REHAB OR LONG-TERM CARE | End: 2024-07-23
Attending: STUDENT IN AN ORGANIZED HEALTH CARE EDUCATION/TRAINING PROGRAM | Admitting: STUDENT IN AN ORGANIZED HEALTH CARE EDUCATION/TRAINING PROGRAM
Payer: MEDICARE

## 2024-07-15 VITALS
BODY MASS INDEX: 25.37 KG/M2 | HEART RATE: 75 BPM | OXYGEN SATURATION: 97 % | DIASTOLIC BLOOD PRESSURE: 70 MMHG | HEIGHT: 70 IN | SYSTOLIC BLOOD PRESSURE: 124 MMHG | RESPIRATION RATE: 16 BRPM

## 2024-07-15 DIAGNOSIS — R29.90 STROKE-LIKE SYMPTOMS: ICD-10-CM

## 2024-07-15 DIAGNOSIS — R62.7 FAILURE TO THRIVE IN ADULT: ICD-10-CM

## 2024-07-15 DIAGNOSIS — R35.0 URINE FREQUENCY: Primary | ICD-10-CM

## 2024-07-15 DIAGNOSIS — R53.1 GENERALIZED WEAKNESS: ICD-10-CM

## 2024-07-15 DIAGNOSIS — R13.10 DYSPHAGIA, UNSPECIFIED TYPE: ICD-10-CM

## 2024-07-15 DIAGNOSIS — R53.1 WEAKNESS: ICD-10-CM

## 2024-07-15 DIAGNOSIS — I26.99 ACUTE PULMONARY EMBOLISM WITHOUT ACUTE COR PULMONALE, UNSPECIFIED PULMONARY EMBOLISM TYPE (HCC): Primary | ICD-10-CM

## 2024-07-15 LAB
ALBUMIN SERPL-MCNC: 3.7 G/DL (ref 3.4–5)
ALBUMIN/GLOB SERPL: 0.8 {RATIO} (ref 1.1–2.2)
ALP SERPL-CCNC: 100 U/L (ref 40–129)
ALT SERPL-CCNC: 12 U/L (ref 10–40)
ANION GAP SERPL CALCULATED.3IONS-SCNC: 9 MMOL/L (ref 3–16)
AST SERPL-CCNC: 17 U/L (ref 15–37)
BASOPHILS # BLD: 0.1 K/UL (ref 0–0.2)
BASOPHILS NFR BLD: 0.4 %
BILIRUB SERPL-MCNC: 1 MG/DL (ref 0–1)
BILIRUBIN, POC: NORMAL
BLOOD URINE, POC: NORMAL
BUN SERPL-MCNC: 23 MG/DL (ref 7–20)
CALCIUM SERPL-MCNC: 9.2 MG/DL (ref 8.3–10.6)
CHLORIDE SERPL-SCNC: 98 MMOL/L (ref 99–110)
CK SERPL-CCNC: 181 U/L (ref 39–308)
CLARITY, POC: NORMAL
CO2 SERPL-SCNC: 25 MMOL/L (ref 21–32)
COLOR, POC: NORMAL
CREAT SERPL-MCNC: 0.7 MG/DL (ref 0.8–1.3)
DEPRECATED RDW RBC AUTO: 14.4 % (ref 12.4–15.4)
EOSINOPHIL # BLD: 0 K/UL (ref 0–0.6)
EOSINOPHIL NFR BLD: 0.1 %
FLUAV RNA RESP QL NAA+PROBE: NOT DETECTED
FLUBV RNA RESP QL NAA+PROBE: NOT DETECTED
GFR SERPLBLD CREATININE-BSD FMLA CKD-EPI: >90 ML/MIN/{1.73_M2}
GLUCOSE SERPL-MCNC: 112 MG/DL (ref 70–99)
GLUCOSE URINE, POC: NEGATIVE
HCT VFR BLD AUTO: 41.7 % (ref 40.5–52.5)
HGB BLD-MCNC: 13.8 G/DL (ref 13.5–17.5)
KETONES, POC: NORMAL
LACTATE BLDV-SCNC: 1.4 MMOL/L (ref 0.4–2)
LEUKOCYTE EST, POC: NEGATIVE
LYMPHOCYTES # BLD: 0.8 K/UL (ref 1–5.1)
LYMPHOCYTES NFR BLD: 5.8 %
MCH RBC QN AUTO: 29.5 PG (ref 26–34)
MCHC RBC AUTO-ENTMCNC: 33.2 G/DL (ref 31–36)
MCV RBC AUTO: 89.1 FL (ref 80–100)
MONOCYTES # BLD: 1.1 K/UL (ref 0–1.3)
MONOCYTES NFR BLD: 8.1 %
NEUTROPHILS # BLD: 12.1 K/UL (ref 1.7–7.7)
NEUTROPHILS NFR BLD: 85.6 %
NITRITE, POC: NEGATIVE
PH, POC: 5.5
PLATELET # BLD AUTO: 344 K/UL (ref 135–450)
PMV BLD AUTO: 7.2 FL (ref 5–10.5)
POTASSIUM SERPL-SCNC: 4.3 MMOL/L (ref 3.5–5.1)
PROT SERPL-MCNC: 8.2 G/DL (ref 6.4–8.2)
PROTEIN, POC: NORMAL
RBC # BLD AUTO: 4.68 M/UL (ref 4.2–5.9)
SARS-COV-2 RNA RESP QL NAA+PROBE: NOT DETECTED
SODIUM SERPL-SCNC: 132 MMOL/L (ref 136–145)
SPECIFIC GRAVITY, POC: >=1.03
TROPONIN, HIGH SENSITIVITY: 36 NG/L (ref 0–22)
TROPONIN, HIGH SENSITIVITY: 51 NG/L (ref 0–22)
UROBILINOGEN, POC: NORMAL
WBC # BLD AUTO: 14.1 K/UL (ref 4–11)

## 2024-07-15 PROCEDURE — 71260 CT THORAX DX C+: CPT

## 2024-07-15 PROCEDURE — 80061 LIPID PANEL: CPT

## 2024-07-15 PROCEDURE — 1123F ACP DISCUSS/DSCN MKR DOCD: CPT | Performed by: PHYSICIAN ASSISTANT

## 2024-07-15 PROCEDURE — 97530 THERAPEUTIC ACTIVITIES: CPT

## 2024-07-15 PROCEDURE — 99285 EMERGENCY DEPT VISIT HI MDM: CPT

## 2024-07-15 PROCEDURE — 85025 COMPLETE CBC W/AUTO DIFF WBC: CPT

## 2024-07-15 PROCEDURE — 6360000002 HC RX W HCPCS: Performed by: PHYSICIAN ASSISTANT

## 2024-07-15 PROCEDURE — 83036 HEMOGLOBIN GLYCOSYLATED A1C: CPT

## 2024-07-15 PROCEDURE — G2211 COMPLEX E/M VISIT ADD ON: HCPCS | Performed by: PHYSICIAN ASSISTANT

## 2024-07-15 PROCEDURE — 87636 SARSCOV2 & INF A&B AMP PRB: CPT

## 2024-07-15 PROCEDURE — 1200000000 HC SEMI PRIVATE

## 2024-07-15 PROCEDURE — 97161 PT EVAL LOW COMPLEX 20 MIN: CPT

## 2024-07-15 PROCEDURE — 6360000004 HC RX CONTRAST MEDICATION: Performed by: PHYSICIAN ASSISTANT

## 2024-07-15 PROCEDURE — 97166 OT EVAL MOD COMPLEX 45 MIN: CPT

## 2024-07-15 PROCEDURE — 80053 COMPREHEN METABOLIC PANEL: CPT

## 2024-07-15 PROCEDURE — 2580000003 HC RX 258: Performed by: PHYSICIAN ASSISTANT

## 2024-07-15 PROCEDURE — 70450 CT HEAD/BRAIN W/O DYE: CPT

## 2024-07-15 PROCEDURE — 83605 ASSAY OF LACTIC ACID: CPT

## 2024-07-15 PROCEDURE — 1036F TOBACCO NON-USER: CPT | Performed by: PHYSICIAN ASSISTANT

## 2024-07-15 PROCEDURE — 71045 X-RAY EXAM CHEST 1 VIEW: CPT

## 2024-07-15 PROCEDURE — 6370000000 HC RX 637 (ALT 250 FOR IP)

## 2024-07-15 PROCEDURE — G8427 DOCREV CUR MEDS BY ELIG CLIN: HCPCS | Performed by: PHYSICIAN ASSISTANT

## 2024-07-15 PROCEDURE — 93005 ELECTROCARDIOGRAM TRACING: CPT | Performed by: PHYSICIAN ASSISTANT

## 2024-07-15 PROCEDURE — 74177 CT ABD & PELVIS W/CONTRAST: CPT

## 2024-07-15 PROCEDURE — 81002 URINALYSIS NONAUTO W/O SCOPE: CPT | Performed by: PHYSICIAN ASSISTANT

## 2024-07-15 PROCEDURE — 2580000003 HC RX 258

## 2024-07-15 PROCEDURE — 84484 ASSAY OF TROPONIN QUANT: CPT

## 2024-07-15 PROCEDURE — 6360000002 HC RX W HCPCS

## 2024-07-15 PROCEDURE — 82550 ASSAY OF CK (CPK): CPT

## 2024-07-15 PROCEDURE — 99214 OFFICE O/P EST MOD 30 MIN: CPT | Performed by: PHYSICIAN ASSISTANT

## 2024-07-15 PROCEDURE — 97116 GAIT TRAINING THERAPY: CPT

## 2024-07-15 PROCEDURE — G8420 CALC BMI NORM PARAMETERS: HCPCS | Performed by: PHYSICIAN ASSISTANT

## 2024-07-15 RX ORDER — 0.9 % SODIUM CHLORIDE 0.9 %
1000 INTRAVENOUS SOLUTION INTRAVENOUS ONCE
Status: COMPLETED | OUTPATIENT
Start: 2024-07-15 | End: 2024-07-15

## 2024-07-15 RX ORDER — ACETAMINOPHEN 325 MG/1
650 TABLET ORAL EVERY 6 HOURS PRN
Status: DISCONTINUED | OUTPATIENT
Start: 2024-07-15 | End: 2024-07-23 | Stop reason: HOSPADM

## 2024-07-15 RX ORDER — ONDANSETRON 2 MG/ML
4 INJECTION INTRAMUSCULAR; INTRAVENOUS EVERY 6 HOURS PRN
Status: DISCONTINUED | OUTPATIENT
Start: 2024-07-15 | End: 2024-07-23 | Stop reason: HOSPADM

## 2024-07-15 RX ORDER — ENOXAPARIN SODIUM 100 MG/ML
1 INJECTION SUBCUTANEOUS ONCE
Status: COMPLETED | OUTPATIENT
Start: 2024-07-15 | End: 2024-07-15

## 2024-07-15 RX ORDER — AZITHROMYCIN 250 MG/1
250 TABLET, FILM COATED ORAL DAILY
Status: COMPLETED | OUTPATIENT
Start: 2024-07-16 | End: 2024-07-21

## 2024-07-15 RX ORDER — SODIUM CHLORIDE 0.9 % (FLUSH) 0.9 %
5-40 SYRINGE (ML) INJECTION EVERY 12 HOURS SCHEDULED
Status: DISCONTINUED | OUTPATIENT
Start: 2024-07-15 | End: 2024-07-23 | Stop reason: HOSPADM

## 2024-07-15 RX ORDER — TAMSULOSIN HYDROCHLORIDE 0.4 MG/1
0.4 CAPSULE ORAL DAILY
Status: DISCONTINUED | OUTPATIENT
Start: 2024-07-15 | End: 2024-07-23 | Stop reason: HOSPADM

## 2024-07-15 RX ORDER — SODIUM CHLORIDE 9 MG/ML
INJECTION, SOLUTION INTRAVENOUS PRN
Status: DISCONTINUED | OUTPATIENT
Start: 2024-07-15 | End: 2024-07-23 | Stop reason: HOSPADM

## 2024-07-15 RX ORDER — SODIUM CHLORIDE 0.9 % (FLUSH) 0.9 %
5-40 SYRINGE (ML) INJECTION PRN
Status: DISCONTINUED | OUTPATIENT
Start: 2024-07-15 | End: 2024-07-23 | Stop reason: HOSPADM

## 2024-07-15 RX ORDER — AZITHROMYCIN 250 MG/1
500 TABLET, FILM COATED ORAL ONCE
Status: COMPLETED | OUTPATIENT
Start: 2024-07-15 | End: 2024-07-15

## 2024-07-15 RX ORDER — ATORVASTATIN CALCIUM 10 MG/1
10 TABLET, FILM COATED ORAL DAILY
Status: DISCONTINUED | OUTPATIENT
Start: 2024-07-16 | End: 2024-07-16

## 2024-07-15 RX ORDER — ACETAMINOPHEN 650 MG/1
650 SUPPOSITORY RECTAL EVERY 6 HOURS PRN
Status: DISCONTINUED | OUTPATIENT
Start: 2024-07-15 | End: 2024-07-23 | Stop reason: HOSPADM

## 2024-07-15 RX ORDER — CITALOPRAM 20 MG/1
10 TABLET ORAL DAILY
Status: DISCONTINUED | OUTPATIENT
Start: 2024-07-16 | End: 2024-07-23 | Stop reason: HOSPADM

## 2024-07-15 RX ORDER — POLYETHYLENE GLYCOL 3350 17 G/17G
17 POWDER, FOR SOLUTION ORAL DAILY PRN
Status: DISCONTINUED | OUTPATIENT
Start: 2024-07-15 | End: 2024-07-23 | Stop reason: HOSPADM

## 2024-07-15 RX ORDER — ONDANSETRON 4 MG/1
4 TABLET, ORALLY DISINTEGRATING ORAL EVERY 8 HOURS PRN
Status: DISCONTINUED | OUTPATIENT
Start: 2024-07-15 | End: 2024-07-23 | Stop reason: HOSPADM

## 2024-07-15 RX ADMIN — SODIUM CHLORIDE: 9 INJECTION, SOLUTION INTRAVENOUS at 21:00

## 2024-07-15 RX ADMIN — AZITHROMYCIN 500 MG: 250 TABLET, FILM COATED ORAL at 21:01

## 2024-07-15 RX ADMIN — IOPAMIDOL 75 ML: 755 INJECTION, SOLUTION INTRAVENOUS at 13:47

## 2024-07-15 RX ADMIN — APIXABAN 10 MG: 5 TABLET, FILM COATED ORAL at 21:02

## 2024-07-15 RX ADMIN — CEFTRIAXONE SODIUM 1000 MG: 1 INJECTION, POWDER, FOR SOLUTION INTRAMUSCULAR; INTRAVENOUS at 21:01

## 2024-07-15 RX ADMIN — SODIUM CHLORIDE 1000 ML: 9 INJECTION, SOLUTION INTRAVENOUS at 11:56

## 2024-07-15 RX ADMIN — SODIUM CHLORIDE, PRESERVATIVE FREE 10 ML: 5 INJECTION INTRAVENOUS at 21:00

## 2024-07-15 RX ADMIN — ENOXAPARIN SODIUM 80 MG: 100 INJECTION SUBCUTANEOUS at 15:02

## 2024-07-15 ASSESSMENT — PATIENT HEALTH QUESTIONNAIRE - PHQ9
3. TROUBLE FALLING OR STAYING ASLEEP: NOT AT ALL
SUM OF ALL RESPONSES TO PHQ9 QUESTIONS 1 & 2: 0
8. MOVING OR SPEAKING SO SLOWLY THAT OTHER PEOPLE COULD HAVE NOTICED. OR THE OPPOSITE, BEING SO FIGETY OR RESTLESS THAT YOU HAVE BEEN MOVING AROUND A LOT MORE THAN USUAL: NOT AT ALL
6. FEELING BAD ABOUT YOURSELF - OR THAT YOU ARE A FAILURE OR HAVE LET YOURSELF OR YOUR FAMILY DOWN: NOT AT ALL
SUM OF ALL RESPONSES TO PHQ QUESTIONS 1-9: 0
7. TROUBLE CONCENTRATING ON THINGS, SUCH AS READING THE NEWSPAPER OR WATCHING TELEVISION: NOT AT ALL
2. FEELING DOWN, DEPRESSED OR HOPELESS: NOT AT ALL
5. POOR APPETITE OR OVEREATING: NOT AT ALL
SUM OF ALL RESPONSES TO PHQ QUESTIONS 1-9: 0
1. LITTLE INTEREST OR PLEASURE IN DOING THINGS: NOT AT ALL
10. IF YOU CHECKED OFF ANY PROBLEMS, HOW DIFFICULT HAVE THESE PROBLEMS MADE IT FOR YOU TO DO YOUR WORK, TAKE CARE OF THINGS AT HOME, OR GET ALONG WITH OTHER PEOPLE: NOT DIFFICULT AT ALL
9. THOUGHTS THAT YOU WOULD BE BETTER OFF DEAD, OR OF HURTING YOURSELF: NOT AT ALL
4. FEELING TIRED OR HAVING LITTLE ENERGY: NOT AT ALL
SUM OF ALL RESPONSES TO PHQ QUESTIONS 1-9: 0
SUM OF ALL RESPONSES TO PHQ QUESTIONS 1-9: 0

## 2024-07-15 ASSESSMENT — PAIN - FUNCTIONAL ASSESSMENT: PAIN_FUNCTIONAL_ASSESSMENT: NONE - DENIES PAIN

## 2024-07-15 ASSESSMENT — ENCOUNTER SYMPTOMS: RESPIRATORY NEGATIVE: 1

## 2024-07-15 NOTE — ED PROVIDER NOTES
[facility]  ED  EMERGENCY DEPARTMENT ENCOUNTER        Pt Name: Wes Yanez  MRN: [id number]  Birthdate 1945  Date of evaluation: 7/15/2024  Provider: GUMARO BLAKE PA-C  PCP: Michelle Lutz PA  ED Attending: Wilfredo Aguilar DO       I have seen and evaluated this patient with my supervising physician Wilfredo Aguilar DO.    CHIEF COMPLAINT:     Chief Complaint   Patient presents with    Failure To Thrive     Patient to the ED for unable to ambulate, failure to thrive, and not really able to take care of himself. Son at bedside states hx of dementia but has been doing good up until the last week. Reports patient isn't able to walk, lives in an independent living place, and may need more assistance.        HISTORY OF PRESENT ILLNESS:      History provided by the patient and his son.  Limitations: Dementia.    Wes Yanez is a 78 y.o. male who arrives to the ED by private vehicle.  Patient poor historian due to underlying dementia.  He personally has no complaints, no pain.  The patient's son brought the patient in, nearly had to carry him in.  The patient lives at [facility].  He was diagnosed with dementia approximately 1 year ago and in the last week or 2 has declined.  He has not been eating or drinking much.  He is not as active.  The patient son states he has hired nurse aides to check on him every morning.  This morning, he was found in bed having urinated and defecated on himself.  The patient's son called PCP and the patient was seen by Michelle Lutz PA-C in the office.  It is reported that a UA was checked and was normal.  Based on the patient's weakness and decline, he is brought here for evaluation.  The patient's son believes he ultimately will require higher level of care such as assisted living.  He would like him to stay in the same facility-[facility].    Nursing Notes were reviewed     REVIEW OF SYSTEMS:     Review of Systems  Positives and pertinent  exposure control, iterative reconstruction, and/or weight based adjustment of the mA/kV was utilized to reduce the radiation dose to as low as reasonably achievable.; CT of the abdomen and pelvis was performed with the administration of intravenous contrast. Multiplanar reformatted images are provided for review. Automated exposure control, iterative reconstruction, and/or weight based adjustment of the mA/kV was utilized to reduce the radiation dose to as low as reasonably achievable. COMPARISON: None. HISTORY: ORDERING SYSTEM PROVIDED HISTORY: abnormal chest xray, weak, incontinent, confused TECHNOLOGIST PROVIDED HISTORY: Reason for exam:->abnormal chest xray, weak, incontinent, confused Additional Contrast?->1 Reason for Exam: abnormal cxr, weakness, confusion Additional signs and symptoms: pt has alzheimers poor historian CTA CHEST Pulmonary Arteries: Not occlusive pulmonary emboli in the right lower lobe and multiple subsegmental and segmental arteries.  Main pulmonary artery is of normal size.  Tiny subsegmental emboli in the left lower lobe. Mediastinum: No evidence of mediastinal lymphadenopathy.  The heart is enlarged.  Globular cardiomegaly..  Ascending thoracic aorta is borderline aneurysmal.  Anterior to posterior dimension is 3.8 cm.  Transverse dimension is 4 cm.  Negative for dissection.. Lungs/pleura: Respiratory motion.  Bibasilar atelectasis.  No consolidation. Trachea and bronchi are patent. soft Tissues/Bones: Spondylosis.  Osteoarthritic change of the sternoclavicular joints.  Osteoarthritic changes at the glenohumeral joints. CT ABDOMEN AND PELVIS Liver: Liver is normal density. No enhancing masses.  Normal enhancement of the intrahepatic vasculature. Spleen:  Normal size.  No enhancing masses Pancreas: No enhancing masses.  No ductal dilation. No adjacent fatty stranding. Gallbladder no calcified stones or sludge.  No pericholecystic fluid.  No wall thickening. Bile ducts: No biliary ductal

## 2024-07-15 NOTE — PROGRESS NOTES
Physical Therapy  Facility/Department: [facility]  ED  Physical Therapy Initial Assessment    Name: Wes Yanez  : 1945  MRN: [id number]  Date of Service: 7/15/2024    Discharge Recommendations:  Subacute/Skilled Nursing Facility, IP Rehab          Patient Diagnosis(es): The primary encounter diagnosis was Acute pulmonary embolism without acute cor pulmonale, unspecified pulmonary embolism type (HCC). Diagnoses of Generalized weakness, Failure to thrive in adult, and Stroke-like symptoms were also pertinent to this visit.  Past Medical History:  has a past medical history of Elevated blood pressure reading without diagnosis of hypertension.  Past Surgical History:  has a past surgical history that includes Thumb amputation (Left); Finger amputation (Right); Knee cartilage surgery (Left); and shoulder surgery (Right).    Assessment   Body Structures, Functions, Activity Limitations Requiring Skilled Therapeutic Intervention: Decreased functional mobility ;Decreased ADL status;Decreased ROM;Decreased strength;Decreased safe awareness;Decreased cognition;Decreased endurance;Decreased balance;Decreased high-level IADLs  Assessment: Patient has admitted to [facility] secondary to incontinence, generalized weakness and failure to thrive. Patient presents with general weakness, presence of tremors, impairments in balance and safety awareness leading to difficulty with bed mobility, transfers, and gait. Recommend skilled PT services to address deficits listed. Recommend short term rehab stay in order to progress to PLOF and safe return home.  Treatment Diagnosis: generalized weakness  Therapy Prognosis: Good  Decision Making: Low Complexity  Barriers to Learning: cognition  Requires PT Follow-Up: Yes  Activity Tolerance  Activity Tolerance: Patient tolerated treatment well;Treatment limited secondary to decreased cognition     Plan   Physical Therapy Plan  General Plan: 3-5 times per week  Current Treatment

## 2024-07-15 NOTE — H&P
V2.0  History and Physical      Name:  Wes Yanez /Age/Sex: 1945  (78 y.o. male)   MRN & CSN:  [id number] & [id number] Encounter Date/Time: 7/15/2024 3:26 PM EDT   Location:   PCP: Michelle Lutz PA       Hospital Day: 1    Assessment and Plan:   Wes Yanez is a 78 y.o. male with a pmh of possible BPH on Flomax, hyperlipidemia, Alzheimer's dementia who presents with Weakness    Hospital Problems             Last Modified POA    * (Principal) Weakness 7/15/2024 Yes     Plan:    Bilateral nonocclusive pulmonary embolism  -CT PE showed nonocclusive pulmonary emboli in the lower lobes  -Start Eliquis 10 mg twice daily for 7 days followed by Eliquis 5 mg twice daily    Generalized weakness  Rule out CVA  -Patient presented with deficits consisting of generalized weakness, bilateral lower extremity weakness, tremors, inability to follow extraocular movements and commands on exam, although unsure if this is due to dementia  -CT head pending  -Echo pending  -Lipid panel and A1c pending  -Continue atorvastatin.  Pending results of CT head, may start baby aspirin  -PT/OT and SLP eval    Bilateral lower extremity weakness  Urinary/fecal incontinence  -Repeat episodes of urinary/fecal incontinence.  Unclear whether true incontinence or if patient's lower extremity weakness contributes to him not being able to ambulate for urination and bowel movements  -CT abdomen pelvis showed moderate amount of stool in the colon  -Differentials for incontinence include constipation versus spine pathology, cauda equina syndrome, Guillain-[place] syndrome  -MRI lumbar spine pending  -Continue GlycoLax as needed    Pneumonia  Leukocytosis  -Chest x-ray showed streaky airspace opacity of the left lung base possibly representing atelectasis versus developing pneumonia  -WBC 14.1  -COVID/flu swab pending  -Start IV ceftriaxone and oral azithromycin    Elevated troponin  -Initial troponin 36 with repeat 51  -EKG showed normal  Osteoarthritic changes at the glenohumeral joints. CT ABDOMEN AND PELVIS Liver: Liver is normal density. No enhancing masses.  Normal enhancement of the intrahepatic vasculature. Spleen:  Normal size.  No enhancing masses Pancreas: No enhancing masses.  No ductal dilation. No adjacent fatty stranding. Gallbladder no calcified stones or sludge.  No pericholecystic fluid.  No wall thickening. Bile ducts: No biliary ductal dilation Adrenals: The adrenal glands are unremarkable Kidneys: Kidneys are normal in appearance.  No hydronephrosis.  No enhancing masses. Norenal stones.  Multiple simple appearing parapelvic cysts. GI: No small bowel dilation.  No colonic wall thickening.  No large mass. The stomach is unremarkable in appearance.Moderate amount of stool in the colon. Mesentery: No enlarged lymphadenopathy. No free fluid. No free gas. Aorta: Aorta is of normal size.  Negative for dissection.  IVC is unremarkable.Celiac axis and SMA are patent. Portal vein is patent. PELVIS GI: No small bowel dilation.  No colonic wall thickening.  No enlarged lymphadenopathy. no free fluid in the pelvis. : The bladder is unremarkable in appearance.  No large mass.  Prostate is unremarkable. Osseous: Osteoarthritic change of the SI joints.  Heterotopic ossification at the right acetabulum.  Advanced facet arthropathy at the lower lumbar spine. Bridging osteophytes at the SI joints.     CTA CHEST: 1. Nonocclusive pulmonary emboli in the lower lobes. 2. Ectasia of the ascending thoracic aorta. 3. Globular cardiomegaly but no other evidence for right heart strain. CT ABDOMEN AND PELVIS Moderate amount of stool in the colon     XR CHEST PORTABLE    Result Date: 7/15/2024  EXAMINATION: ONE XRAY VIEW OF THE CHEST 7/15/2024 11:51 am COMPARISON: None. HISTORY: ORDERING SYSTEM PROVIDED HISTORY: weak, not eating/drinking, failure to thrive TECHNOLOGIST PROVIDED HISTORY: Reason for exam:->weak, not eating/drinking, failure to thrive Reason

## 2024-07-15 NOTE — CARE COORDINATION
Case Management Assessment  Initial Evaluation    Date/Time of Evaluation: 7/15/2024 3:41 PM  Assessment Completed by: Cesia Curry RN    If patient is discharged prior to next notation, then this note serves as note for discharge by case management.    Patient Name: Wes Yanez                   YOB: 1945  Diagnosis: No admission diagnoses are documented for this encounter.                   Date / Time: 7/15/2024 11:38 AM    Patient Admission Status: Emergency   Readmission Risk (Low < 19, Mod (19-27), High > 27): No data recorded  Current PCP: Michelle Lutz PA  PCP verified by CM? Yes    Chart Reviewed: Yes      History Provided by: Child/Family  Patient Orientation:      Patient Cognition: Long Term Memory Deficit    Hospitalization in the last 30 days (Readmission):  No    If yes, Readmission Assessment in CM Navigator will be completed.    Advance Directives:      Code Status: Not on file   Patient's Primary Decision Maker is:      Primary Decision Maker (Active): Marko Yanez - Child - [phone]    Discharge Planning:    Patient lives with:   Type of Home: Independent Living  Primary Care Giver: Self  Patient Support Systems include: Children   Current Financial resources: Medicare  Current community resources:    Current services prior to admission: Durable Medical Equipment            Current DME: Shower Chair, Other (Comment) (grab bars)            Type of Home Care services:       ADLS  Prior functional level: Independent in ADLs/IADLs  Current functional level:      PT AM-PAC:   /24  OT AM-PAC:   /24    Family can provide assistance at DC: Yes  Would you like Case Management to discuss the discharge plan with any other family members/significant others, and if so, who? Yes  Plans to Return to Present Housing: Unknown at present  Potential Assistance needed at discharge: Skilled Nursing Facility            Potential DME:    Patient expects to discharge to: Unknown  Plan for  transportation at discharge:      Financial    Payor: MEDICARE / Plan: MEDICARE PART A AND B / Product Type: *No Product type* /     Does insurance require precert for SNF: No    Potential assistance Purchasing Medications: No  Meds-to-Beds request:        [id number] - [place], [place] - [address] - P [phone] - F [phone]  [address] [place] [address]  Phone: [phone] Fax: [phone]      Notes:    Factors facilitating achievement of predicted outcomes: Family support, Cooperative, and Pleasant      Additional Case Management Notes: Spoke with patient and son at bedside.   Patient lives at [facility].   Patient is normally independent at home.   Son feels as though patient may need SNF at discharge.  Updated him that therapy will be working with patient later today and CM will follow up afterward.  Son verbalized understanding.         IF APPLICABLE: The Patient and/or patient representative Wes and his family were provided with a choice of provider and agrees with the discharge plan. Freedom of choice list with basic dialogue that supports the patient's individualized plan of care/goals and shares the quality data associated with the providers was provided to:     Patient Representative Name:       The Patient and/or Patient Representative Agree with the Discharge Plan?      Cesia Curry RN  Case Management Department  Ph: [phone]

## 2024-07-15 NOTE — PROGRESS NOTES
Subjective   Patient ID: Wes Yanez is a 78 y.o. male.    HPI  Patient presents with son today. Last week patient was doing fine, walking on his own, going to [facility] on his own. Independent living staff reports that over the weekend patient has been not able to get out of his bed, has not been eating. Incontinent of bowel and bladder ,likely due to not being able to get up on his own. Son went to pick him up today and he was on the floor by his bed and was not able to move on his own. Son had to dress him and carry him and borrow and scooter from the home to get him here.     Appearance today is much different than patient's normal. He is disheveled, more confused than typical. Frail appearing and tremulous.       Review of Systems   Constitutional:  Positive for appetite change.   Respiratory: Negative.     Cardiovascular: Negative.    Genitourinary:         Incontinence   Neurological:  Positive for tremors and weakness.          Objective   Physical Exam  Constitutional:       Comments: disheveled   Cardiovascular:      Rate and Rhythm: Normal rate and regular rhythm.      Heart sounds: Normal heart sounds.   Pulmonary:      Effort: Pulmonary effort is normal.      Breath sounds: Normal breath sounds.   Neurological:      Mental Status: He is alert.      Motor: Weakness present.      Gait: Gait abnormal.            Assessment /plan;     Diagnosis Orders   1. Urine frequency  POCT Urinalysis no Micro      2. Weakness          With sudden onset of decompensation will refer to ER foe evaluation. Patient is not able to return to independent living in his current state.       JOSE D Wells

## 2024-07-15 NOTE — ED NOTES
Patient Name: Wes Yanez  :  1945  78 y.o.  MRN:  [id number]  Preferred Name  Wes  ED Room #:    Family/Caregiver Present no  Restraints no  Sitter no  Sepsis Risk Score      Situation  Code Status: No Order No additional code details.    Allergies: Tramadol hcl (er biphasic)  Weight: Patient Vitals for the past 96 hrs (Last 3 readings):   Weight   07/15/24 1145 81.6 kg (180 lb)     Arrived from: nursing home  Chief Complaint:   Chief Complaint   Patient presents with    Failure To Thrive     Patient to the ED for unable to ambulate, failure to thrive, and not really able to take care of himself. Son at bedside states hx of dementia but has been doing good up until the last week. Reports patient isn't able to walk, lives in an independent living place, and may need more assistance.      Hospital Problem/Diagnosis:  Principal Problem:    Weakness  Resolved Problems:    * No resolved hospital problems. *    Imaging:   CT ABDOMEN PELVIS W IV CONTRAST Additional Contrast? None   Final Result   CTA CHEST:      1. Nonocclusive pulmonary emboli in the lower lobes.   2. Ectasia of the ascending thoracic aorta.   3. Globular cardiomegaly but no other evidence for right heart strain.   CT ABDOMEN AND PELVIS      Moderate amount of stool in the colon         CT CHEST PULMONARY EMBOLISM W CONTRAST   Final Result   CTA CHEST:      1. Nonocclusive pulmonary emboli in the lower lobes.   2. Ectasia of the ascending thoracic aorta.   3. Globular cardiomegaly but no other evidence for right heart strain.   CT ABDOMEN AND PELVIS      Moderate amount of stool in the colon         XR CHEST PORTABLE   Final Result   Streaky airspace opacity of the left lung base may represent atelectasis   versus developing pneumonia.           Abnormal labs:   Abnormal Labs Reviewed   CBC WITH AUTO DIFFERENTIAL - Abnormal; Notable for the following components:       Result Value    WBC 14.1 (*)     Neutrophils Absolute 12.1 (*)

## 2024-07-15 NOTE — PROGRESS NOTES
Occupational Therapy  Facility/Department: [facility]  ED  Occupational Therapy Initial Assessment and Treatment    Name: Wes Yanez  : 1945  MRN: [id number]  Date of Service: 7/15/2024    Discharge Recommendations:  Subacute/Skilled Nursing Facility, IP Rehab, Patient able to tolerate 3 hours of therapy per day  OT Equipment Recommendations  Equipment Needed: No  Other: defer     Therapy discharge recommendations are subject to collaboration from the patient’s interdisciplinary healthcare team, including MD and case management recommendations.    Barriers to Home Discharge:   [] Steps to access home entry or bed/bath:   [x] Unable to transfer, ambulate, or propel wheelchair household distances without assist   [x] Limited available assist at home upon discharge    [x] Patient or family requests d/c to post-acute facility    [x] Poor cognition/safety awareness for d/c to home alone    [] Unable to maintain ordered weight bearing status    [] Patient with salient signs of long-standing immobility   [x] Decreased independence with ADLs   [x] Increased risk for falls   [] Other:    If pt is unable to be seen after this session, please let this note serve as discharge summary.  Please see case management note for discharge disposition.  Thank you.    Patient Diagnosis(es): The primary encounter diagnosis was Acute pulmonary embolism without acute cor pulmonale, unspecified pulmonary embolism type (HCC). Diagnoses of Generalized weakness, Failure to thrive in adult, and Stroke-like symptoms were also pertinent to this visit.  Past Medical History:  has a past medical history of Elevated blood pressure reading without diagnosis of hypertension.  Past Surgical History:  has a past surgical history that includes Thumb amputation (Left); Finger amputation (Right); Knee cartilage surgery (Left); and shoulder surgery (Right).           Assessment   Performance deficits / Impairments: Decreased  Accessibility: Accessible  Home Equipment: None  Has the patient had two or more falls in the past year or any fall with injury in the past year?: No  Receives Help From: Personal care attendant, Home health (Aids present daily 30 min to assist to take meds, assist for ADLs)  ADL Assistance: Needs assistance  Homemaking Responsibilities: No  Ambulation Assistance: Independent  Transfer Assistance: Independent  Active : No  Additional Comments: Son Marko providing history, reports patient tends to skip meals in dining room, recently not eating as much. Patient goes for 20-25 mintues walks without AD on grounds of ILF.       Objective   Temp: 98.2 °F (36.8 °C)  Pulse: 86  Respirations: 23  SpO2: 96 %  O2 Device: None (Room air)  BP: 131/61  MAP (Calculated): 84             Safety Devices  Type of Devices: Bed alarm in place;Call light within reach;Gait belt;Left in bed;Nurse notified  Restraints  Restraints Initially in Place: No  Bed Mobility Training  Bed Mobility Training: Yes  Supine to Sit: Moderate assistance (towards L)  Sit to Supine: Minimum assistance (towards R)  Scooting: Moderate assistance  Balance  Sitting: Intact  Standing: With support;Impaired  Standing - Static: Poor  Standing - Dynamic: Poor  Transfer Training  Transfer Training: Yes  Overall Level of Assistance: Moderate assistance  Interventions: Verbal cues;Tactile cues  Sit to Stand: Moderate assistance (x2 from EOB)  Stand to Sit: Moderate assistance       AROM: Generally decreased, functional  Strength: Generally decreased, functional  ADL  Functional Mobility: Moderate assistance;Minimal assistance;Increased time to complete  Functional Mobility Skilled Clinical Factors: Min-modA for short distances in hallway, multiple LOB and cues for pacing  Additional Comments: Declines ADL needs at this time.     Activity Tolerance  Activity Tolerance: Patient tolerated treatment well;Treatment limited secondary to decreased cognition

## 2024-07-15 NOTE — ED PROVIDER NOTES
I independently examined and evaluated Wes Yanez.  I personally saw the patient and performed a substantive portion of the visit including all aspects of the medical decision making.    In brief, this 78-year-old male presents due to weakness, confusion from and living.  Apparently over the last couple of days they have been having trouble getting him out of bed which is abnormal for him.  He was incontinent of urine and stool in his bed as well which is also abnormal.  Patient himself is confused on exam, has no complaints.     Focused exam revealed   General: Alert, no acute distress, patient resting comfortably   Skin: warm, intact, no pallor noted   Head: Normocephalic, atraumatic   Eye: Normal conjunctiva   Cardiac: Normal peripheral perfusion  Respiratory: No acute distress   Musculoskeletal: No deformity, full ROM.   Neurological: alert and pleasantly confused, normal sensory and motor observed.   Psychiatric: Cooperative    ED course: Screening lab work obtained and is reassuring.  Chest x-ray shows questionable pneumonia, uncertain of patient's symptoms as he is not a reliable historian.  CTPA and abdomen/pelvis obtained and does show multiple bilateral PEs.  Due to this, given Lovenox and will be admitted for further treatment and evaluation.    ECG    The Ekg interpreted by me shows sinus rhythm with a rate of 74 bpm.  Normal axis.  No acute injury pattern.  , QRS 74, QTc 444.    All diagnostic, treatment, and disposition decisions were made by myself in conjunction with the advanced practice provider/resident.    I personally saw the patient and made/approved the management plan and take responsibility for the patient management.     For all further details of the patient's emergency department visit, please see the advanced practice provider's/resident's documentation.     Wilfredo Aguilar,   07/15/24 9910

## 2024-07-16 ENCOUNTER — APPOINTMENT (OUTPATIENT)
Dept: MRI IMAGING | Age: 79
End: 2024-07-16
Payer: MEDICARE

## 2024-07-16 ENCOUNTER — APPOINTMENT (OUTPATIENT)
Dept: CT IMAGING | Age: 79
End: 2024-07-16
Payer: MEDICARE

## 2024-07-16 ENCOUNTER — APPOINTMENT (OUTPATIENT)
Dept: GENERAL RADIOLOGY | Age: 79
End: 2024-07-16
Payer: MEDICARE

## 2024-07-16 LAB
ANION GAP SERPL CALCULATED.3IONS-SCNC: 9 MMOL/L (ref 3–16)
BACTERIA UR CULT: NORMAL
BASOPHILS # BLD: 0 K/UL (ref 0–0.2)
BASOPHILS NFR BLD: 0.3 %
BUN SERPL-MCNC: 17 MG/DL (ref 7–20)
CALCIUM SERPL-MCNC: 8.5 MG/DL (ref 8.3–10.6)
CHLORIDE SERPL-SCNC: 102 MMOL/L (ref 99–110)
CHOLEST SERPL-MCNC: 129 MG/DL (ref 0–199)
CO2 SERPL-SCNC: 23 MMOL/L (ref 21–32)
CREAT SERPL-MCNC: <0.5 MG/DL (ref 0.8–1.3)
DEPRECATED RDW RBC AUTO: 14.4 % (ref 12.4–15.4)
EKG ATRIAL RATE: 74 BPM
EKG DIAGNOSIS: NORMAL
EKG P AXIS: 16 DEGREES
EKG P-R INTERVAL: 178 MS
EKG Q-T INTERVAL: 400 MS
EKG QRS DURATION: 74 MS
EKG QTC CALCULATION (BAZETT): 444 MS
EKG R AXIS: -2 DEGREES
EKG T AXIS: 22 DEGREES
EKG VENTRICULAR RATE: 74 BPM
EOSINOPHIL # BLD: 0 K/UL (ref 0–0.6)
EOSINOPHIL NFR BLD: 0.5 %
EST. AVERAGE GLUCOSE BLD GHB EST-MCNC: 114 MG/DL
GFR SERPLBLD CREATININE-BSD FMLA CKD-EPI: >90 ML/MIN/{1.73_M2}
GLUCOSE SERPL-MCNC: 97 MG/DL (ref 70–99)
HBA1C MFR BLD: 5.6 %
HCT VFR BLD AUTO: 35.8 % (ref 40.5–52.5)
HDLC SERPL-MCNC: 55 MG/DL (ref 40–60)
HGB BLD-MCNC: 12.3 G/DL (ref 13.5–17.5)
LDLC SERPL CALC-MCNC: 64 MG/DL
LYMPHOCYTES # BLD: 0.9 K/UL (ref 1–5.1)
LYMPHOCYTES NFR BLD: 9.3 %
MCH RBC QN AUTO: 30.3 PG (ref 26–34)
MCHC RBC AUTO-ENTMCNC: 34.3 G/DL (ref 31–36)
MCV RBC AUTO: 88.3 FL (ref 80–100)
MONOCYTES # BLD: 0.8 K/UL (ref 0–1.3)
MONOCYTES NFR BLD: 8.5 %
NEUTROPHILS # BLD: 7.9 K/UL (ref 1.7–7.7)
NEUTROPHILS NFR BLD: 81.4 %
PLATELET # BLD AUTO: 264 K/UL (ref 135–450)
PMV BLD AUTO: 7.1 FL (ref 5–10.5)
POTASSIUM SERPL-SCNC: 3.7 MMOL/L (ref 3.5–5.1)
RBC # BLD AUTO: 4.05 M/UL (ref 4.2–5.9)
SODIUM SERPL-SCNC: 134 MMOL/L (ref 136–145)
TRIGL SERPL-MCNC: 49 MG/DL (ref 0–150)
TROPONIN, HIGH SENSITIVITY: 42 NG/L (ref 0–22)
VLDLC SERPL CALC-MCNC: 10 MG/DL
WBC # BLD AUTO: 9.8 K/UL (ref 4–11)

## 2024-07-16 PROCEDURE — 70551 MRI BRAIN STEM W/O DYE: CPT

## 2024-07-16 PROCEDURE — 97530 THERAPEUTIC ACTIVITIES: CPT

## 2024-07-16 PROCEDURE — 92526 ORAL FUNCTION THERAPY: CPT

## 2024-07-16 PROCEDURE — 92611 MOTION FLUOROSCOPY/SWALLOW: CPT

## 2024-07-16 PROCEDURE — 6370000000 HC RX 637 (ALT 250 FOR IP)

## 2024-07-16 PROCEDURE — 51798 US URINE CAPACITY MEASURE: CPT

## 2024-07-16 PROCEDURE — 80048 BASIC METABOLIC PNL TOTAL CA: CPT

## 2024-07-16 PROCEDURE — 36415 COLL VENOUS BLD VENIPUNCTURE: CPT

## 2024-07-16 PROCEDURE — 92610 EVALUATE SWALLOWING FUNCTION: CPT

## 2024-07-16 PROCEDURE — 93010 ELECTROCARDIOGRAM REPORT: CPT | Performed by: INTERNAL MEDICINE

## 2024-07-16 PROCEDURE — 6360000002 HC RX W HCPCS

## 2024-07-16 PROCEDURE — 2580000003 HC RX 258

## 2024-07-16 PROCEDURE — 97535 SELF CARE MNGMENT TRAINING: CPT

## 2024-07-16 PROCEDURE — 85025 COMPLETE CBC W/AUTO DIFF WBC: CPT

## 2024-07-16 PROCEDURE — 72148 MRI LUMBAR SPINE W/O DYE: CPT

## 2024-07-16 PROCEDURE — 74230 X-RAY XM SWLNG FUNCJ C+: CPT

## 2024-07-16 PROCEDURE — 70498 CT ANGIOGRAPHY NECK: CPT

## 2024-07-16 PROCEDURE — 84484 ASSAY OF TROPONIN QUANT: CPT

## 2024-07-16 PROCEDURE — 6360000004 HC RX CONTRAST MEDICATION

## 2024-07-16 PROCEDURE — 97116 GAIT TRAINING THERAPY: CPT

## 2024-07-16 PROCEDURE — 1200000000 HC SEMI PRIVATE

## 2024-07-16 RX ORDER — ATORVASTATIN CALCIUM 10 MG/1
20 TABLET, FILM COATED ORAL DAILY
Status: DISCONTINUED | OUTPATIENT
Start: 2024-07-17 | End: 2024-07-23 | Stop reason: HOSPADM

## 2024-07-16 RX ORDER — ASPIRIN 81 MG/1
81 TABLET, CHEWABLE ORAL DAILY
Status: DISCONTINUED | OUTPATIENT
Start: 2024-07-16 | End: 2024-07-23 | Stop reason: HOSPADM

## 2024-07-16 RX ADMIN — APIXABAN 10 MG: 5 TABLET, FILM COATED ORAL at 09:53

## 2024-07-16 RX ADMIN — AZITHROMYCIN 250 MG: 250 TABLET, FILM COATED ORAL at 09:52

## 2024-07-16 RX ADMIN — APIXABAN 10 MG: 5 TABLET, FILM COATED ORAL at 21:10

## 2024-07-16 RX ADMIN — SODIUM CHLORIDE, PRESERVATIVE FREE 10 ML: 5 INJECTION INTRAVENOUS at 21:10

## 2024-07-16 RX ADMIN — CITALOPRAM HYDROBROMIDE 10 MG: 20 TABLET ORAL at 09:52

## 2024-07-16 RX ADMIN — CEFTRIAXONE SODIUM 1000 MG: 1 INJECTION, POWDER, FOR SOLUTION INTRAMUSCULAR; INTRAVENOUS at 18:35

## 2024-07-16 RX ADMIN — SODIUM CHLORIDE, PRESERVATIVE FREE 10 ML: 5 INJECTION INTRAVENOUS at 09:55

## 2024-07-16 RX ADMIN — IOPAMIDOL 75 ML: 755 INJECTION, SOLUTION INTRAVENOUS at 17:59

## 2024-07-16 RX ADMIN — ATORVASTATIN CALCIUM 10 MG: 10 TABLET, FILM COATED ORAL at 09:51

## 2024-07-16 RX ADMIN — ASPIRIN 81 MG 81 MG: 81 TABLET ORAL at 09:52

## 2024-07-16 NOTE — PROCEDURES
complete cued second swallow in an effort to clear.  Use of empty tsp also trialed in an attempt to facilitate swallow initiation in <50% of instances.  Again, episodes of penetration observed post-swallow at times d/t presence of this residual.  This resulted in eventual trace aspiration observed post-swallow from previously penetrated material (suspected with mildly and moderately thick liquid trials as well; addendum 7/19 0093: suspected episodes of micro aspiration noted only after [id number] review after completion of study).  Pt unable to consistently follow commands for cued cough (when pt is able to complete, cough is weak and ineffective).     Recommend thin liquids vs thickened liquids at this time as pt is considered at an increased risk of aspiration with both consistencies; should the patient experience aspiration, liquids of less/thinner viscosities are more likely/easier to expectorate from airway with cough and throat clear.  Recommend oral care q4 hours and 1:1 supervision with meals / cues for use of safe swallow strategies.            Pharyngeal residue post-swallow with puree initially (prior to use of attempted strategies to clear); note minimal retention of PO at level of cervical esophagus as well.       Esophageal Phase  Impaired  Upper Esophageal Screen- Major Contributing Deficits  Reduced Cricopharyngeal Opening: All  Decreased Esophageal Peristalsis: All        Prominence noted at level of cervical spine-- suspect contributes to presence of pyriform and UES residue (in addition to reduced cricopharyngeal opening) and minimal rentention of PO at level of cervical esophagus post-swallow at times.      Aspiration Scale   1 Material does not enter the airway    2 Material enters the airway, remains above the vocal folds, and is ejected from the airway    3 Material enters the airway, remains above the vocal folds, and is not ejected from the airway    4 Material enters the airway,  contacts the vocal folds, an is ejected from the airway    5 Material enters the airway, contacts the vocal folds, and is not ejected from the airway    6 Material enters the airway, passes below the vocal folds and is ejected into the larynx or out of the airway    7 Material enters the airway, passes below the vocal folds, and is not ejected from the trachea despite effort   X 8 Material enters the airway, passes below the vocal folds, and no effort is made to eject.           Compensatory Swallowing Strategies Attempted: small bites/sips, upright positioning, alternate bites/sips, double swallow  Postural Changes and/or Swallow Maneuvers Trialed: n/a; pt not appropriate given baseline cognitive deficit (Alzheimer's dementia per chart)  Patient Position: Lateral and Patient Degrees: 90 degrees, Seated upright in [facility] chair  Consistencies Administered: Thin-tsp, cup, straw; Mildly thick (nectar)-cup; Moderately thick (honey)-tsp; Puree; Reg Solid      Recommendations:  Diet recommendation: IDDSI 4 Puree Solids; IDDSI 0 Thin Liquids via small, single sip; Meds crushed in puree as able or whole with thin liquid as tolerated  Risk management: upright for all intake, stay upright for at least 30 mins after intake, small bites/sips, 1:1 supervision with intake, cued purposeful cough every 2-3 bites/sips as able, oral care q4 hrs to reduce adverse affects in the event of aspiration, increase physical mobility as able, alternate bites/sips, slow rate of intake, general GERD precautions, general aspiration precautions, and hold PO and contact SLP if s/s of aspiration or worsening respiratory status develop.    Pt with apparent reduced cricopharyngeal opening which results in significant residue at bilateral pyriforms and UES post-swallow with all PO (see images below).  Noted increased residue with textures of increased viscosity (I.e. regular solid vs puree; moderately thick liquid vs thin).  Instances of penetration and

## 2024-07-16 NOTE — PLAN OF CARE
Problem: Discharge Planning  Goal: Discharge to home or other facility with appropriate resources  7/16/2024 1034 by Nicky Gates RN  Outcome: Progressing  7/16/2024 0224 by Kitty Jones RN  Outcome: Progressing  7/16/2024 0222 by Kitty Jones RN  Outcome: Progressing     Problem: Safety - Adult  Goal: Free from fall injury  7/16/2024 1034 by Nicky Gates RN  Outcome: Progressing  7/16/2024 0224 by Kitty Jones RN  Outcome: Progressing  7/16/2024 0222 by Kitty Jones RN  Outcome: Progressing     Problem: Skin/Tissue Integrity  Goal: Absence of new skin breakdown  Description: 1.  Monitor for areas of redness and/or skin breakdown  2.  Assess vascular access sites hourly  3.  Every 4-6 hours minimum:  Change oxygen saturation probe site  4.  Every 4-6 hours:  If on nasal continuous positive airway pressure, respiratory therapy assess nares and determine need for appliance change or resting period.  7/16/2024 1034 by Nicky Gates RN  Outcome: Progressing  7/16/2024 0224 by Kitty Jones RN  Outcome: Progressing  7/16/2024 0222 by Kitty Jones RN  Outcome: Progressing

## 2024-07-16 NOTE — PLAN OF CARE
Problem: Discharge Planning  Goal: Discharge to home or other facility with appropriate resources  7/16/2024 0224 by Kitty Jones RN  Outcome: Progressing  7/16/2024 0222 by Kitty Jones RN  Outcome: Progressing     Problem: Safety - Adult  Goal: Free from fall injury  7/16/2024 0224 by Kitty Jones RN  Outcome: Progressing  7/16/2024 0222 by Kitty Jones RN  Outcome: Progressing     Problem: Skin/Tissue Integrity  Goal: Absence of new skin breakdown  Description: 1.  Monitor for areas of redness and/or skin breakdown  2.  Assess vascular access sites hourly  3.  Every 4-6 hours minimum:  Change oxygen saturation probe site  4.  Every 4-6 hours:  If on nasal continuous positive airway pressure, respiratory therapy assess nares and determine need for appliance change or resting period.  7/16/2024 0224 by Kitty Jones RN  Outcome: Progressing  7/16/2024 0222 by Kitty Jones RN  Outcome: Progressing

## 2024-07-16 NOTE — PROGRESS NOTES
Physical Therapy  Facility/Department: [facility] - MED SURG  Daily Treatment Note  NAME: Wes Yanez  : 1945  MRN: [id number]    Date of Service: 2024    Discharge Recommendations:  Subacute/Skilled Nursing Facility   PT Equipment Recommendations  Equipment Needed: No    Therapy discharge recommendations take into account each patient's current medical complexities and are subject to input/oversight from the patient's healthcare team.   Barriers to Home Discharge:   [] Steps to access home entry or bed/bath:   [x] Unable to transfer, ambulate, or propel wheelchair household distances without assist   [x] Limited available assist at home upon discharge    [] Patient or family requests d/c to post-acute facility    [x] Poor cognition/safety awareness for d/c to home alone    []Unable to maintain ordered weight bearing status    [] Patient with salient signs of long-standing immobility   [x] Patient is at risk for falls   [] Other:    If pt is unable to be seen after this session, please let this note serve as discharge summary.  Please see case management note for discharge disposition.  Thank you.    Patient Diagnosis(es): The primary encounter diagnosis was Acute pulmonary embolism without acute cor pulmonale, unspecified pulmonary embolism type (HCC). Diagnoses of Generalized weakness, Failure to thrive in adult, and Stroke-like symptoms were also pertinent to this visit.    Assessment   Assessment: Pt continues to require mod A for bed mobility and up to mod A for transfers. Pt does demonstrate improvement with transfers as session progressed. Will continue to progress mobility as pt tolerates. Recommend SNF for continued therapy at discharge.  Activity Tolerance: Patient tolerated treatment well;Treatment limited secondary to decreased cognition  Equipment Needed: No     Plan    Physical Therapy Plan  General Plan: 3-5 times per week  Current Treatment Recommendations: Strengthening;ROM;Balance  Goals   Patient Goals : To go home    Education  Patient Education  Education Given To: Patient  Education Provided: Role of Therapy;Plan of Care;Transfer Training;Family Education;Fall Prevention Strategies  Education Provided Comments: Disease Specific Education: Pt educated on importance of OOB mobility, prevention of complications of bedrest, and general safety during hospitalization.  Education Method: Verbal;Demonstration  Barriers to Learning: Cognition  Education Outcome: Verbalized understanding;Continued education needed    AM-PAC - Mobility    AM-PAC Basic Mobility - Inpatient   How much help is needed turning from your back to your side while in a flat bed without using bedrails?: A Little  How much help is needed moving from lying on your back to sitting on the side of a flat bed without using bedrails?: A Little  How much help is needed moving to and from a bed to a chair?: A Lot  How much help is needed standing up from a chair using your arms?: A Lot  How much help is needed walking in hospital room?: A Lot  How much help is needed climbing 3-5 steps with a railing?: A Lot  AM-PAC Inpatient Mobility Raw Score : 14  AM-PAC Inpatient T-Scale Score : 38.1  Mobility Inpatient CMS 0-100% Score: 61.29  Mobility Inpatient CMS G-Code Modifier : CL         Therapy Time   Individual Concurrent Group Co-treatment   Time In       0805   Time Out       0830   Minutes       25   Timed Code Treatment Minutes: 25 Minutes       Lucretia Murphy, PT [id number]

## 2024-07-16 NOTE — PROGRESS NOTES
V2.0    [employer] [facility] Progress Note      Name:  Wes Yanez /Age/Sex: 1945  (78 y.o. male)   MRN & CSN:  [id number] & [id number] Encounter Date/Time: 2024 7:27 AM EDT   Location:  0367/0367-01 PCP: Michelle Lutz PA     Attending:Ha Horne DO       Hospital Day: 2    Assessment and Recommendations   Wes Yanez is a 78 y.o. male with pmh of possible BPH on Flomax, hyperlipidemia, Alzheimer's dementia who presents with Weakness    Interval History: No acute overnight events.  Patient was seen and examined at bedside this morning.  He denies fever, chills, chest pain, shortness of breath, nausea, vomiting.  He continues to report bilateral leg weakness.    Plan:     Bilateral nonocclusive pulmonary embolism  -CT PE showed nonocclusive pulmonary emboli in the lower lobes  -Continue Eliquis 10 mg twice daily for 7 days followed by Eliquis 5 mg twice daily     Generalized weakness  Rule out CVA  -Patient presented with deficits consisting of generalized weakness, bilateral lower extremity weakness, tremors, inability to follow extraocular movements and commands on exam, although unsure if this is due to dementia  -Lipid panel within normal limits and A1c 5.6  -CT head 7/15 showed no acute intracranial abnormality, diffuse volume loss and mild chronic microvascular ischemic changes  -Echo pending  -CTA head neck and MRI brain pending  -Continue aspirin and increase atorvastatin to high dose 20 mg  -PT/OT following  -SLP eval   -MBSS pending    Bilateral lower extremity weakness  Urinary/fecal incontinence  -Repeat episodes of urinary/fecal incontinence.  Unclear whether true incontinence or if patient's lower extremity weakness contributes to him not being able to ambulate for urination and bowel movements  -CT abdomen pelvis showed moderate amount of stool in the colon  -Differentials for incontinence include constipation versus spine pathology, cauda equina syndrome, Guillain-[place] syndrome  -MRI

## 2024-07-16 NOTE — PROGRESS NOTES
tolerated treatment well;Treatment limited secondary to decreased cognition  Discharge Recommendations: Subacute/Skilled Nursing Facility  Equipment Needed: No  Other: defer      Plan   Occupational Therapy Plan  Times Per Week: 3-5x  Current Treatment Recommendations: Strengthening;Balance training;Functional mobility training;Endurance training;Cognitive reorientation;Safety education & training;Self-Care / ADL     Restrictions  Restrictions/Precautions  Restrictions/Precautions: Fall Risk;Up as Tolerated    Subjective   Subjective  Subjective: Pt agreeable to OT session this date  Pain: denies pain  Orientation  Overall Orientation Status: Impaired  Pain: denies pain at rest  Cognition  Overall Cognitive Status: Exceptions  Arousal/Alertness: Appears intact  Following Commands: Follows one step commands with increased time;Follows one step commands with repetition  Attention Span: Impaired  Memory: Impaired  Safety Judgement: Decreased awareness of need for safety;Decreased awareness of need for assistance;Impaired  Problem Solving: Impaired  Insights: Decreased awareness of deficits  Initiation: Requires cues for all  Sequencing: Requires cues for some        Objective    Vitals     Vitals:    07/16/24 0808   BP: 135/69   Pulse: 69   Resp: 16   Temp: 98.1 °F (36.7 °C)   SpO2: 94%       Bed Mobility Training  Bed Mobility Training: Yes  Supine to Sit: Moderate assistance  Sit to Supine:  (up in chair at end of session)  Balance  Sitting: Intact  Standing: Impaired  Standing - Static: Poor  Standing - Dynamic: Poor  Transfer Training  Transfer Training: Yes  Sit to Stand: Moderate assistance;Contact-guard assistance (mod A initially from EOB and from chair, improved to CGA with repetition from chair)  Stand to Sit: Minimum assistance  Gait  Gait Training: Yes  Overall Level of Assistance: Minimum assistance (HHA)  Distance (ft): 120 Feet  Assistive Device: Gait belt  Interventions: Tactile cues;Verbal cues  Gait

## 2024-07-16 NOTE — CONSULTS
Consult Placed     Who: ANSHUL SOTO   Date:7/16/2024  Time:1628     Electronically signed by Praful Braun on 7/16/2024 at 4:28 PM

## 2024-07-16 NOTE — PLAN OF CARE
Bedside swallow evaluation completed this date.    Mckenzie Cadena M.S. CCC-SLP  Speech-language pathologist  [phone]

## 2024-07-16 NOTE — PROGRESS NOTES
Patient admitted to room 367 from Ed.  Patient oriented to room, call light, bed rails, phone, lights and bathroom.  Patient instructed about the schedule of the day including: vital sign frequency, lab draws, possible tests, frequency of MD and staff rounds, including RN/MD rounding together at bedside, daily weights, and I &O's.  Patient instructed about prescribed diet, how to use 8MENU, and television.  bed alarm in place, patient aware of placement and reason. Bed locked, in lowest position, side rails up 2/4, call light within reach.  Will continue to monitor.

## 2024-07-16 NOTE — CARE COORDINATION
Chart reviewed day 1. Care provided by IM. Pt is from [facility] and has recs for SNF. [facility] is connected with [facility]. Referral has been made and they are reviewing. [facility] [phone] states they are able to accept. Son would prefer the pt to go to this SNF as it is connected with [facility]. Pt is on IVABX. Will continue to follow course for needs. Flaca Padilla RN

## 2024-07-16 NOTE — PROGRESS NOTES
4 Eyes Skin Assessment and Patient belongings     The patient is being assess for  Admission    I agree that 2 Nurses have performed a thorough Head to Toe Skin Assessment on the patient. ALL assessment sites listed below have been assessed.       Areas assessed by both nurses:   [x]   Head, Face, and Ears   [x]   Shoulders, Back, and Chest  [x]   Arms, Elbows, and Hands   [x]   Coccyx, Sacrum, and IschIum  [x]   Legs, Feet, and Heels        Does the Patient have Skin Breakdown?  No         Amari Prevention initiated:  Yes   Wound Care Orders initiated:  No      [facility] nurse consulted for Pressure Injury (Stage 3,4, Unstageable, DTI, NWPT, and Complex wounds), New and Established Ostomies:  No      I agree that 2 Nurses have reviewed patient belongings with the patient/family and documented in the flowsheet upon admission or transfer to the unit.     Belongings  Dental Appliances: None  Vision - Corrective Lenses: Eyeglasses, Sent home  Hearing Aid: None  Clothing: Shirt, Socks, Pants, Footwear, Belt, Undergarments, At bedside  Jewelry: None  Electronic Devices: None  Weapons (Notify Protective Services/Security): None  Home Medications: None  Valuables Given To: Patient (at bedside)  Provide Name(s) of Who Valuable(s) Were Given To: Wes Yanez       Nurse 1 eSignature: Electronically signed by JOHANNA TURNER RN on 7/16/24 at 2:22 AM EDT    **SHARE this note so that the co-signing nurse is able to place an eSignature**    Nurse 2 eSignature: Electronically signed by Poly Velazquez RN on 7/16/24 at 2:28 AM EDT

## 2024-07-16 NOTE — PROGRESS NOTES
Speech Language Pathology  Clinical Bedside Swallow Assessment  Facility/Department: [facility] - MED SURG        Recommendations:  Diet recommendation: IDDSI 6 Soft and Bite Sized Solids; IDDSI 0 Thin Liquids; Meds crushed in puree as able  Instrumentation: MBSS is recommended to further assess oropharyngeal structures and functions   Risk management: upright for all intake, stay upright for at least 30 mins after intake, small bites/sips, close supervision, oral care 2-3x/day to reduce adverse affects in the event of aspiration, increase physical mobility as able, alternate bites/sips, slow rate of intake, general GERD precautions, general aspiration precautions, and hold PO and contact SLP if s/s of aspiration or worsening respiratory status develop.    Of note, pt quite impulsive with PO intake despite cues; would benefit from close supervision during meals / reminders for use of safe swallow strategies.      NAME:Wes Yanez  : 1945 (78 y.o.)   MRN: [id number]  ROOM: [address] [place] [address]  ADMISSION DATE: 7/15/2024  PATIENT DIAGNOSIS(ES): Weakness [R53.1]  Generalized weakness [R53.1]  Failure to thrive in adult [R62.7]  Stroke-like symptoms [R29.90]  Acute pulmonary embolism without acute cor pulmonale, unspecified pulmonary embolism type (HCC) [I26.99]  Chief Complaint   Patient presents with    Failure To Thrive     Patient to the ED for unable to ambulate, failure to thrive, and not really able to take care of himself. Son at bedside states hx of dementia but has been doing good up until the last week. Reports patient isn't able to walk, lives in an independent living place, and may need more assistance.      Patient Active Problem List    Diagnosis Date Noted    Weakness 07/15/2024    Mixed hyperlipidemia 10/17/2023    External hemorrhoid 2021     Past Medical History:   Diagnosis Date    Elevated blood pressure reading without diagnosis of hypertension      Past Surgical History:   Procedure  Referral  Wes Yanez was referred for a bedside swallow evaluation to assess the efficiency of their swallow function, identify signs and symptoms of aspiration and make recommendations regarding safe dietary consistencies, effective compensatory strategies, and safe eating environment.    Assessment    Medical record review/interview: Per MD H&P on 7/15/24: \"Chief Complaint: Weakness     Wes Yanez is a 78 y.o. male with pmh of possible BPH on Flomax, hyperlipidemia, Alzheimer's dementia who presents with generalized weakness.  Patient reports that he had an episode of urine and fecal incontinence today.  It is unclear whether patient's incontinence is due to his bilateral leg weakness causing him to be unable to get out of bed or whether it is true incontinence.  Patient's son reports that patient has been active and normally functioning up until this past weekend.  Patient was previously able to walk around and exercise on his own as well as play golf.  Son states that patient has not been able to ambulate and has not been drinking and eating well.  He reports another episode 1 and half months ago of fecal incontinence.  Patient's son reports new onset bilateral hand tremor and bilateral lower extremity shaking.  Patient's son is not aware of any fever, chills, chest pain, shortness of breath that patient has been complaining of.  ED workup showed WBC 14.1, sodium 132, troponin 36 with repeat 51, normal lactate, normal CK.  POCT UA was unremarkable with repeat UA pending.  Chest x-ray showed streaky airspace opacity of the left lung base possibly representing atelectasis versus developing pneumonia.  CT PE showed nonocclusive pulmonary emboli in the lower lobes.  CT abdomen pelvis showed moderate amount of stool in the colon. Patient was admitted for weakness, CVA rule out, pneumonia management\".        Predisposing dysphagia risk factors: Cognitive Deficit and Age  Clinical signs of possible chronic

## 2024-07-17 ENCOUNTER — APPOINTMENT (OUTPATIENT)
Age: 79
End: 2024-07-17
Payer: MEDICARE

## 2024-07-17 LAB
ANION GAP SERPL CALCULATED.3IONS-SCNC: 10 MMOL/L (ref 3–16)
BASOPHILS # BLD: 0 K/UL (ref 0–0.2)
BASOPHILS NFR BLD: 0.6 %
BUN SERPL-MCNC: 18 MG/DL (ref 7–20)
CALCIUM SERPL-MCNC: 8.1 MG/DL (ref 8.3–10.6)
CHLORIDE SERPL-SCNC: 101 MMOL/L (ref 99–110)
CO2 SERPL-SCNC: 24 MMOL/L (ref 21–32)
CREAT SERPL-MCNC: <0.5 MG/DL (ref 0.8–1.3)
DEPRECATED RDW RBC AUTO: 14.3 % (ref 12.4–15.4)
ECHO AO ASC DIAM: 3.1 CM
ECHO AO ASCENDING AORTA INDEX: 1.6 CM/M2
ECHO AO ROOT DIAM: 3.3 CM
ECHO AO ROOT INDEX: 1.7 CM/M2
ECHO AV AREA PEAK VELOCITY: 2.7 CM2
ECHO AV AREA VTI: 2.8 CM2
ECHO AV AREA/BSA PEAK VELOCITY: 1.4 CM2/M2
ECHO AV AREA/BSA VTI: 1.4 CM2/M2
ECHO AV MEAN GRADIENT: 6 MMHG
ECHO AV MEAN VELOCITY: 1.1 M/S
ECHO AV PEAK GRADIENT: 10 MMHG
ECHO AV PEAK VELOCITY: 1.6 M/S
ECHO AV VELOCITY RATIO: 0.81
ECHO AV VTI: 32.6 CM
ECHO BSA: 1.92 M2
ECHO EST RA PRESSURE: 8 MMHG
ECHO LA AREA 2C: 21.7 CM2
ECHO LA AREA 4C: 18.2 CM2
ECHO LA DIAMETER INDEX: 1.96 CM/M2
ECHO LA DIAMETER: 3.8 CM
ECHO LA MAJOR AXIS: 6.1 CM
ECHO LA MINOR AXIS: 5.1 CM
ECHO LA TO AORTIC ROOT RATIO: 1.15
ECHO LA VOL BP: 62 ML (ref 18–58)
ECHO LA VOL MOD A2C: 75 ML (ref 18–58)
ECHO LA VOL MOD A4C: 44 ML (ref 18–58)
ECHO LA VOL/BSA BIPLANE: 32 ML/M2 (ref 16–34)
ECHO LA VOLUME INDEX MOD A2C: 39 ML/M2 (ref 16–34)
ECHO LA VOLUME INDEX MOD A4C: 23 ML/M2 (ref 16–34)
ECHO LV E' LATERAL VELOCITY: 10 CM/S
ECHO LV E' SEPTAL VELOCITY: 8 CM/S
ECHO LV EDV A2C: 71 ML
ECHO LV EDV A4C: 79 ML
ECHO LV EDV INDEX A4C: 41 ML/M2
ECHO LV EDV NDEX A2C: 37 ML/M2
ECHO LV EJECTION FRACTION A2C: 59 %
ECHO LV EJECTION FRACTION A4C: 65 %
ECHO LV EJECTION FRACTION BIPLANE: 61 % (ref 55–100)
ECHO LV ESV A2C: 29 ML
ECHO LV ESV A4C: 28 ML
ECHO LV ESV INDEX A2C: 15 ML/M2
ECHO LV ESV INDEX A4C: 14 ML/M2
ECHO LV FRACTIONAL SHORTENING: 23 % (ref 28–44)
ECHO LV INTERNAL DIMENSION DIASTOLE INDEX: 2.68 CM/M2
ECHO LV INTERNAL DIMENSION DIASTOLIC: 5.2 CM (ref 4.2–5.9)
ECHO LV INTERNAL DIMENSION SYSTOLIC INDEX: 2.06 CM/M2
ECHO LV INTERNAL DIMENSION SYSTOLIC: 4 CM
ECHO LV IVSD: 0.9 CM (ref 0.6–1)
ECHO LV MASS 2D: 169 G (ref 88–224)
ECHO LV MASS INDEX 2D: 87.1 G/M2 (ref 49–115)
ECHO LV POSTERIOR WALL DIASTOLIC: 0.9 CM (ref 0.6–1)
ECHO LV RELATIVE WALL THICKNESS RATIO: 0.35
ECHO LVOT AREA: 3.1 CM2
ECHO LVOT AV VTI INDEX: 0.89
ECHO LVOT DIAM: 2 CM
ECHO LVOT MEAN GRADIENT: 4 MMHG
ECHO LVOT PEAK GRADIENT: 7 MMHG
ECHO LVOT PEAK VELOCITY: 1.3 M/S
ECHO LVOT STROKE VOLUME INDEX: 47.1 ML/M2
ECHO LVOT SV: 91.4 ML
ECHO LVOT VTI: 29.1 CM
ECHO MV A VELOCITY: 0.64 M/S
ECHO MV E DECELERATION TIME (DT): 222 MS
ECHO MV E VELOCITY: 0.72 M/S
ECHO MV E/A RATIO: 1.13
ECHO MV E/E' LATERAL: 7.2
ECHO MV E/E' RATIO (AVERAGED): 8.1
ECHO MV E/E' SEPTAL: 9
ECHO RA AREA 4C: 12 CM2
ECHO RA END SYSTOLIC VOLUME APICAL 4 CHAMBER INDEX BSA: 12 ML/M2
ECHO RA VOLUME: 23 ML
ECHO RIGHT VENTRICULAR SYSTOLIC PRESSURE (RVSP): 21 MMHG
ECHO RV FREE WALL PEAK S': 10 CM/S
ECHO RV TAPSE: 2 CM (ref 1.7–?)
ECHO TV REGURGITANT MAX VELOCITY: 1.81 M/S
ECHO TV REGURGITANT PEAK GRADIENT: 13 MMHG
EOSINOPHIL # BLD: 0.2 K/UL (ref 0–0.6)
EOSINOPHIL NFR BLD: 2.3 %
GFR SERPLBLD CREATININE-BSD FMLA CKD-EPI: >90 ML/MIN/{1.73_M2}
GLUCOSE SERPL-MCNC: 89 MG/DL (ref 70–99)
HCT VFR BLD AUTO: 35.6 % (ref 40.5–52.5)
HGB BLD-MCNC: 12.1 G/DL (ref 13.5–17.5)
LYMPHOCYTES # BLD: 1.1 K/UL (ref 1–5.1)
LYMPHOCYTES NFR BLD: 15 %
MCH RBC QN AUTO: 30.5 PG (ref 26–34)
MCHC RBC AUTO-ENTMCNC: 34.1 G/DL (ref 31–36)
MCV RBC AUTO: 89.4 FL (ref 80–100)
MONOCYTES # BLD: 0.6 K/UL (ref 0–1.3)
MONOCYTES NFR BLD: 8.5 %
NEUTROPHILS # BLD: 5.3 K/UL (ref 1.7–7.7)
NEUTROPHILS NFR BLD: 73.6 %
PLATELET # BLD AUTO: 269 K/UL (ref 135–450)
PMV BLD AUTO: 7.1 FL (ref 5–10.5)
POTASSIUM SERPL-SCNC: 3.7 MMOL/L (ref 3.5–5.1)
RBC # BLD AUTO: 3.98 M/UL (ref 4.2–5.9)
SODIUM SERPL-SCNC: 135 MMOL/L (ref 136–145)
WBC # BLD AUTO: 7.2 K/UL (ref 4–11)

## 2024-07-17 PROCEDURE — 6370000000 HC RX 637 (ALT 250 FOR IP)

## 2024-07-17 PROCEDURE — 1200000000 HC SEMI PRIVATE

## 2024-07-17 PROCEDURE — 6360000002 HC RX W HCPCS

## 2024-07-17 PROCEDURE — 97535 SELF CARE MNGMENT TRAINING: CPT

## 2024-07-17 PROCEDURE — 36415 COLL VENOUS BLD VENIPUNCTURE: CPT

## 2024-07-17 PROCEDURE — 6360000004 HC RX CONTRAST MEDICATION

## 2024-07-17 PROCEDURE — 2580000003 HC RX 258

## 2024-07-17 PROCEDURE — 93306 TTE W/DOPPLER COMPLETE: CPT | Performed by: INTERNAL MEDICINE

## 2024-07-17 PROCEDURE — 80048 BASIC METABOLIC PNL TOTAL CA: CPT

## 2024-07-17 PROCEDURE — 97530 THERAPEUTIC ACTIVITIES: CPT

## 2024-07-17 PROCEDURE — 85025 COMPLETE CBC W/AUTO DIFF WBC: CPT

## 2024-07-17 PROCEDURE — 92526 ORAL FUNCTION THERAPY: CPT

## 2024-07-17 PROCEDURE — C8929 TTE W OR WO FOL WCON,DOPPLER: HCPCS

## 2024-07-17 RX ADMIN — PERFLUTREN 1.5 ML: 6.52 INJECTION, SUSPENSION INTRAVENOUS at 14:30

## 2024-07-17 RX ADMIN — CEFTRIAXONE SODIUM 1000 MG: 1 INJECTION, POWDER, FOR SOLUTION INTRAMUSCULAR; INTRAVENOUS at 17:52

## 2024-07-17 RX ADMIN — APIXABAN 10 MG: 5 TABLET, FILM COATED ORAL at 08:49

## 2024-07-17 RX ADMIN — CITALOPRAM HYDROBROMIDE 10 MG: 20 TABLET ORAL at 08:50

## 2024-07-17 RX ADMIN — ATORVASTATIN CALCIUM 20 MG: 10 TABLET, FILM COATED ORAL at 08:49

## 2024-07-17 RX ADMIN — ASPIRIN 81 MG 81 MG: 81 TABLET ORAL at 08:49

## 2024-07-17 RX ADMIN — Medication 10 ML: at 14:30

## 2024-07-17 RX ADMIN — SODIUM CHLORIDE, PRESERVATIVE FREE 10 ML: 5 INJECTION INTRAVENOUS at 21:41

## 2024-07-17 RX ADMIN — AZITHROMYCIN 250 MG: 250 TABLET, FILM COATED ORAL at 08:50

## 2024-07-17 RX ADMIN — SODIUM CHLORIDE, PRESERVATIVE FREE 10 ML: 5 INJECTION INTRAVENOUS at 08:50

## 2024-07-17 RX ADMIN — APIXABAN 10 MG: 5 TABLET, FILM COATED ORAL at 21:40

## 2024-07-17 NOTE — CONSULTS
CONSULTATION  Wes Yanez is a 78 y.o. male asked to see us in consultation by  Michelle Lutz PA & Ha Horne DO for evaluation of dysphagia.      Mr. Yanez is a 78 year old male with past medical history of BPH, HLD, alzheimer's dementia who presented to the ER with weakness two days ago.  He has reportedly been functioning normally until this past weekend when he began having trouble walking, stopped eating and drinking and became incontinent of urine and stool.  On admission he was diagnosed with pneumonia. Also found to have bilateral PEs, now on eliquis and stool impaction.  CT head, CTA head/ neck and MRI brain showed no acute pathology.     Although he has no complaints of dysphagia (likely in part due to cognitive status), speech therapy was consulted. MBS was performed that reports \"apparent reduced cricopharyngeal opening\". No aspiration noted.  He was cleared for pureed diet.  No family at bedside.    No prior EGDs in epic.  He had a colonoscopy in 2012 with findings of hyperplastic polyps and TAs.          Medications Prior to Admission: tamsulosin (FLOMAX) 0.4 MG capsule, Take 1 capsule by mouth daily  citalopram (CELEXA) 10 MG tablet, Take 1 tablet by mouth daily  atorvastatin (LIPITOR) 10 MG tablet, TAKE 1 TABLET BY MOUTH DAILY.    Medication:   aspirin  81 mg Oral Daily    atorvastatin  20 mg Oral Daily    citalopram  10 mg Oral Daily    sodium chloride flush  5-40 mL IntraVENous 2 times per day    apixaban  10 mg Oral BID    Followed by    [START ON 7/22/2024] apixaban  5 mg Oral BID    cefTRIAXone (ROCEPHIN) IV  1,000 mg IntraVENous Q24H    azithromycin  250 mg Oral Daily    [Held by provider] tamsulosin  0.4 mg Oral Daily      sodium chloride 25 mL/hr at 07/15/24 2100       Allergies:  Allergies   Allergen Reactions    Tramadol Hcl (Er Biphasic) Other (See Comments)     Causes more pain that he  not hesitate to call with questions or concerns.     1.  The patient indicates understanding of these issues and agrees with the plan.  2.  I reviewed the patient's medical information and medical history.   3.  I have reviewed the past medical, family, and social history sections including the medications and allergies listed in the above medical record.        Electronically signed by: KASSI Snyder 7/17/2024 7:46 AM           (Office) [phone]  (Fax) [phone]  Available via perfect serve

## 2024-07-17 NOTE — PLAN OF CARE
Problem: Discharge Planning  Goal: Discharge to home or other facility with appropriate resources  7/17/2024 0047 by Kitty Jones RN  Outcome: Progressing  7/17/2024 0025 by Kitty Jones RN  Outcome: Progressing  Flowsheets (Taken 7/16/2024 2100)  Discharge to home or other facility with appropriate resources: Identify barriers to discharge with patient and caregiver     Problem: Safety - Adult  Goal: Free from fall injury  7/17/2024 0047 by Kitty Jones RN  Outcome: Progressing  7/17/2024 0025 by Kitty Jones RN  Outcome: Progressing     Problem: Skin/Tissue Integrity  Goal: Absence of new skin breakdown  Description: 1.  Monitor for areas of redness and/or skin breakdown  2.  Assess vascular access sites hourly  3.  Every 4-6 hours minimum:  Change oxygen saturation probe site  4.  Every 4-6 hours:  If on nasal continuous positive airway pressure, respiratory therapy assess nares and determine need for appliance change or resting period.  7/17/2024 0047 by Kitty Jones RN  Outcome: Progressing  7/17/2024 0025 by Kitty Jones RN  Outcome: Progressing     Problem: Confusion  Goal: Confusion, delirium, dementia, or psychosis is improved or at baseline  Description: INTERVENTIONS:  1. Assess for possible contributors to thought disturbance, including medications, impaired vision or hearing, underlying metabolic abnormalities, dehydration, psychiatric diagnoses, and notify attending LIP  2. [place] high risk fall precautions, as indicated  3. Provide frequent short contacts to provide reality reorientation, refocusing and direction  4. Decrease environmental stimuli, including noise as appropriate  5. Monitor and intervene to maintain adequate nutrition, hydration, elimination, sleep and activity  6. If unable to ensure safety without constant attention obtain sitter and review sitter guidelines with assigned personnel  7. Initiate Psychosocial CNS and Spiritual Care consult, as indicated  7/17/2024 0047 by Kitty Jones

## 2024-07-17 NOTE — PLAN OF CARE
Problem: Discharge Planning  Goal: Discharge to home or other facility with appropriate resources  7/17/2024 0025 by Kitty Jones RN  Outcome: Progressing  Flowsheets (Taken 7/16/2024 2100)  Discharge to home or other facility with appropriate resources: Identify barriers to discharge with patient and caregiver  7/16/2024 1034 by Nicky Gates RN  Outcome: Progressing     Problem: Safety - Adult  Goal: Free from fall injury  7/17/2024 0025 by Kitty Jones RN  Outcome: Progressing  7/16/2024 1034 by Nicky Gates RN  Outcome: Progressing     Problem: Skin/Tissue Integrity  Goal: Absence of new skin breakdown  Description: 1.  Monitor for areas of redness and/or skin breakdown  2.  Assess vascular access sites hourly  3.  Every 4-6 hours minimum:  Change oxygen saturation probe site  4.  Every 4-6 hours:  If on nasal continuous positive airway pressure, respiratory therapy assess nares and determine need for appliance change or resting period.  7/17/2024 0025 by Kitty Jones RN  Outcome: Progressing  7/16/2024 1034 by Nicky Gates RN  Outcome: Progressing     Problem: Confusion  Goal: Confusion, delirium, dementia, or psychosis is improved or at baseline  Description: INTERVENTIONS:  1. Assess for possible contributors to thought disturbance, including medications, impaired vision or hearing, underlying metabolic abnormalities, dehydration, psychiatric diagnoses, and notify attending LIP  2. [place] high risk fall precautions, as indicated  3. Provide frequent short contacts to provide reality reorientation, refocusing and direction  4. Decrease environmental stimuli, including noise as appropriate  5. Monitor and intervene to maintain adequate nutrition, hydration, elimination, sleep and activity  6. If unable to ensure safety without constant attention obtain sitter and review sitter guidelines with assigned personnel  7. Initiate Psychosocial CNS and Spiritual Care consult, as

## 2024-07-17 NOTE — PLAN OF CARE
Problem: Confusion  Goal: Confusion, delirium, dementia, or psychosis is improved or at baseline  Description: INTERVENTIONS:  1. Assess for possible contributors to thought disturbance, including medications, impaired vision or hearing, underlying metabolic abnormalities, dehydration, psychiatric diagnoses, and notify attending LIP  2. [place] high risk fall precautions, as indicated  3. Provide frequent short contacts to provide reality reorientation, refocusing and direction  4. Decrease environmental stimuli, including noise as appropriate  5. Monitor and intervene to maintain adequate nutrition, hydration, elimination, sleep and activity  6. If unable to ensure safety without constant attention obtain sitter and review sitter guidelines with assigned personnel  7. Initiate Psychosocial CNS and Spiritual Care consult, as indicated  Outcome: Progressing  Flowsheets (Taken 7/17/2024 1606)  Effect of thought disturbance (confusion, delirium, dementia, or psychosis) are managed with adequate functional status:   Assess for contributors to thought disturbance, including medications, impaired vision or hearing, underlying metabolic abnormalities, dehydration, psychiatric diagnoses, notify LIP   [place] high risk fall precautions, as indicated   Provide frequent short contacts to provide reality reorientation, refocusing and direction   Decrease environmental stimuli, including noise as appropriate   Monitor and intervene to maintain adequate nutrition, hydration, elimination, sleep and activity

## 2024-07-17 NOTE — PROGRESS NOTES
Occupational Therapy  Facility/Department: [facility] B3 - MED SURG  Daily Treatment Note  NAME: Wes Yanez  : 1945  MRN: [id number]    Date of Service: 2024    Discharge Recommendations:  Subacute/Skilled Nursing Facility  OT Equipment Recommendations  Equipment Needed: No  Other: defer    Therapy discharge recommendations are subject to collaboration from the patient’s interdisciplinary healthcare team, including MD and case management recommendations.     Barriers to Home Discharge:   [] Steps to access home entry or bed/bath:   [x] Unable to transfer, ambulate, or propel wheelchair household distances without assist   [x] Limited available assist at home upon discharge    [] Patient or family requests d/c to post-acute facility    [x] Poor cognition/safety awareness for d/c to home alone    [] Unable to maintain ordered weight bearing status    [] Patient with salient signs of long-standing immobility   [x] Decreased independence with ADLs   [x] Increased risk for falls   [] Other:     If pt is unable to be seen after this session, please let this note serve as discharge summary.  Please see case management note for discharge disposition.  Thank you.  Patient Diagnosis(es): The primary encounter diagnosis was Acute pulmonary embolism without acute cor pulmonale, unspecified pulmonary embolism type (HCC). Diagnoses of Generalized weakness, Failure to thrive in adult, and Stroke-like symptoms were also pertinent to this visit.     Assessment    Assessment: Pt seen for follow-up OT session this date. Pt agreeable and pleasantly confused throughout session. Pt completed bed mobility this date with improvement, but continued to require assistance for sit>stand at EOB. Pt once standing completed transfer to bathroom sink. Pt stood to complete ADLs, but required verbal cues for sequencing. Pt completed further transfer into hallway with CGA and then returned to room chair. Pt would continue to benefit from  peds residnet

## 2024-07-17 NOTE — PROGRESS NOTES
parapelvic cysts. GI: No small bowel dilation.  No colonic wall thickening.  No large mass. The stomach is unremarkable in appearance.Moderate amount of stool in the colon. Mesentery: No enlarged lymphadenopathy. No free fluid. No free gas. Aorta: Aorta is of normal size.  Negative for dissection.  IVC is unremarkable.Celiac axis and SMA are patent. Portal vein is patent. PELVIS GI: No small bowel dilation.  No colonic wall thickening.  No enlarged lymphadenopathy. no free fluid in the pelvis. : The bladder is unremarkable in appearance.  No large mass.  Prostate is unremarkable. Osseous: Osteoarthritic change of the SI joints.  Heterotopic ossification at the right acetabulum.  Advanced facet arthropathy at the lower lumbar spine. Bridging osteophytes at the SI joints.     CTA CHEST: 1. Nonocclusive pulmonary emboli in the lower lobes. 2. Ectasia of the ascending thoracic aorta. 3. Globular cardiomegaly but no other evidence for right heart strain. CT ABDOMEN AND PELVIS Moderate amount of stool in the colon     CT ABDOMEN PELVIS W IV CONTRAST Additional Contrast? None    Result Date: 7/15/2024  EXAMINATION: CTA OF THE CHEST; CT OF THE ABDOMEN AND PELVIS WITH CONTRAST 7/15/2024 1:32 pm TECHNIQUE: CTA of the chest was performed after the administration of intravenous contrast.  Multiplanar reformatted images are provided for review.  MIP images are provided for review. Automated exposure control, iterative reconstruction, and/or weight based adjustment of the mA/kV was utilized to reduce the radiation dose to as low as reasonably achievable.; CT of the abdomen and pelvis was performed with the administration of intravenous contrast. Multiplanar reformatted images are provided for review. Automated exposure control, iterative reconstruction, and/or weight based adjustment of the mA/kV was utilized to reduce the radiation dose to as low as reasonably achievable. COMPARISON: None. HISTORY: ORDERING SYSTEM PROVIDED  HISTORY: abnormal chest xray, weak, incontinent, confused TECHNOLOGIST PROVIDED HISTORY: Reason for exam:->abnormal chest xray, weak, incontinent, confused Additional Contrast?->1 Reason for Exam: abnormal cxr, weakness, confusion Additional signs and symptoms: pt has alzheimers poor historian CTA CHEST Pulmonary Arteries: Not occlusive pulmonary emboli in the right lower lobe and multiple subsegmental and segmental arteries.  Main pulmonary artery is of normal size.  Tiny subsegmental emboli in the left lower lobe. Mediastinum: No evidence of mediastinal lymphadenopathy.  The heart is enlarged.  Globular cardiomegaly..  Ascending thoracic aorta is borderline aneurysmal.  Anterior to posterior dimension is 3.8 cm.  Transverse dimension is 4 cm.  Negative for dissection.. Lungs/pleura: Respiratory motion.  Bibasilar atelectasis.  No consolidation. Trachea and bronchi are patent. soft Tissues/Bones: Spondylosis.  Osteoarthritic change of the sternoclavicular joints.  Osteoarthritic changes at the glenohumeral joints. CT ABDOMEN AND PELVIS Liver: Liver is normal density. No enhancing masses.  Normal enhancement of the intrahepatic vasculature. Spleen:  Normal size.  No enhancing masses Pancreas: No enhancing masses.  No ductal dilation. No adjacent fatty stranding. Gallbladder no calcified stones or sludge.  No pericholecystic fluid.  No wall thickening. Bile ducts: No biliary ductal dilation Adrenals: The adrenal glands are unremarkable Kidneys: Kidneys are normal in appearance.  No hydronephrosis.  No enhancing masses. Norenal stones.  Multiple simple appearing parapelvic cysts. GI: No small bowel dilation.  No colonic wall thickening.  No large mass. The stomach is unremarkable in appearance.Moderate amount of stool in the colon. Mesentery: No enlarged lymphadenopathy. No free fluid. No free gas. Aorta: Aorta is of normal size.  Negative for dissection.  IVC is unremarkable.Celiac axis and SMA are patent. Portal

## 2024-07-17 NOTE — PROGRESS NOTES
Puree: no anterior bolus loss, suspect functional A-P bolus transit, oral clearance grossly WFL, and no clinical s/s of aspiration    Impressions:    Pt seen upright in bed, alert and pleasantly confused. Pt's son at bedside. SLP reviewed results and recommendations from MBS completed on 7/16 including compensatory swallow strategies. Caregiver verbalized understanding of results and recommendations.   Trials of puree and thin liquids were provided to assess swallow function. SLP fed pt with puree, hand under hand assist with thin cup. Adequate labial seal, timely bolus manipulation, and complete oral clearance. No immediate overt s/s of aspiration assessed across trials. Slight wet vocal quality x1 following 2-3 consecutive tsp of puree which improved to baseline after cued throat clear/cough.   Recommend continuation of puree solids, thin liquids via small single sips, and meds crushed in puree as able. SLP to continue to monitor pt for diet tolerance. If clinical s/s of aspiration and/or worsening respiratory status develops, recommend holding PO and contacting SLP         Dysphagia Goals:  Long-term Goals  Timeframe for Long-term Goals: 7 days (7/23/24)  Goal 1: The pt will tolerate safest and least restrictive diet without clinical s/s of aspiration or change in respiratory status  7/17/2024: Goal addressed, see above. Ongoing, progressing.      Short-term Goals  Timeframe for Short-term Goals: 5 days (7/21/24)  Goal 1: The patient will tolerate recommended diet without observed clinical signs of aspiration  7/17/2024: Goal addressed, see above. Ongoing, progressing.   Goal 2: The patient/caregiver will demonstrate understanding of compensatory strategies for improved swallowing safety  7/17/2024: Goal addressed, see above. Ongoing, progressing.   Goal 3: The patient will tolerate instrumental swallowing procedure  7/17/2024: Goal addressed, see above. Ongoing, progressing.   Speech/Language/Cog Goals: N/A

## 2024-07-18 LAB
ANION GAP SERPL CALCULATED.3IONS-SCNC: 10 MMOL/L (ref 3–16)
BASOPHILS # BLD: 0.1 K/UL (ref 0–0.2)
BASOPHILS NFR BLD: 0.8 %
BUN SERPL-MCNC: 15 MG/DL (ref 7–20)
CALCIUM SERPL-MCNC: 8.3 MG/DL (ref 8.3–10.6)
CHLORIDE SERPL-SCNC: 101 MMOL/L (ref 99–110)
CO2 SERPL-SCNC: 25 MMOL/L (ref 21–32)
CREAT SERPL-MCNC: 0.6 MG/DL (ref 0.8–1.3)
DEPRECATED RDW RBC AUTO: 14.4 % (ref 12.4–15.4)
EOSINOPHIL # BLD: 0.2 K/UL (ref 0–0.6)
EOSINOPHIL NFR BLD: 3 %
GFR SERPLBLD CREATININE-BSD FMLA CKD-EPI: >90 ML/MIN/{1.73_M2}
GLUCOSE SERPL-MCNC: 88 MG/DL (ref 70–99)
HCT VFR BLD AUTO: 37.2 % (ref 40.5–52.5)
HGB BLD-MCNC: 12.6 G/DL (ref 13.5–17.5)
LYMPHOCYTES # BLD: 1.5 K/UL (ref 1–5.1)
LYMPHOCYTES NFR BLD: 17.4 %
MCH RBC QN AUTO: 30 PG (ref 26–34)
MCHC RBC AUTO-ENTMCNC: 33.8 G/DL (ref 31–36)
MCV RBC AUTO: 88.9 FL (ref 80–100)
MONOCYTES # BLD: 0.7 K/UL (ref 0–1.3)
MONOCYTES NFR BLD: 8.1 %
NEUTROPHILS # BLD: 5.9 K/UL (ref 1.7–7.7)
NEUTROPHILS NFR BLD: 70.7 %
PLATELET # BLD AUTO: 298 K/UL (ref 135–450)
PMV BLD AUTO: 7.1 FL (ref 5–10.5)
POTASSIUM SERPL-SCNC: 3.8 MMOL/L (ref 3.5–5.1)
RBC # BLD AUTO: 4.18 M/UL (ref 4.2–5.9)
SODIUM SERPL-SCNC: 136 MMOL/L (ref 136–145)
WBC # BLD AUTO: 8.3 K/UL (ref 4–11)

## 2024-07-18 PROCEDURE — 85025 COMPLETE CBC W/AUTO DIFF WBC: CPT

## 2024-07-18 PROCEDURE — 6370000000 HC RX 637 (ALT 250 FOR IP)

## 2024-07-18 PROCEDURE — 36415 COLL VENOUS BLD VENIPUNCTURE: CPT

## 2024-07-18 PROCEDURE — 2580000003 HC RX 258

## 2024-07-18 PROCEDURE — 1200000000 HC SEMI PRIVATE

## 2024-07-18 PROCEDURE — 6360000002 HC RX W HCPCS

## 2024-07-18 PROCEDURE — 80048 BASIC METABOLIC PNL TOTAL CA: CPT

## 2024-07-18 RX ADMIN — ASPIRIN 81 MG 81 MG: 81 TABLET ORAL at 11:37

## 2024-07-18 RX ADMIN — ATORVASTATIN CALCIUM 20 MG: 10 TABLET, FILM COATED ORAL at 11:37

## 2024-07-18 RX ADMIN — SODIUM CHLORIDE 25 ML: 9 INJECTION, SOLUTION INTRAVENOUS at 19:41

## 2024-07-18 RX ADMIN — APIXABAN 10 MG: 5 TABLET, FILM COATED ORAL at 19:45

## 2024-07-18 RX ADMIN — SODIUM CHLORIDE, PRESERVATIVE FREE 10 ML: 5 INJECTION INTRAVENOUS at 11:41

## 2024-07-18 RX ADMIN — AZITHROMYCIN 250 MG: 250 TABLET, FILM COATED ORAL at 11:37

## 2024-07-18 RX ADMIN — CITALOPRAM HYDROBROMIDE 10 MG: 20 TABLET ORAL at 11:37

## 2024-07-18 RX ADMIN — CEFTRIAXONE SODIUM 1000 MG: 1 INJECTION, POWDER, FOR SOLUTION INTRAMUSCULAR; INTRAVENOUS at 19:42

## 2024-07-18 RX ADMIN — SODIUM CHLORIDE, PRESERVATIVE FREE 10 ML: 5 INJECTION INTRAVENOUS at 19:47

## 2024-07-18 RX ADMIN — APIXABAN 10 MG: 5 TABLET, FILM COATED ORAL at 11:37

## 2024-07-18 NOTE — PROGRESS NOTES
PROGRESS NOTE    HPI: Wes Yanez is a(n)78 y.o. male admitted for work-up and treatment for Weakness [R53.1]  Generalized weakness [R53.1]  Failure to thrive in adult [R62.7]  Stroke-like symptoms [R29.90]  Acute pulmonary embolism without acute cor pulmonale, unspecified pulmonary embolism type (HCC) [I26.99].     We are following for dysphagia.    Subjective:     No acute events overnight.  No complaints from patient today. Smiling - answers yes and no questions.      Objective:     I/O last 3 completed shifts:  In: 1202 [P.O.:1192; I.V.:10]  Out: 0       /78   Pulse 65   Temp 97.7 °F (36.5 °C) (Oral)   Resp 18   Ht 1.829 m (6')   Wt 98.8 kg (217 lb 12.8 oz)   SpO2 97%   BMI 29.54 kg/m²     Physical Exam:  HEENT: anicteric sclera, oropharyngeal membranes pink and moist.  Cor: RRR  Lungs: non-labored, no respiratory distress  Abdomen: soft, NT. No ascites.  No hepatomegaly or splenomegaly  Extremities: no edema  Neuro: alert and oriented to person.      Results:   Lab Results   Component Value Date    ALT 12 07/15/2024    AST 17 07/15/2024    ALKPHOS 100 07/15/2024    BILIDIR <0.2 01/04/2019     Lab Results   Component Value Date    WBC 8.3 07/18/2024    HGB 12.6 (L) 07/18/2024    HCT 37.2 (L) 07/18/2024    MCV 88.9 07/18/2024     07/18/2024     BUN/Cr/glu/ALT/AST/amyl/lip:  15/0.6/--/--/--/--/-- (07/18 0551)  FL MODIFIED BARIUM SWALLOW W VIDEO    Result Date: 7/17/2024  1. Visualization of residual contrast material within the hypopharynx following initial swallows throughout the examination. 2. No evidence of deep penetration of the larynx or aspiration. Please see separate speech pathology report for full discussion of findings and recommendations.     CTA HEAD NECK W CONTRAST    Result Date: 7/16/2024  No flow limiting stenosis or large vessel occlusion detected within the head or neck.     MRI BRAIN WO CONTRAST    Result Date:

## 2024-07-18 NOTE — PLAN OF CARE
Problem: Discharge Planning  Goal: Discharge to home or other facility with appropriate resources  Outcome: Progressing     Problem: Safety - Adult  Goal: Free from fall injury  Outcome: Progressing     Problem: Skin/Tissue Integrity  Goal: Absence of new skin breakdown  Description: 1.  Monitor for areas of redness and/or skin breakdown  Outcome: Progressing

## 2024-07-18 NOTE — CARE COORDINATION
Chart reviewed day 3. Care provided by GI and IM. Pt is from [facility] and has recs for SNF. He has been accepted at [facility]. It is a part of [place]. Pt is having an esophagram tomorrow and if ok will likely D/C tomorrow. Will continue to follow course for needs. Flaca Padilla RN

## 2024-07-18 NOTE — PROGRESS NOTES
V2.0    [facility] Progress Note      Name:  Wes Yanez /Age/Sex: 1945  (78 y.o. male)   MRN & CSN:  [id number] & [id number] Encounter Date/Time: 2024 7:27 AM EDT   Location:  [facility]7/0367-01 PCP: Michelle Lutz PA     Attending:Ha Horne DO       Hospital Day: 4    Assessment and Recommendations   Wes Yanez is a 78 y.o. male with pmh of possible BPH on Flomax, hyperlipidemia, Alzheimer's dementia who presents with Weakness    Interval History: No acute overnight events.  Patient was seen and examined at bedside this morning.  He denies fever, chills, chest pain, shortness of breath, nausea, vomiting.  He reports his weakness has resolved.    Plan:     Bilateral nonocclusive pulmonary embolism  -CT PE showed nonocclusive pulmonary emboli in the lower lobes  -Continue Eliquis 10 mg twice daily for 7 days followed by Eliquis 5 mg twice daily     Generalized weakness  Rule out CVA  -Patient presented with deficits consisting of generalized weakness, bilateral lower extremity weakness, tremors, inability to follow extraocular movements and commands on exam, although unsure if this is due to dementia  -Lipid panel within normal limits and A1c 5.6  -CT head 7/15 showed no acute intracranial abnormality, diffuse volume loss and mild chronic microvascular ischemic changes  -CTA head neck unremarkable and MRI brain showed no acute infarct  -Echo normal LV function with EF 61%  -Continue aspirin and continue atorvastatin at high dose 20 mg  -PT/OT following    Dysphagia  -SLP eval   -MBSS showed residual contrast within hypopharynx following initial swallows  -GI consulted   -NPO tonight   -Barium esophagram tomorrow    Bilateral lower extremity weakness (resolved)  Urinary/fecal incontinence (resolved)  -Repeat episodes of urinary/fecal incontinence.  Unclear whether true incontinence or if patient's lower extremity weakness contributes to him not being able to ambulate for urination and bowel  11:52 AM     TSH:   Lab Results   Component Value Date/Time    TSH 1.50 08/27/2015 10:00 AM     Troponin: No results found for: \"TROPONINT\"  Lactic Acid:   Recent Labs     07/15/24  1247   LACTA 1.4       BNP: No results for input(s): \"PROBNP\" in the last 72 hours.  UA:  Lab Results   Component Value Date/Time    NITRU NEGATIVE 08/27/2015 10:00 AM    COLORU Dark Yellow 07/15/2024 11:27 AM    COLORU YELLOW 08/27/2015 10:00 AM    PHUR 5.5 07/15/2024 11:27 AM    PHUR 7.0 08/27/2015 10:00 AM    CLARITYU cloudy 01/03/2023 12:18 PM    SPECGRAV >=1.030 07/15/2024 11:27 AM    LEUKOCYTESUR Negative 07/15/2024 11:27 AM    LEUKOCYTESUR NEGATIVE 08/27/2015 10:00 AM    UROBILINOGEN Normal 08/27/2015 10:00 AM    BILIRUBINUR Small 07/15/2024 11:27 AM    BLOODU Small 07/15/2024 11:27 AM    GLUCOSEU Negative 07/15/2024 11:27 AM    GLUCOSEU NEGATIVE 08/27/2015 10:00 AM    KETUA Trace 07/15/2024 11:27 AM    KETUA NEGATIVE 08/27/2015 10:00 AM     Urine Cultures:   Lab Results   Component Value Date/Time    LABURIN  07/15/2024 11:16 AM     <10,000 CFU/ml mixed skin/urogenital frandy. No further workup     Blood Cultures: No results found for: \"BC\"  No results found for: \"BLOODCULT2\"  Organism:   Lab Results   Component Value Date/Time    ORG Enterococcus faecalis 12/19/2022 11:51 AM     Jack Alejandra DO, PGY-2  [facility]

## 2024-07-19 ENCOUNTER — APPOINTMENT (OUTPATIENT)
Dept: GENERAL RADIOLOGY | Age: 79
End: 2024-07-19
Payer: MEDICARE

## 2024-07-19 LAB
ANION GAP SERPL CALCULATED.3IONS-SCNC: 9 MMOL/L (ref 3–16)
APTT BLD: 39.8 SEC (ref 22.1–36.4)
BASOPHILS # BLD: 0 K/UL (ref 0–0.2)
BASOPHILS NFR BLD: 0.4 %
BUN SERPL-MCNC: 13 MG/DL (ref 7–20)
CALCIUM SERPL-MCNC: 9 MG/DL (ref 8.3–10.6)
CHLORIDE SERPL-SCNC: 96 MMOL/L (ref 99–110)
CO2 SERPL-SCNC: 27 MMOL/L (ref 21–32)
CREAT SERPL-MCNC: 0.6 MG/DL (ref 0.8–1.3)
DEPRECATED RDW RBC AUTO: 14.1 % (ref 12.4–15.4)
EOSINOPHIL # BLD: 0.1 K/UL (ref 0–0.6)
EOSINOPHIL NFR BLD: 1 %
GFR SERPLBLD CREATININE-BSD FMLA CKD-EPI: >90 ML/MIN/{1.73_M2}
GLUCOSE SERPL-MCNC: 105 MG/DL (ref 70–99)
HCT VFR BLD AUTO: 41.9 % (ref 40.5–52.5)
HGB BLD-MCNC: 14 G/DL (ref 13.5–17.5)
LYMPHOCYTES # BLD: 0.9 K/UL (ref 1–5.1)
LYMPHOCYTES NFR BLD: 7.7 %
MCH RBC QN AUTO: 29.7 PG (ref 26–34)
MCHC RBC AUTO-ENTMCNC: 33.5 G/DL (ref 31–36)
MCV RBC AUTO: 88.6 FL (ref 80–100)
MONOCYTES # BLD: 1.1 K/UL (ref 0–1.3)
MONOCYTES NFR BLD: 8.8 %
NEUTROPHILS # BLD: 9.9 K/UL (ref 1.7–7.7)
NEUTROPHILS NFR BLD: 82.1 %
PLATELET # BLD AUTO: 360 K/UL (ref 135–450)
PMV BLD AUTO: 7 FL (ref 5–10.5)
POTASSIUM SERPL-SCNC: 3.9 MMOL/L (ref 3.5–5.1)
RBC # BLD AUTO: 4.73 M/UL (ref 4.2–5.9)
SODIUM SERPL-SCNC: 132 MMOL/L (ref 136–145)
WBC # BLD AUTO: 12 K/UL (ref 4–11)

## 2024-07-19 PROCEDURE — 80048 BASIC METABOLIC PNL TOTAL CA: CPT

## 2024-07-19 PROCEDURE — 6370000000 HC RX 637 (ALT 250 FOR IP)

## 2024-07-19 PROCEDURE — 92526 ORAL FUNCTION THERAPY: CPT

## 2024-07-19 PROCEDURE — 36415 COLL VENOUS BLD VENIPUNCTURE: CPT

## 2024-07-19 PROCEDURE — 74220 X-RAY XM ESOPHAGUS 1CNTRST: CPT

## 2024-07-19 PROCEDURE — 85730 THROMBOPLASTIN TIME PARTIAL: CPT

## 2024-07-19 PROCEDURE — 6360000002 HC RX W HCPCS: Performed by: STUDENT IN AN ORGANIZED HEALTH CARE EDUCATION/TRAINING PROGRAM

## 2024-07-19 PROCEDURE — 2580000003 HC RX 258

## 2024-07-19 PROCEDURE — 1200000000 HC SEMI PRIVATE

## 2024-07-19 PROCEDURE — 97110 THERAPEUTIC EXERCISES: CPT

## 2024-07-19 PROCEDURE — 6360000002 HC RX W HCPCS

## 2024-07-19 PROCEDURE — 85025 COMPLETE CBC W/AUTO DIFF WBC: CPT

## 2024-07-19 PROCEDURE — 97116 GAIT TRAINING THERAPY: CPT

## 2024-07-19 RX ORDER — HEPARIN SODIUM 1000 [USP'U]/ML
3400 INJECTION, SOLUTION INTRAVENOUS; SUBCUTANEOUS PRN
Status: DISCONTINUED | OUTPATIENT
Start: 2024-07-19 | End: 2024-07-22

## 2024-07-19 RX ORDER — HEPARIN SODIUM 1000 [USP'U]/ML
6900 INJECTION, SOLUTION INTRAVENOUS; SUBCUTANEOUS ONCE
Status: COMPLETED | OUTPATIENT
Start: 2024-07-19 | End: 2024-07-19

## 2024-07-19 RX ORDER — HEPARIN SODIUM 10000 [USP'U]/100ML
1720 INJECTION, SOLUTION INTRAVENOUS CONTINUOUS
Status: DISCONTINUED | OUTPATIENT
Start: 2024-07-19 | End: 2024-07-21

## 2024-07-19 RX ORDER — HEPARIN SODIUM 1000 [USP'U]/ML
6900 INJECTION, SOLUTION INTRAVENOUS; SUBCUTANEOUS PRN
Status: DISCONTINUED | OUTPATIENT
Start: 2024-07-19 | End: 2024-07-22

## 2024-07-19 RX ADMIN — SODIUM CHLORIDE, PRESERVATIVE FREE 10 ML: 5 INJECTION INTRAVENOUS at 19:54

## 2024-07-19 RX ADMIN — ASPIRIN 81 MG 81 MG: 81 TABLET ORAL at 10:58

## 2024-07-19 RX ADMIN — SODIUM CHLORIDE, PRESERVATIVE FREE 10 ML: 5 INJECTION INTRAVENOUS at 10:58

## 2024-07-19 RX ADMIN — HEPARIN SODIUM 6900 UNITS: 1000 INJECTION INTRAVENOUS; SUBCUTANEOUS at 20:56

## 2024-07-19 RX ADMIN — CEFTRIAXONE SODIUM 1000 MG: 1 INJECTION, POWDER, FOR SOLUTION INTRAMUSCULAR; INTRAVENOUS at 19:55

## 2024-07-19 RX ADMIN — HEPARIN SODIUM 1550 UNITS/HR: 10000 INJECTION, SOLUTION INTRAVENOUS at 20:57

## 2024-07-19 RX ADMIN — APIXABAN 10 MG: 5 TABLET, FILM COATED ORAL at 10:57

## 2024-07-19 RX ADMIN — AZITHROMYCIN 250 MG: 250 TABLET, FILM COATED ORAL at 10:57

## 2024-07-19 RX ADMIN — ATORVASTATIN CALCIUM 20 MG: 10 TABLET, FILM COATED ORAL at 10:57

## 2024-07-19 RX ADMIN — CITALOPRAM HYDROBROMIDE 10 MG: 20 TABLET ORAL at 10:57

## 2024-07-19 NOTE — PROGRESS NOTES
V2.0    [facility] Progress Note      Name:  Wes Yanez /Age/Sex: 1945  (78 y.o. male)   MRN & CSN:  [id number] & [id number] Encounter Date/Time: 2024 7:27 AM EDT   Location:  [facility]7/0367-01 PCP: Michelle Lutz PA     Attending:Ha Horne DO       Hospital Day: 5    Assessment and Recommendations   Wes Yanez is a 78 y.o. male with pmh of possible BPH on Flomax, hyperlipidemia, Alzheimer's dementia who presents with Weakness    Interval History: No acute overnight events.  Patient was seen and examined at bedside this morning.  He denies fever, chills, chest pain, shortness of breath, nausea, vomiting, weakness.    Plan:     Bilateral nonocclusive pulmonary embolism  -CT PE showed nonocclusive pulmonary emboli in the lower lobes  -Continue Eliquis 10 mg twice daily for 7 days followed by Eliquis 5 mg twice daily     Generalized weakness  Rule out CVA  -Patient presented with deficits consisting of generalized weakness, bilateral lower extremity weakness, tremors, inability to follow extraocular movements and commands on exam, although unsure if this is due to dementia  -Lipid panel within normal limits and A1c 5.6  -CT head 7/15 showed no acute intracranial abnormality, diffuse volume loss and mild chronic microvascular ischemic changes  -CTA head neck unremarkable and MRI brain showed no acute infarct  -Echo normal LV function with EF 61%  -Continue aspirin and atorvastatin  -PT/OT following    Dysphagia  -SLP eval   -MBSS showed residual contrast within hypopharynx following initial swallows  -GI consulted   -Barium esophagram showed silent aspiration with thickened barium   -GI to have conversation with family regarding plan    Bilateral lower extremity weakness (resolved)  Urinary/fecal incontinence (resolved)  -Repeat episodes of urinary/fecal incontinence.  Unclear whether true incontinence or if patient's lower extremity weakness contributes to him not being able to ambulate for

## 2024-07-19 NOTE — PROGRESS NOTES
Speech Language Pathology  Attempt Note     Name: Wes Yanez  : 1945  Medical Diagnosis: Weakness [R53.1]  Generalized weakness [R53.1]  Failure to thrive in adult [R62.7]  Stroke-like symptoms [R29.90]  Acute pulmonary embolism without acute cor pulmonale, unspecified pulmonary embolism type (HCC) [I26.99]      SLP attempted to see pt for dysphagia tx. Treatment unable to be completed due to pt NPO for esophagram per chart review. SLP to re-attempt as pt's condition and schedule allows. RN aware. No charges.      Kristina Pugh M.A. CCC-SLP  Speech Language Pathologist

## 2024-07-19 NOTE — CARE COORDINATION
Chart reviewed day 4. Care provided by GI and IM. Pt is from [facility]. Pt has recs for SNF and has been accepted at [facility] which is with [facility]. Son is aware and pt will need medical transport. Pt has a GI eval and awaiting their recommendation. Will continue to follow course for needs. Flaca Padilla RN

## 2024-07-19 NOTE — PROGRESS NOTES
Physical Therapy  Facility/Department: [facility] B3 - MED SURG  Daily Treatment Note  NAME: Wes Yanez  : 1945  MRN: [id number]    Date of Service: 2024    Discharge Recommendations:  Subacute/Skilled Nursing Facility   PT Equipment Recommendations  Equipment Needed: No    Patient Diagnosis(es): The primary encounter diagnosis was Acute pulmonary embolism without acute cor pulmonale, unspecified pulmonary embolism type (HCC). Diagnoses of Generalized weakness, Failure to thrive in adult, and Stroke-like symptoms were also pertinent to this visit.    Barriers to Home Discharge:   [] Steps to access home entry or bed/bath:   [x] Unable to transfer, ambulate, or propel wheelchair household distances without assist   [x] Limited available assist at home upon discharge    [] Patient or family requests d/c to post-acute facility    [x] Poor cognition/safety awareness for d/c to home alone    [] Unable to maintain ordered weight bearing status    [] Patient with salient signs of long-standing immobility   [x] Decreased independence with ADLs   [x] Increased risk for falls   [] Other:    Assessment   Assessment: Pt with improved mobility this date. Competes bed mobility with mod I, transfers with CGA, and ambulate with and without and AD with CGA. Gait slightly unsteady with no AD but demos no overt LOB. PT with good participation in seated exercises at the end of session with minimal verbal and manual cues for form and sequencing. Pt will continue to benefit from skilled PT services to address current deficits. It is rec pt DC to SNF when deemed medically appropriate.  Activity Tolerance: Patient tolerated treatment well;Treatment limited secondary to decreased cognition  Equipment Needed: No     Plan    Physical Therapy Plan  General Plan: 3-5 times per week  Current Treatment Recommendations: Strengthening;ROM;Balance training;Functional mobility training;Transfer training;ADL/Self-care training;IADL  7/22/2024 - all goals ongoing 7/19  Short Term Goal 1: Patient will be independent with bed mobility  Short Term Goal 2: Patient will be supervision with transfers using LRAD  Short Term Goal 3: Patient will be supervision with gait >300ft using LRAD  Short Term Goal 4: Patient will perform 10-12 reps of LE strengthening exercises by 7/19/24 -- MET 7/19  Additional Goals?: No  Patient Goals   Patient Goals : To go home    Education  Patient Education  Education Given To: Patient  Education Provided: Role of Therapy;Plan of Care;Transfer Training;Family Education;Fall Prevention Strategies  Education Provided Comments: Disease Specific Education: Pt educated on importance of OOB mobility, prevention of complications of bedrest, and general safety during hospitalization.  Education Method: Verbal;Demonstration  Barriers to Learning: Cognition  Education Outcome: Verbalized understanding;Continued education needed    AM-PAC - Mobility    AM-PAC Basic Mobility - Inpatient   How much help is needed turning from your back to your side while in a flat bed without using bedrails?: None  How much help is needed moving from lying on your back to sitting on the side of a flat bed without using bedrails?: None  How much help is needed moving to and from a bed to a chair?: A Little  How much help is needed standing up from a chair using your arms?: A Little  How much help is needed walking in hospital room?: A Little  How much help is needed climbing 3-5 steps with a railing?: A Lot  AM-PAC Inpatient Mobility Raw Score : 19  AM-PAC Inpatient T-Scale Score : 45.44  Mobility Inpatient CMS 0-100% Score: 41.77  Mobility Inpatient CMS G-Code Modifier : CK         Therapy Time   Individual Concurrent Group Co-treatment   Time In 1443         Time Out 1507         Minutes 24         Timed Code Treatment Minutes: 24 Minutes       Mayra Angel, PT, DPT    If pt is unable to be seen after this session, please let this note serve as

## 2024-07-19 NOTE — PROGRESS NOTES
Speech Language Pathology  Dysphagia Treatment/Follow-Up Note  Facility/Department: [facility] B3 - MED SURG    Recommendations:  Solid Consistency: IDDSI 4 Puree Solids  Liquid Consistency: IDDSI 0 Thin Liquids via small, single sips  Medication: Meds crushed in puree or whole with thin liquid as tolerated  Risk Management: upright for all intake, stay upright for at least 30 mins after intake, small bites/sips, 1:1 supervision with intake, cued purposeful cough every 2-3 bites/sips as able, oral care q4 hrs to reduce adverse affects in the event of aspiration, increase physical mobility as able, alternate bites/sips, slow rate of intake, general GERD precautions, general aspiration precautions, and hold PO and contact SLP if s/s of aspiration or worsening respiratory status develop.     NAME:Wes Yanez  : 1945 (78 y.o.)   MRN: [id number]  ROOM: [address] [place] [address]  ADMISSION DATE: 7/15/2024  PATIENT DIAGNOSIS(ES): Weakness [R53.1]  Generalized weakness [R53.1]  Failure to thrive in adult [R62.7]  Stroke-like symptoms [R29.90]  Acute pulmonary embolism without acute cor pulmonale, unspecified pulmonary embolism type (HCC) [I26.99]  Allergies   Allergen Reactions    Tramadol Hcl (Er Biphasic) Other (See Comments)     Causes more pain that he had before.        DATE ONSET: 7/15/2024    Pain: The patient does not complain of pain     Current Diet: ADULT DIET; Dysphagia - Pureed    Diet Tolerance:  Patient tolerating current diet level without signs/symptoms of aspiration.     Dysphagia Treatment and Impressions:  Subjective: Pt seen in room at bedside with RN permission   Behavior / Cognition: Alert and cooperative  RN Report/Chart Review: GI attempted esophagram this date; pt aspirated thickened liquids and study was discontinued  Patient tolerance: Participated well throughout PO trials    Liquid PO Trials:    IDDSI 0 Thin: Assessed via cup: timely swallow, few immediate throat clears    Solid PO Trials  IDDSI 4

## 2024-07-19 NOTE — PLAN OF CARE
Problem: Discharge Planning  Goal: Discharge to home or other facility with appropriate resources  7/18/2024 2135 by Anuradha Bullock, RN  Outcome: Progressing  Flowsheets (Taken 7/18/2024 1947)  Discharge to home or other facility with appropriate resources: Identify barriers to discharge with patient and caregiver     Problem: Safety - Adult  Goal: Free from fall injury  7/18/2024 2135 by Anuradha Bullock RN  Outcome: Progressing     Problem: Skin/Tissue Integrity  Goal: Absence of new skin breakdown  Description: 1.  Monitor for areas of redness and/or skin breakdown  2.  Assess vascular access sites hourly  3.  Every 4-6 hours minimum:  Change oxygen saturation probe site  4.  Every 4-6 hours:  If on nasal continuous positive airway pressure, respiratory therapy assess nares and determine need for appliance change or resting period.  7/18/2024 2135 by Anuradha Bullock RN  Outcome: Progressing     Problem: Confusion  Goal: Confusion, delirium, dementia, or psychosis is improved or at baseline  Description: INTERVENTIONS:  1. Assess for possible contributors to thought disturbance, including medications, impaired vision or hearing, underlying metabolic abnormalities, dehydration, psychiatric diagnoses, and notify attending LIP  2. [place] high risk fall precautions, as indicated  3. Provide frequent short contacts to provide reality reorientation, refocusing and direction  4. Decrease environmental stimuli, including noise as appropriate  5. Monitor and intervene to maintain adequate nutrition, hydration, elimination, sleep and activity  6. If unable to ensure safety without constant attention obtain sitter and review sitter guidelines with assigned personnel  7. Initiate Psychosocial CNS and Spiritual Care consult, as indicated  Outcome: Progressing  Flowsheets (Taken 7/18/2024 1947)  Effect of thought disturbance (confusion, delirium, dementia, or psychosis) are managed with adequate functional status: Assess for

## 2024-07-19 NOTE — PROGRESS NOTES
PROGRESS NOTE    HPI: Wes Yanez is a(n)78 y.o. male admitted for work-up and treatment for Weakness [R53.1]  Generalized weakness [R53.1]  Failure to thrive in adult [R62.7]  Stroke-like symptoms [R29.90]  Acute pulmonary embolism without acute cor pulmonale, unspecified pulmonary embolism type (HCC) [I26.99].     We are following for dysphagia.    Subjective:     He is tolerating purred diet.  No acute events overnight.      Objective:     I/O last 3 completed shifts:  In: 360 [P.O.:360]  Out: 0       /68   Pulse 62   Temp 98.4 °F (36.9 °C) (Oral)   Resp 18   Ht 1.829 m (6')   Wt 98.8 kg (217 lb 12.8 oz)   SpO2 92%   BMI 29.54 kg/m²     Physical Exam:  HEENT: anicteric sclera, oropharyngeal membranes pink and moist.  Cor: RRR  Lungs: non-labored, no respiratory distress  Abdomen: soft, NT. No ascites.  No hepatomegaly or splenomegaly  Extremities: no edema  Neuro: alert and oriented to person.      Results:   Lab Results   Component Value Date    ALT 12 07/15/2024    AST 17 07/15/2024    ALKPHOS 100 07/15/2024    BILIDIR <0.2 01/04/2019     Lab Results   Component Value Date    WBC 12.0 (H) 07/19/2024    HGB 14.0 07/19/2024    HCT 41.9 07/19/2024    MCV 88.6 07/19/2024     07/19/2024     BUN/Cr/glu/ALT/AST/amyl/lip:  13/0.6/--/--/--/--/-- (07/19 0653)  FL MODIFIED BARIUM SWALLOW W VIDEO    Result Date: 7/17/2024  1. Visualization of residual contrast material within the hypopharynx following initial swallows throughout the examination. 2. No evidence of deep penetration of the larynx or aspiration. Please see separate speech pathology report for full discussion of findings and recommendations.     CTA HEAD NECK W CONTRAST    Result Date: 7/16/2024  No flow limiting stenosis or large vessel occlusion detected within the head or neck.     MRI BRAIN WO CONTRAST    Result Date: 7/16/2024  No acute infarct.     MRI LUMBAR SPINE WO  CONTRAST    Result Date: 7/16/2024  1. Mild spinal canal stenosis at L4-L5.  No significant spinal canal stenosis at the remaining levels. 2. Multilevel neural foraminal narrowing as above. 3. Presumably subacute compression deformity of the L1 vertebral body with approximately 30% loss of height.  There is no bulging of the posterior cortex.     CT HEAD WO CONTRAST    Result Date: 7/15/2024  No acute intracranial abnormality. Diffuse volume loss and mild chronic microvascular ischemic changes.     CT CHEST PULMONARY EMBOLISM W CONTRAST    Result Date: 7/15/2024  CTA CHEST: 1. Nonocclusive pulmonary emboli in the lower lobes. 2. Ectasia of the ascending thoracic aorta. 3. Globular cardiomegaly but no other evidence for right heart strain. CT ABDOMEN AND PELVIS Moderate amount of stool in the colon     CT ABDOMEN PELVIS W IV CONTRAST Additional Contrast? None    Result Date: 7/15/2024  CTA CHEST: 1. Nonocclusive pulmonary emboli in the lower lobes. 2. Ectasia of the ascending thoracic aorta. 3. Globular cardiomegaly but no other evidence for right heart strain. CT ABDOMEN AND PELVIS Moderate amount of stool in the colon     XR CHEST PORTABLE    Result Date: 7/15/2024  Streaky airspace opacity of the left lung base may represent atelectasis versus developing pneumonia.         Impression:  78 year old male with past medical history of BPH, HLD, alzhemier's dementia who presented to the ER with acute onset weakness, fecal/urinary incontinence and FTT.     Abnormal MBS.  No aspiration.  \"Apparent reduced cricopharyngeal opening\".  Cleared for pureed diet by speech therapy.  Barium esophagram reports silent aspiration with thickened barium - exam was aborted.     2. Possible CVA.  MRI brain, CTA head/neck, CT  head without acute pathology.     3. Bilateral PE. On eliquis.     4. Pneumonia. On antibiotic. Not needing oxygen.    Plan:  Discussed case with son. He would like patient to proceed with EGD for evaluation and

## 2024-07-19 NOTE — PLAN OF CARE
Problem: Discharge Planning  Goal: Discharge to home or other facility with appropriate resources  7/19/2024 0909 by Wilfredo Guevara RN  Outcome: Progressing  7/18/2024 2135 by Anuradha Bullock RN  Outcome: Progressing  Flowsheets (Taken 7/18/2024 1947)  Discharge to home or other facility with appropriate resources: Identify barriers to discharge with patient and caregiver     Problem: Safety - Adult  Goal: Free from fall injury  7/19/2024 0909 by Wilfredo Guevara RN  Outcome: Progressing  7/18/2024 2135 by Anuradha Bullock RN  Outcome: Progressing     Problem: Skin/Tissue Integrity  Goal: Absence of new skin breakdown  Description: 1.  Monitor for areas of redness and/or skin breakdown  7/19/2024 0909 by Wilfredo Guevara RN  Outcome: Progressing  7/18/2024 2135 by Anuradha Bullock RN  Outcome: Progressing     Problem: Confusion  Goal: Confusion, delirium, dementia, or psychosis is improved or at baseline  Description: INTERVENTIONS:  1. Assess for possible contributors to thought disturbance, including medications, impaired vision or hearing, underlying metabolic abnormalities, dehydration, psychiatric diagnoses, and notify attending LIP  2. [place] high risk fall precautions, as indicated  3. Provide frequent short contacts to provide reality reorientation, refocusing and direction  4. Decrease environmental stimuli, including noise as appropriate  5. Monitor and intervene to maintain adequate nutrition, hydration, elimination, sleep and activity  6. If unable to ensure safety without constant attention obtain sitter and review sitter guidelines with assigned personnel  7. Initiate Psychosocial CNS and Spiritual Care consult, as indicated  7/18/2024 2135 by Anuradha Bullock RN  Outcome: Progressing  Flowsheets (Taken 7/18/2024 1947)  Effect of thought disturbance (confusion, delirium, dementia, or psychosis) are managed with adequate functional status: Assess for contributors to thought disturbance, including

## 2024-07-20 LAB
ANION GAP SERPL CALCULATED.3IONS-SCNC: 10 MMOL/L (ref 3–16)
APTT BLD: 68.1 SEC (ref 22.1–36.4)
APTT BLD: 78.6 SEC (ref 22.1–36.4)
APTT BLD: 93.3 SEC (ref 22.1–36.4)
APTT BLD: 98.2 SEC (ref 22.1–36.4)
BASOPHILS # BLD: 0.1 K/UL (ref 0–0.2)
BASOPHILS NFR BLD: 0.6 %
BUN SERPL-MCNC: 13 MG/DL (ref 7–20)
CALCIUM SERPL-MCNC: 8.5 MG/DL (ref 8.3–10.6)
CHLORIDE SERPL-SCNC: 98 MMOL/L (ref 99–110)
CO2 SERPL-SCNC: 24 MMOL/L (ref 21–32)
CREAT SERPL-MCNC: <0.5 MG/DL (ref 0.8–1.3)
DEPRECATED RDW RBC AUTO: 14.1 % (ref 12.4–15.4)
EOSINOPHIL # BLD: 0.2 K/UL (ref 0–0.6)
EOSINOPHIL NFR BLD: 1.7 %
GFR SERPLBLD CREATININE-BSD FMLA CKD-EPI: >90 ML/MIN/{1.73_M2}
GLUCOSE SERPL-MCNC: 107 MG/DL (ref 70–99)
HCT VFR BLD AUTO: 38.2 % (ref 40.5–52.5)
HGB BLD-MCNC: 13 G/DL (ref 13.5–17.5)
LYMPHOCYTES # BLD: 1.2 K/UL (ref 1–5.1)
LYMPHOCYTES NFR BLD: 13.2 %
MCH RBC QN AUTO: 29.7 PG (ref 26–34)
MCHC RBC AUTO-ENTMCNC: 34.1 G/DL (ref 31–36)
MCV RBC AUTO: 87.1 FL (ref 80–100)
MONOCYTES # BLD: 0.9 K/UL (ref 0–1.3)
MONOCYTES NFR BLD: 9.2 %
NEUTROPHILS # BLD: 6.9 K/UL (ref 1.7–7.7)
NEUTROPHILS NFR BLD: 75.3 %
PLATELET # BLD AUTO: 295 K/UL (ref 135–450)
PMV BLD AUTO: 7.2 FL (ref 5–10.5)
POTASSIUM SERPL-SCNC: 3.8 MMOL/L (ref 3.5–5.1)
RBC # BLD AUTO: 4.38 M/UL (ref 4.2–5.9)
SODIUM SERPL-SCNC: 132 MMOL/L (ref 136–145)
WBC # BLD AUTO: 9.2 K/UL (ref 4–11)

## 2024-07-20 PROCEDURE — 6360000002 HC RX W HCPCS: Performed by: STUDENT IN AN ORGANIZED HEALTH CARE EDUCATION/TRAINING PROGRAM

## 2024-07-20 PROCEDURE — 85025 COMPLETE CBC W/AUTO DIFF WBC: CPT

## 2024-07-20 PROCEDURE — 36415 COLL VENOUS BLD VENIPUNCTURE: CPT

## 2024-07-20 PROCEDURE — 6370000000 HC RX 637 (ALT 250 FOR IP)

## 2024-07-20 PROCEDURE — 1200000000 HC SEMI PRIVATE

## 2024-07-20 PROCEDURE — 85730 THROMBOPLASTIN TIME PARTIAL: CPT

## 2024-07-20 PROCEDURE — 6360000002 HC RX W HCPCS

## 2024-07-20 PROCEDURE — 80048 BASIC METABOLIC PNL TOTAL CA: CPT

## 2024-07-20 PROCEDURE — 2580000003 HC RX 258

## 2024-07-20 RX ADMIN — SODIUM CHLORIDE: 9 INJECTION, SOLUTION INTRAVENOUS at 20:01

## 2024-07-20 RX ADMIN — AZITHROMYCIN 250 MG: 250 TABLET, FILM COATED ORAL at 09:07

## 2024-07-20 RX ADMIN — CITALOPRAM HYDROBROMIDE 10 MG: 20 TABLET ORAL at 09:07

## 2024-07-20 RX ADMIN — HEPARIN SODIUM 1720 UNITS/HR: 10000 INJECTION, SOLUTION INTRAVENOUS at 13:13

## 2024-07-20 RX ADMIN — ASPIRIN 81 MG 81 MG: 81 TABLET ORAL at 09:06

## 2024-07-20 RX ADMIN — ATORVASTATIN CALCIUM 20 MG: 10 TABLET, FILM COATED ORAL at 09:07

## 2024-07-20 RX ADMIN — CEFTRIAXONE SODIUM 1000 MG: 1 INJECTION, POWDER, FOR SOLUTION INTRAMUSCULAR; INTRAVENOUS at 20:02

## 2024-07-20 NOTE — PROGRESS NOTES
V2.[place]    [facility] [place]      Name:  Wes Yanez /Age/Sex: 1945  (78 y.o. male)   MRN & CSN:  [id number] & [id number] Encounter Date/Time: 2024 7:27 AM EDT   Location:  0367/0367-01 PCP: Michelle Lutz PA     Attending:Ha Horne DO       Hospital Day: 6    Assessment and Recommendations   Wes Yanez is a 78 y.o. male with pmh of possible BPH on Flomax, hyperlipidemia, Alzheimer's dementia who presents with Weakness    Interval History: No acute overnight events.  Patient was seen and examined at bedside this morning.  He denies any concerns or pain.    Plan:     Bilateral nonocclusive pulmonary embolism  -CT PE showed nonocclusive pulmonary emboli in the lower lobes  -Continue Eliquis 10 mg twice daily for 7 days followed by Eliquis 5 mg twice daily (held due to heparin with a half a) left     Generalized weakness  Rule out CVA  -Patient presented with deficits consisting of generalized weakness, bilateral lower extremity weakness, tremors, inability to follow extraocular movements and commands on exam, although unsure if this is due to dementia  -Lipid panel within normal limits and A1c 5.6  -CT head 7/15 showed no acute intracranial abnormality, diffuse volume loss and mild chronic microvascular ischemic changes  -CTA head neck unremarkable and MRI brain showed no acute infarct  -Echo normal LV function with EF 61%  -Continue aspirin and atorvastatin  -PT/OT following    Dysphagia  -SLP eval   -MBSS showed residual contrast within hypopharynx following initial swallows  -GI consulted   -Barium esophagram showed silent aspiration with thickened barium   -EGD with possible dilation planned for Monday   -Hold heparin Monday morning    Bilateral lower extremity weakness (resolved)  Urinary/fecal incontinence (resolved)  -Repeat episodes of urinary/fecal incontinence.  Unclear whether true incontinence or if patient's lower extremity weakness contributes to him not being able to ambulate  Conjunctiva/sclera: Conjunctivae normal.      Pupils: Pupils are equal, round, and reactive to light.   Cardiovascular:      Rate and Rhythm: Normal rate and regular rhythm.      Heart sounds: Normal heart sounds. No murmur heard.     No friction rub. No gallop.   Pulmonary:      Effort: Pulmonary effort is normal.      Breath sounds: Normal breath sounds.   Abdominal:      General: Abdomen is flat. Bowel sounds are normal.      Palpations: Abdomen is soft.      Tenderness: There is no abdominal tenderness.   Musculoskeletal:      Cervical back: Normal range of motion and neck supple. No tenderness.      Comments: 5 out of 5 strength in upper extremities.  5 out of 5 strength in bilateral lower extremities.     Skin:     General: Skin is warm and dry.   Neurological:      Mental Status: He is alert.      Comments: Tremor resolved.  Psychiatric:         Mood and Affect: Mood normal.         Behavior: Behavior normal.         Thought Content: Thought content normal.      Comments: Pleasantly demented     Medications:   Medications:    aspirin  81 mg Oral Daily    atorvastatin  20 mg Oral Daily    citalopram  10 mg Oral Daily    sodium chloride flush  5-40 mL IntraVENous 2 times per day    cefTRIAXone (ROCEPHIN) IV  1,000 mg IntraVENous Q24H    azithromycin  250 mg Oral Daily    [Held by provider] tamsulosin  0.4 mg Oral Daily      Infusions:    heparin (PORCINE) Infusion 1,550 Units/hr (07/19/24 2057)    sodium chloride Stopped (07/19/24 2055)     PRN Meds: heparin (porcine), 6,900 Units, PRN  heparin (porcine), 3,400 Units, PRN  sodium chloride flush, 5-40 mL, PRN  sodium chloride, , PRN  ondansetron, 4 mg, Q8H PRN   Or  ondansetron, 4 mg, Q6H PRN  polyethylene glycol, 17 g, Daily PRN  acetaminophen, 650 mg, Q6H PRN   Or  acetaminophen, 650 mg, Q6H PRN        Labs and Imaging   CT HEAD WO CONTRAST    Result Date: 7/15/2024  EXAMINATION: CT OF THE HEAD WITHOUT CONTRAST  7/15/2024 9:34 pm TECHNIQUE: CT of the head was

## 2024-07-20 NOTE — PLAN OF CARE
Problem: Discharge Planning  Goal: Discharge to home or other facility with appropriate resources  Outcome: Progressing  Flowsheets (Taken 7/19/2024 1945)  Discharge to home or other facility with appropriate resources: Identify barriers to discharge with patient and caregiver     Problem: Safety - Adult  Goal: Free from fall injury  Outcome: Progressing     Problem: Skin/Tissue Integrity  Goal: Absence of new skin breakdown  Description: 1.  Monitor for areas of redness and/or skin breakdown  2.  Assess vascular access sites hourly  3.  Every 4-6 hours minimum:  Change oxygen saturation probe site  4.  Every 4-6 hours:  If on nasal continuous positive airway pressure, respiratory therapy assess nares and determine need for appliance change or resting period.  Outcome: Progressing     Problem: Confusion  Goal: Confusion, delirium, dementia, or psychosis is improved or at baseline  Description: INTERVENTIONS:  1. Assess for possible contributors to thought disturbance, including medications, impaired vision or hearing, underlying metabolic abnormalities, dehydration, psychiatric diagnoses, and notify attending LIP  2. [place] high risk fall precautions, as indicated  3. Provide frequent short contacts to provide reality reorientation, refocusing and direction  4. Decrease environmental stimuli, including noise as appropriate  5. Monitor and intervene to maintain adequate nutrition, hydration, elimination, sleep and activity  6. If unable to ensure safety without constant attention obtain sitter and review sitter guidelines with assigned personnel  7. Initiate Psychosocial CNS and Spiritual Care consult, as indicated  Outcome: Progressing  Flowsheets (Taken 7/19/2024 1945)  Effect of thought disturbance (confusion, delirium, dementia, or psychosis) are managed with adequate functional status: Assess for contributors to thought disturbance, including medications, impaired vision or hearing, underlying metabolic

## 2024-07-20 NOTE — CONSULTS
Pharmacy to manage Heparin - contact for questions.    Heparin 80 units/kg IV x 1 (max 10,000 units), then 18 units/kg/hr (recommended max initial rate 2100 units/hr). Adjust infusion rate based off aPTT results below.     aPTT < 59      Heparin 80 units/kg bolus  Increase infusion by 4 units/kg/hr  aPTT 59 - 72.9        Heparin 40 units/kg bolus Increase infusion by 2 units/kg/hr  aPTT 73 - 102      No bolus          No change   aPTT 102.1 - 109       No bolus          Decrease infusion by 1 units/kg/hr  aPTT 109.1 - 122.9    No bolus          Decrease infusion by 2 units/kg/hr  aPTT 123 or greater   Hold heparin for 1 hour          Decrease infusion by 3 units/kg/hr    Obtain aPTT 6 hours after bolus and 6 hours after any dose change until two consecutive therapeutic aPTT are achieved- then daily.   Initial bolus 6900 units, initial drip 1550 units/hour    Last dose of eliquis 7/19 @ 1057  Ricki Torres PharmD 7/19/2024 8:16 PM    7/20 0317  aptt = 98.2 sec  Continue current rate.  Next aptt 0900  Geoff Vazquez Pharm D.7/20/2024 5:52 AM    7/20 0903  aptt = 68.1 sec  Bolus 3400 units, increase rate to 1720 units/hr  Next aptt 1630  Mckenzie Dubose RPH, R.Ph. 7/20/2024 9:56 AM    7/20  - aPTT= 78.6 sec from 16:35 lab check   - No changes needed, continue infusion at current rate of 1720 units/hr per protocol  - Will recheck aPTT at 22:30.  Isis Matute/Vale. 7/20/24 5:21 PM EDT    7/20 2211  aptt = 93.3 sec  Continue current rate.  Next aptt daily  Lexie Bro.7/20/2024 10:57 PM    7/21 0622  aptt = 87.4 sec  Continue current rate.  Next aptt daily  Geoff Vazquez Pharm D.7/21/2024 7:25 AM

## 2024-07-20 NOTE — PLAN OF CARE
Problem: Discharge Planning  Goal: Discharge to home or other facility with appropriate resources  7/20/2024 1056 by Susan Ruiz RN  Outcome: Progressing  Flowsheets (Taken 7/20/2024 1056)  Discharge to home or other facility with appropriate resources: Identify barriers to discharge with patient and caregiver     Problem: Safety - Adult  Goal: Free from fall injury  7/20/2024 1056 by Susan Ruiz RN  Outcome: Progressing  Flowsheets (Taken 7/20/2024 1056)  Free From Fall Injury: Instruct family/caregiver on patient safety     Problem: Skin/Tissue Integrity  Goal: Absence of new skin breakdown  Description: 1.  Monitor for areas of redness and/or skin breakdown  2.  Assess vascular access sites hourly  3.  Every 4-6 hours minimum:  Change oxygen saturation probe site  4.  Every 4-6 hours:  If on nasal continuous positive airway pressure, respiratory therapy assess nares and determine need for appliance change or resting period.  7/20/2024 1056 by Susan Ruiz RN  Outcome: Progressing     Problem: Confusion  Goal: Confusion, delirium, dementia, or psychosis is improved or at baseline  Description: INTERVENTIONS:  1. Assess for possible contributors to thought disturbance, including medications, impaired vision or hearing, underlying metabolic abnormalities, dehydration, psychiatric diagnoses, and notify attending LIP  2. [place] high risk fall precautions, as indicated  3. Provide frequent short contacts to provide reality reorientation, refocusing and direction  4. Decrease environmental stimuli, including noise as appropriate  5. Monitor and intervene to maintain adequate nutrition, hydration, elimination, sleep and activity  6. If unable to ensure safety without constant attention obtain sitter and review sitter guidelines with assigned personnel  7. Initiate Psychosocial CNS and Spiritual Care consult, as indicated  7/20/2024 1056 by Susan Ruiz RN  Outcome: Progressing  Flowsheets (Taken 7/20/2024  1056)  Effect of thought disturbance (confusion, delirium, dementia, or psychosis) are managed with adequate functional status:   Assess for contributors to thought disturbance, including medications, impaired vision or hearing, underlying metabolic abnormalities, dehydration, psychiatric diagnoses, notify LIP   [place] high risk fall precautions, as indicated     Problem: Hematologic - Adult  Goal: Maintains hematologic stability  Outcome: Progressing  Flowsheets (Taken 7/20/2024 1056)  Maintains hematologic stability:   Assess for signs and symptoms of bleeding or hemorrhage   Monitor labs for bleeding or clotting disorders

## 2024-07-20 NOTE — PROGRESS NOTES
PROGRESS NOTE  S:78 yrs Patient  admitted on 7/15/2024 with Weakness [R53.1]  Generalized weakness [R53.1]  Failure to thrive in adult [R62.7]  Stroke-like symptoms [R29.90]  Acute pulmonary embolism without acute cor pulmonale, unspecified pulmonary embolism type (HCC) [I26.99] .    No new complaints. No family at the bedside.     Current Hospital Schedued Meds   aspirin  81 mg Oral Daily    atorvastatin  20 mg Oral Daily    citalopram  10 mg Oral Daily    sodium chloride flush  5-40 mL IntraVENous 2 times per day    cefTRIAXone (ROCEPHIN) IV  1,000 mg IntraVENous Q24H    azithromycin  250 mg Oral Daily    [Held by provider] tamsulosin  0.4 mg Oral Daily     Current Hospital IV Meds   heparin (PORCINE) Infusion 1,720 Units/hr (07/20/24 1006)    sodium chloride Stopped (07/19/24 2055)     Current Hospital PRN Meds  heparin (porcine), heparin (porcine), sodium chloride flush, sodium chloride, ondansetron **OR** ondansetron, polyethylene glycol, acetaminophen **OR** acetaminophen    Exam:   Vitals:    07/20/24 0800   BP: 130/69   Pulse: 70   Resp: 22   Temp: 98.4 °F (36.9 °C)   SpO2: 92%     I/O last 3 completed shifts:  In: 680 [P.O.:680]  Out: 0     A medically necessary and appropriate physical exam was performed.     Labs:  CBC:   Recent Labs     07/18/24  0551 07/19/24  0653 07/20/24 0317   WBC 8.3 12.0* 9.2   HGB 12.6* 14.0 13.0*   HCT 37.2* 41.9 38.2*   MCV 88.9 88.6 87.1    360 295     BMP:   Recent Labs     07/18/24  0551 07/19/24  0653 07/20/24 0317    132* 132*   K 3.8 3.9 3.8    96* 98*   CO2 25 27 24   BUN 15 13 13   CREATININE 0.6* 0.6* <0.5*     LIVER PROFILE: No results for input(s): \"AST\", \"ALT\", \"LIPASE\", \"AMYLASE\", \"BILIDIR\", \"BILITOT\", \"ALKPHOS\" in the last 72 hours.    Invalid input(s): \"PROT\", \"ALB\"  PT/INR: No results for input(s): \"INR\" in the last 72 hours.    Invalid input(s): \"PT\"    IMAGING:  FL ESOPHAGRAM    Result Date:

## 2024-07-21 LAB
ANION GAP SERPL CALCULATED.3IONS-SCNC: 12 MMOL/L (ref 3–16)
APTT BLD: 87.4 SEC (ref 22.1–36.4)
BASOPHILS # BLD: 0.1 K/UL (ref 0–0.2)
BASOPHILS NFR BLD: 0.9 %
BUN SERPL-MCNC: 11 MG/DL (ref 7–20)
CALCIUM SERPL-MCNC: 8.6 MG/DL (ref 8.3–10.6)
CHLORIDE SERPL-SCNC: 99 MMOL/L (ref 99–110)
CO2 SERPL-SCNC: 23 MMOL/L (ref 21–32)
CREAT SERPL-MCNC: <0.5 MG/DL (ref 0.8–1.3)
DEPRECATED RDW RBC AUTO: 14.2 % (ref 12.4–15.4)
EOSINOPHIL # BLD: 0.2 K/UL (ref 0–0.6)
EOSINOPHIL NFR BLD: 2.1 %
GFR SERPLBLD CREATININE-BSD FMLA CKD-EPI: >90 ML/MIN/{1.73_M2}
GLUCOSE SERPL-MCNC: 96 MG/DL (ref 70–99)
HCT VFR BLD AUTO: 37.9 % (ref 40.5–52.5)
HGB BLD-MCNC: 12.9 G/DL (ref 13.5–17.5)
LYMPHOCYTES # BLD: 1.4 K/UL (ref 1–5.1)
LYMPHOCYTES NFR BLD: 17.2 %
MCH RBC QN AUTO: 29.8 PG (ref 26–34)
MCHC RBC AUTO-ENTMCNC: 34 G/DL (ref 31–36)
MCV RBC AUTO: 87.8 FL (ref 80–100)
MONOCYTES # BLD: 0.8 K/UL (ref 0–1.3)
MONOCYTES NFR BLD: 10.1 %
NEUTROPHILS # BLD: 5.7 K/UL (ref 1.7–7.7)
NEUTROPHILS NFR BLD: 69.7 %
PLATELET # BLD AUTO: 325 K/UL (ref 135–450)
PMV BLD AUTO: 7.3 FL (ref 5–10.5)
POTASSIUM SERPL-SCNC: 4.1 MMOL/L (ref 3.5–5.1)
RBC # BLD AUTO: 4.31 M/UL (ref 4.2–5.9)
SODIUM SERPL-SCNC: 134 MMOL/L (ref 136–145)
WBC # BLD AUTO: 8.2 K/UL (ref 4–11)

## 2024-07-21 PROCEDURE — 6360000002 HC RX W HCPCS

## 2024-07-21 PROCEDURE — 80048 BASIC METABOLIC PNL TOTAL CA: CPT

## 2024-07-21 PROCEDURE — 1200000000 HC SEMI PRIVATE

## 2024-07-21 PROCEDURE — 85730 THROMBOPLASTIN TIME PARTIAL: CPT

## 2024-07-21 PROCEDURE — 85025 COMPLETE CBC W/AUTO DIFF WBC: CPT

## 2024-07-21 PROCEDURE — 6360000002 HC RX W HCPCS: Performed by: INTERNAL MEDICINE

## 2024-07-21 PROCEDURE — 6360000002 HC RX W HCPCS: Performed by: STUDENT IN AN ORGANIZED HEALTH CARE EDUCATION/TRAINING PROGRAM

## 2024-07-21 PROCEDURE — 2580000003 HC RX 258

## 2024-07-21 PROCEDURE — 6370000000 HC RX 637 (ALT 250 FOR IP)

## 2024-07-21 RX ORDER — HEPARIN SODIUM 10000 [USP'U]/100ML
1720 INJECTION, SOLUTION INTRAVENOUS CONTINUOUS
Status: DISCONTINUED | OUTPATIENT
Start: 2024-07-21 | End: 2024-07-22

## 2024-07-21 RX ADMIN — CEFTRIAXONE SODIUM 1000 MG: 1 INJECTION, POWDER, FOR SOLUTION INTRAMUSCULAR; INTRAVENOUS at 19:54

## 2024-07-21 RX ADMIN — ASPIRIN 81 MG 81 MG: 81 TABLET ORAL at 08:31

## 2024-07-21 RX ADMIN — HEPARIN SODIUM AND DEXTROSE 1720 UNITS/HR: 10000; 5 INJECTION INTRAVENOUS at 16:20

## 2024-07-21 RX ADMIN — AZITHROMYCIN 250 MG: 250 TABLET, FILM COATED ORAL at 08:31

## 2024-07-21 RX ADMIN — ATORVASTATIN CALCIUM 20 MG: 10 TABLET, FILM COATED ORAL at 08:31

## 2024-07-21 RX ADMIN — CITALOPRAM HYDROBROMIDE 10 MG: 20 TABLET ORAL at 08:31

## 2024-07-21 RX ADMIN — HEPARIN SODIUM 1720 UNITS/HR: 10000 INJECTION, SOLUTION INTRAVENOUS at 04:39

## 2024-07-21 ASSESSMENT — PAIN SCALES - GENERAL
PAINLEVEL_OUTOF10: 0

## 2024-07-21 NOTE — PROGRESS NOTES
[facility] Medicine Progress Note      Date of Admission: 7/15/2024  Hospital Day: 7    Chief Admission Complaint:  Weakness     Subjective:  Patient in hospital bed awake and alert. No new issues or complaints today. Feels ok. No pain or SOB.     Presenting Admission History:       Wes Yanez is a 78 y.o. male with pmh of possible BPH on Flomax, hyperlipidemia, Alzheimer's dementia who presents with generalized weakness.  Patient reports that he had an episode of urine and fecal incontinence today.  It is unclear whether patient's incontinence is due to his bilateral leg weakness causing him to be unable to get out of bed or whether it is true incontinence.  Patient's son reports that patient has been active and normally functioning up until this past weekend.  Patient was previously able to walk around and exercise on his own as well as play golf.  Son states that patient has not been able to ambulate and has not been drinking and eating well.  He reports another episode 1 and half months ago of fecal incontinence.  Patient's son reports new onset bilateral hand tremor and bilateral lower extremity shaking.  Patient's son is not aware of any fever, chills, chest pain, shortness of breath that patient has been complaining of.  ED workup showed WBC 14.1, sodium 132, troponin 36 with repeat 51, normal lactate, normal CK.  POCT UA was unremarkable with repeat UA pending.  Chest x-ray showed streaky airspace opacity of the left lung base possibly representing atelectasis versus developing pneumonia.  CT PE showed nonocclusive pulmonary emboli in the lower lobes.  CT abdomen pelvis showed moderate amount of stool in the colon. Patient was admitted for weakness, CVA rule out, pneumonia management.     Assessment/Plan:      Current Principal Problem:  Weakness    PNA - likely gram positive CAP w/ Strep Pneumonia.  Started on empiric axithromycin & rocephin on 7/15/24.      Nonocclusive PE, BL   -Etiology unclear at

## 2024-07-21 NOTE — PLAN OF CARE
Problem: Discharge Planning  Goal: Discharge to home or other facility with appropriate resources  7/21/2024 1009 by Susan Ruiz RN  Outcome: Progressing  Flowsheets (Taken 7/21/2024 1009)  Discharge to home or other facility with appropriate resources:   Identify barriers to discharge with patient and caregiver   Arrange for needed discharge resources and transportation as appropriate     Problem: Safety - Adult  Goal: Free from fall injury  7/21/2024 1009 by Susan Ruiz RN  Outcome: Progressing  Flowsheets (Taken 7/21/2024 1009)  Free From Fall Injury: Instruct family/caregiver on patient safety     Problem: Skin/Tissue Integrity  Goal: Absence of new skin breakdown  Description: 1.  Monitor for areas of redness and/or skin breakdown  2.  Assess vascular access sites hourly  3.  Every 4-6 hours minimum:  Change oxygen saturation probe site  4.  Every 4-6 hours:  If on nasal continuous positive airway pressure, respiratory therapy assess nares and determine need for appliance change or resting period.  7/21/2024 1009 by Susan Ruiz RN  Outcome: Progressing     Problem: Confusion  Goal: Confusion, delirium, dementia, or psychosis is improved or at baseline  Description: INTERVENTIONS:  1. Assess for possible contributors to thought disturbance, including medications, impaired vision or hearing, underlying metabolic abnormalities, dehydration, psychiatric diagnoses, and notify attending LIP  2. [place] high risk fall precautions, as indicated  3. Provide frequent short contacts to provide reality reorientation, refocusing and direction  4. Decrease environmental stimuli, including noise as appropriate  5. Monitor and intervene to maintain adequate nutrition, hydration, elimination, sleep and activity  6. If unable to ensure safety without constant attention obtain sitter and review sitter guidelines with assigned personnel  7. Initiate Psychosocial CNS and Spiritual Care consult, as indicated  7/21/2024  1009 by Susan Ruiz, RN  Outcome: Progressing  Flowsheets (Taken 7/21/2024 1009)  Effect of thought disturbance (confusion, delirium, dementia, or psychosis) are managed with adequate functional status:   Assess for contributors to thought disturbance, including medications, impaired vision or hearing, underlying metabolic abnormalities, dehydration, psychiatric diagnoses, notify LIP   [place] high risk fall precautions, as indicated     Problem: Hematologic - Adult  Goal: Maintains hematologic stability  7/21/2024 1009 by Susan Ruiz, RN  Outcome: Progressing  Flowsheets (Taken 7/21/2024 1009)  Maintains hematologic stability: Assess for signs and symptoms of bleeding or hemorrhage

## 2024-07-21 NOTE — PLAN OF CARE
Problem: Discharge Planning  Goal: Discharge to home or other facility with appropriate resources  7/20/2024 2058 by Kitty Jones RN  Outcome: Progressing  Flowsheets (Taken 7/20/2024 1945)  Discharge to home or other facility with appropriate resources: Identify barriers to discharge with patient and caregiver  7/20/2024 1056 by Susan Ruiz RN  Outcome: Progressing  Flowsheets (Taken 7/20/2024 1056)  Discharge to home or other facility with appropriate resources: Identify barriers to discharge with patient and caregiver     Problem: Safety - Adult  Goal: Free from fall injury  7/20/2024 2058 by Kitty Jones RN  Outcome: Progressing  7/20/2024 1056 by Susan Ruiz RN  Outcome: Progressing  Flowsheets (Taken 7/20/2024 1056)  Free From Fall Injury: Instruct family/caregiver on patient safety     Problem: Skin/Tissue Integrity  Goal: Absence of new skin breakdown  Description: 1.  Monitor for areas of redness and/or skin breakdown  2.  Assess vascular access sites hourly  3.  Every 4-6 hours minimum:  Change oxygen saturation probe site  4.  Every 4-6 hours:  If on nasal continuous positive airway pressure, respiratory therapy assess nares and determine need for appliance change or resting period.  7/20/2024 2058 by Kitty Jones RN  Outcome: Progressing  7/20/2024 1056 by Susan Ruiz RN  Outcome: Progressing     Problem: Confusion  Goal: Confusion, delirium, dementia, or psychosis is improved or at baseline  Description: INTERVENTIONS:  1. Assess for possible contributors to thought disturbance, including medications, impaired vision or hearing, underlying metabolic abnormalities, dehydration, psychiatric diagnoses, and notify attending LIP  2. [place] high risk fall precautions, as indicated  3. Provide frequent short contacts to provide reality reorientation, refocusing and direction  4. Decrease environmental stimuli, including noise as appropriate  5. Monitor and intervene to maintain adequate nutrition,  hydration, elimination, sleep and activity  6. If unable to ensure safety without constant attention obtain sitter and review sitter guidelines with assigned personnel  7. Initiate Psychosocial CNS and Spiritual Care consult, as indicated  7/20/2024 2058 by Kitty Jones RN  Outcome: Progressing  7/20/2024 1056 by Susan Ruiz RN  Outcome: Progressing  Flowsheets (Taken 7/20/2024 1056)  Effect of thought disturbance (confusion, delirium, dementia, or psychosis) are managed with adequate functional status:   Assess for contributors to thought disturbance, including medications, impaired vision or hearing, underlying metabolic abnormalities, dehydration, psychiatric diagnoses, notify LIP   [place] high risk fall precautions, as indicated     Problem: Hematologic - Adult  Goal: Maintains hematologic stability  7/20/2024 2058 by Kitty Jones RN  Outcome: Progressing  Flowsheets (Taken 7/20/2024 1945)  Maintains hematologic stability: Assess for signs and symptoms of bleeding or hemorrhage  7/20/2024 1056 by Susan Ruiz RN  Outcome: Progressing  Flowsheets (Taken 7/20/2024 1056)  Maintains hematologic stability:   Assess for signs and symptoms of bleeding or hemorrhage   Monitor labs for bleeding or clotting disorders

## 2024-07-21 NOTE — PLAN OF CARE
Problem: Discharge Planning  Goal: Discharge to home or other facility with appropriate resources  7/20/2024 2059 by Kitty Jones RN  Outcome: Progressing  7/20/2024 2058 by Kitty Jones RN  Outcome: Progressing  Flowsheets (Taken 7/20/2024 1945)  Discharge to home or other facility with appropriate resources: Identify barriers to discharge with patient and caregiver  7/20/2024 1056 by Susan Ruiz RN  Outcome: Progressing  Flowsheets (Taken 7/20/2024 1056)  Discharge to home or other facility with appropriate resources: Identify barriers to discharge with patient and caregiver     Problem: Safety - Adult  Goal: Free from fall injury  7/20/2024 2059 by Kitty Jones RN  Outcome: Progressing  7/20/2024 2058 by Kitty Jones RN  Outcome: Progressing  7/20/2024 1056 by Susan Ruiz RN  Outcome: Progressing  Flowsheets (Taken 7/20/2024 1056)  Free From Fall Injury: Instruct family/caregiver on patient safety     Problem: Skin/Tissue Integrity  Goal: Absence of new skin breakdown  Description: 1.  Monitor for areas of redness and/or skin breakdown  2.  Assess vascular access sites hourly  3.  Every 4-6 hours minimum:  Change oxygen saturation probe site  4.  Every 4-6 hours:  If on nasal continuous positive airway pressure, respiratory therapy assess nares and determine need for appliance change or resting period.  7/20/2024 2059 by Kitty Jones RN  Outcome: Progressing  7/20/2024 2058 by Kitty Jones RN  Outcome: Progressing  7/20/2024 1056 by Susan Ruiz RN  Outcome: Progressing     Problem: Confusion  Goal: Confusion, delirium, dementia, or psychosis is improved or at baseline  Description: INTERVENTIONS:  1. Assess for possible contributors to thought disturbance, including medications, impaired vision or hearing, underlying metabolic abnormalities, dehydration, psychiatric diagnoses, and notify attending LIP  2. [place] high risk fall precautions, as indicated  3. Provide frequent short contacts to provide

## 2024-07-22 ENCOUNTER — ANESTHESIA (OUTPATIENT)
Dept: ENDOSCOPY | Age: 79
End: 2024-07-22
Payer: MEDICARE

## 2024-07-22 ENCOUNTER — ANESTHESIA EVENT (OUTPATIENT)
Dept: ENDOSCOPY | Age: 79
End: 2024-07-22
Payer: MEDICARE

## 2024-07-22 LAB
ANION GAP SERPL CALCULATED.3IONS-SCNC: 9 MMOL/L (ref 3–16)
APTT BLD: 59.7 SEC (ref 22.1–36.4)
BASOPHILS # BLD: 0.1 K/UL (ref 0–0.2)
BASOPHILS NFR BLD: 0.9 %
BUN SERPL-MCNC: 9 MG/DL (ref 7–20)
CALCIUM SERPL-MCNC: 8.7 MG/DL (ref 8.3–10.6)
CHLORIDE SERPL-SCNC: 101 MMOL/L (ref 99–110)
CO2 SERPL-SCNC: 25 MMOL/L (ref 21–32)
CREAT SERPL-MCNC: <0.5 MG/DL (ref 0.8–1.3)
DEPRECATED RDW RBC AUTO: 14.2 % (ref 12.4–15.4)
EOSINOPHIL # BLD: 0.2 K/UL (ref 0–0.6)
EOSINOPHIL NFR BLD: 3 %
GFR SERPLBLD CREATININE-BSD FMLA CKD-EPI: >90 ML/MIN/{1.73_M2}
GLUCOSE SERPL-MCNC: 97 MG/DL (ref 70–99)
HCT VFR BLD AUTO: 38.1 % (ref 40.5–52.5)
HGB BLD-MCNC: 13.1 G/DL (ref 13.5–17.5)
LYMPHOCYTES # BLD: 1.3 K/UL (ref 1–5.1)
LYMPHOCYTES NFR BLD: 18 %
MCH RBC QN AUTO: 30.4 PG (ref 26–34)
MCHC RBC AUTO-ENTMCNC: 34.3 G/DL (ref 31–36)
MCV RBC AUTO: 88.7 FL (ref 80–100)
MONOCYTES # BLD: 0.6 K/UL (ref 0–1.3)
MONOCYTES NFR BLD: 8.3 %
NEUTROPHILS # BLD: 5.1 K/UL (ref 1.7–7.7)
NEUTROPHILS NFR BLD: 69.8 %
PLATELET # BLD AUTO: 339 K/UL (ref 135–450)
PMV BLD AUTO: 7.4 FL (ref 5–10.5)
POTASSIUM SERPL-SCNC: 3.9 MMOL/L (ref 3.5–5.1)
RBC # BLD AUTO: 4.3 M/UL (ref 4.2–5.9)
SODIUM SERPL-SCNC: 135 MMOL/L (ref 136–145)
WBC # BLD AUTO: 7.3 K/UL (ref 4–11)

## 2024-07-22 PROCEDURE — 0DB98ZX EXCISION OF DUODENUM, VIA NATURAL OR ARTIFICIAL OPENING ENDOSCOPIC, DIAGNOSTIC: ICD-10-PCS | Performed by: INTERNAL MEDICINE

## 2024-07-22 PROCEDURE — 6370000000 HC RX 637 (ALT 250 FOR IP): Performed by: INTERNAL MEDICINE

## 2024-07-22 PROCEDURE — 80048 BASIC METABOLIC PNL TOTAL CA: CPT

## 2024-07-22 PROCEDURE — 3609012400 HC EGD TRANSORAL BIOPSY SINGLE/MULTIPLE: Performed by: INTERNAL MEDICINE

## 2024-07-22 PROCEDURE — 0DB58ZX EXCISION OF ESOPHAGUS, VIA NATURAL OR ARTIFICIAL OPENING ENDOSCOPIC, DIAGNOSTIC: ICD-10-PCS | Performed by: INTERNAL MEDICINE

## 2024-07-22 PROCEDURE — 6360000002 HC RX W HCPCS: Performed by: NURSE ANESTHETIST, CERTIFIED REGISTERED

## 2024-07-22 PROCEDURE — 3700000001 HC ADD 15 MINUTES (ANESTHESIA): Performed by: INTERNAL MEDICINE

## 2024-07-22 PROCEDURE — 36415 COLL VENOUS BLD VENIPUNCTURE: CPT

## 2024-07-22 PROCEDURE — 2500000003 HC RX 250 WO HCPCS: Performed by: NURSE ANESTHETIST, CERTIFIED REGISTERED

## 2024-07-22 PROCEDURE — 3609012700 HC EGD DILATION SAVORY: Performed by: INTERNAL MEDICINE

## 2024-07-22 PROCEDURE — 85730 THROMBOPLASTIN TIME PARTIAL: CPT

## 2024-07-22 PROCEDURE — 7100000010 HC PHASE II RECOVERY - FIRST 15 MIN: Performed by: INTERNAL MEDICINE

## 2024-07-22 PROCEDURE — 7100000011 HC PHASE II RECOVERY - ADDTL 15 MIN: Performed by: INTERNAL MEDICINE

## 2024-07-22 PROCEDURE — 0D758ZZ DILATION OF ESOPHAGUS, VIA NATURAL OR ARTIFICIAL OPENING ENDOSCOPIC: ICD-10-PCS | Performed by: INTERNAL MEDICINE

## 2024-07-22 PROCEDURE — 2700000000 HC OXYGEN THERAPY PER DAY

## 2024-07-22 PROCEDURE — 0DB18ZX EXCISION OF UPPER ESOPHAGUS, VIA NATURAL OR ARTIFICIAL OPENING ENDOSCOPIC, DIAGNOSTIC: ICD-10-PCS | Performed by: INTERNAL MEDICINE

## 2024-07-22 PROCEDURE — 3700000000 HC ANESTHESIA ATTENDED CARE: Performed by: INTERNAL MEDICINE

## 2024-07-22 PROCEDURE — 85025 COMPLETE CBC W/AUTO DIFF WBC: CPT

## 2024-07-22 PROCEDURE — 2580000003 HC RX 258: Performed by: NURSE ANESTHETIST, CERTIFIED REGISTERED

## 2024-07-22 PROCEDURE — 2580000003 HC RX 258: Performed by: ANESTHESIOLOGY

## 2024-07-22 PROCEDURE — 2709999900 HC NON-CHARGEABLE SUPPLY: Performed by: INTERNAL MEDICINE

## 2024-07-22 PROCEDURE — 88305 TISSUE EXAM BY PATHOLOGIST: CPT

## 2024-07-22 PROCEDURE — 2580000003 HC RX 258

## 2024-07-22 PROCEDURE — 94761 N-INVAS EAR/PLS OXIMETRY MLT: CPT

## 2024-07-22 PROCEDURE — 6370000000 HC RX 637 (ALT 250 FOR IP): Performed by: NURSE PRACTITIONER

## 2024-07-22 PROCEDURE — 1200000000 HC SEMI PRIVATE

## 2024-07-22 PROCEDURE — C1769 GUIDE WIRE: HCPCS | Performed by: INTERNAL MEDICINE

## 2024-07-22 PROCEDURE — 6370000000 HC RX 637 (ALT 250 FOR IP)

## 2024-07-22 RX ORDER — LABETALOL HYDROCHLORIDE 5 MG/ML
10 INJECTION, SOLUTION INTRAVENOUS
Status: DISCONTINUED | OUTPATIENT
Start: 2024-07-22 | End: 2024-07-22 | Stop reason: HOSPADM

## 2024-07-22 RX ORDER — NALOXONE HYDROCHLORIDE 0.4 MG/ML
INJECTION, SOLUTION INTRAMUSCULAR; INTRAVENOUS; SUBCUTANEOUS PRN
Status: DISCONTINUED | OUTPATIENT
Start: 2024-07-22 | End: 2024-07-22 | Stop reason: HOSPADM

## 2024-07-22 RX ORDER — PANTOPRAZOLE SODIUM 40 MG/1
40 TABLET, DELAYED RELEASE ORAL
Status: DISCONTINUED | OUTPATIENT
Start: 2024-07-22 | End: 2024-07-23 | Stop reason: HOSPADM

## 2024-07-22 RX ORDER — SODIUM CHLORIDE 9 MG/ML
INJECTION, SOLUTION INTRAVENOUS PRN
Status: DISCONTINUED | OUTPATIENT
Start: 2024-07-22 | End: 2024-07-22 | Stop reason: HOSPADM

## 2024-07-22 RX ORDER — ONDANSETRON 2 MG/ML
4 INJECTION INTRAMUSCULAR; INTRAVENOUS EVERY 30 MIN PRN
Status: DISCONTINUED | OUTPATIENT
Start: 2024-07-22 | End: 2024-07-22 | Stop reason: HOSPADM

## 2024-07-22 RX ORDER — SODIUM CHLORIDE 0.9 % (FLUSH) 0.9 %
5-40 SYRINGE (ML) INJECTION EVERY 12 HOURS SCHEDULED
Status: DISCONTINUED | OUTPATIENT
Start: 2024-07-22 | End: 2024-07-22 | Stop reason: HOSPADM

## 2024-07-22 RX ORDER — SODIUM CHLORIDE, SODIUM LACTATE, POTASSIUM CHLORIDE, CALCIUM CHLORIDE 600; 310; 30; 20 MG/100ML; MG/100ML; MG/100ML; MG/100ML
INJECTION, SOLUTION INTRAVENOUS CONTINUOUS
Status: DISCONTINUED | OUTPATIENT
Start: 2024-07-22 | End: 2024-07-22 | Stop reason: HOSPADM

## 2024-07-22 RX ORDER — MECOBALAMIN 5000 MCG
10 TABLET,DISINTEGRATING ORAL NIGHTLY
Status: DISCONTINUED | OUTPATIENT
Start: 2024-07-22 | End: 2024-07-23 | Stop reason: HOSPADM

## 2024-07-22 RX ORDER — SODIUM CHLORIDE, SODIUM LACTATE, POTASSIUM CHLORIDE, CALCIUM CHLORIDE 600; 310; 30; 20 MG/100ML; MG/100ML; MG/100ML; MG/100ML
INJECTION, SOLUTION INTRAVENOUS CONTINUOUS PRN
Status: DISCONTINUED | OUTPATIENT
Start: 2024-07-22 | End: 2024-07-22 | Stop reason: SDUPTHER

## 2024-07-22 RX ORDER — PROPOFOL 10 MG/ML
INJECTION, EMULSION INTRAVENOUS PRN
Status: DISCONTINUED | OUTPATIENT
Start: 2024-07-22 | End: 2024-07-22 | Stop reason: SDUPTHER

## 2024-07-22 RX ORDER — LIDOCAINE HYDROCHLORIDE 20 MG/ML
INJECTION, SOLUTION INFILTRATION; PERINEURAL PRN
Status: DISCONTINUED | OUTPATIENT
Start: 2024-07-22 | End: 2024-07-22 | Stop reason: SDUPTHER

## 2024-07-22 RX ORDER — SODIUM CHLORIDE 0.9 % (FLUSH) 0.9 %
5-40 SYRINGE (ML) INJECTION PRN
Status: DISCONTINUED | OUTPATIENT
Start: 2024-07-22 | End: 2024-07-22 | Stop reason: HOSPADM

## 2024-07-22 RX ORDER — PHENYLEPHRINE HCL IN 0.9% NACL 1 MG/10 ML
SYRINGE (ML) INTRAVENOUS PRN
Status: DISCONTINUED | OUTPATIENT
Start: 2024-07-22 | End: 2024-07-22 | Stop reason: SDUPTHER

## 2024-07-22 RX ADMIN — SODIUM CHLORIDE, SODIUM LACTATE, POTASSIUM CHLORIDE, AND CALCIUM CHLORIDE: .6; .31; .03; .02 INJECTION, SOLUTION INTRAVENOUS at 15:53

## 2024-07-22 RX ADMIN — SODIUM CHLORIDE, PRESERVATIVE FREE 10 ML: 5 INJECTION INTRAVENOUS at 08:30

## 2024-07-22 RX ADMIN — LIDOCAINE HYDROCHLORIDE 80 MG: 20 INJECTION, SOLUTION INFILTRATION; PERINEURAL at 15:57

## 2024-07-22 RX ADMIN — APIXABAN 10 MG: 5 TABLET, FILM COATED ORAL at 20:46

## 2024-07-22 RX ADMIN — SODIUM CHLORIDE, POTASSIUM CHLORIDE, SODIUM LACTATE AND CALCIUM CHLORIDE: 600; 310; 30; 20 INJECTION, SOLUTION INTRAVENOUS at 14:27

## 2024-07-22 RX ADMIN — Medication 100 MCG: at 16:25

## 2024-07-22 RX ADMIN — SODIUM CHLORIDE, PRESERVATIVE FREE 10 ML: 5 INJECTION INTRAVENOUS at 20:46

## 2024-07-22 RX ADMIN — Medication 10 MG: at 20:46

## 2024-07-22 RX ADMIN — PANTOPRAZOLE SODIUM 40 MG: 40 TABLET, DELAYED RELEASE ORAL at 18:10

## 2024-07-22 RX ADMIN — PROPOFOL 250 MG: 10 INJECTION, EMULSION INTRAVENOUS at 15:57

## 2024-07-22 ASSESSMENT — PAIN SCALES - GENERAL
PAINLEVEL_OUTOF10: 5
PAINLEVEL_OUTOF10: 0

## 2024-07-22 ASSESSMENT — PAIN - FUNCTIONAL ASSESSMENT: PAIN_FUNCTIONAL_ASSESSMENT: 0-10

## 2024-07-22 NOTE — PROGRESS NOTES
Speech Language Pathology  Attempt Note     Name: Wes Yanez  : 1945  Medical Diagnosis: Weakness [R53.1]  Generalized weakness [R53.1]  Failure to thrive in adult [R62.7]  Stroke-like symptoms [R29.90]  Acute pulmonary embolism without acute cor pulmonale, unspecified pulmonary embolism type (HCC) [I26.99]      SLP attempted to see pt for dysphagia tx, reviewed pt's chart, spoke with RN.  Pt currently NPO for EGD later this date.      Of note, per MBSS completed on 24: \"Pt would likely benefit from repeat instrumental swallow study pending GI input/intervention\".  Consider repeat MBSS after completion of EGD.  ST to follow-up tomorrow, .      Mckenzie Cadena M.S. CCC-SLP  Speech-language pathologist  [phone]

## 2024-07-22 NOTE — PLAN OF CARE
Problem: Discharge Planning  Goal: Discharge to home or other facility with appropriate resources  Outcome: Progressing     Problem: Safety - Adult  Goal: Free from fall injury  Outcome: Progressing     Problem: Skin/Tissue Integrity  Goal: Absence of new skin breakdown  Description: 1.  Monitor for areas of redness and/or skin breakdown  Outcome: Progressing     Problem: Confusion  Goal: Confusion, delirium, dementia, or psychosis is improved or at baseline  Description: INTERVENTIONS:  1. Assess for possible contributors to thought disturbance, including medications, impaired vision or hearing, underlying metabolic abnormalities, dehydration, psychiatric diagnoses, and notify attending LIP  2. [place] high risk fall precautions, as indicated  3. Provide frequent short contacts to provide reality reorientation, refocusing and direction  4. Decrease environmental stimuli, including noise as appropriate  5. Monitor and intervene to maintain adequate nutrition, hydration, elimination, sleep and activity  6. If unable to ensure safety without constant attention obtain sitter and review sitter guidelines with assigned personnel  7. Initiate Psychosocial CNS and Spiritual Care consult, as indicated  7/21/2024 2100 by Lilo Huang RN  Outcome: Progressing  Flowsheets (Taken 7/21/2024 2100)  Effect of thought disturbance (confusion, delirium, dementia, or psychosis) are managed with adequate functional status:   Assess for contributors to thought disturbance, including medications, impaired vision or hearing, underlying metabolic abnormalities, dehydration, psychiatric diagnoses, notify LIP   [place] high risk fall precautions, as indicated   Provide frequent short contacts to provide reality reorientation, refocusing and direction   Decrease environmental stimuli, including noise as appropriate   Monitor and intervene to maintain adequate nutrition, hydration, elimination, sleep and activity

## 2024-07-22 NOTE — PERIOP NOTE
Report obtained from inpatient RN - Updated VS/  Return contact # provided to Rn for any changes or updates prior to procedure

## 2024-07-22 NOTE — H&P
Gastroenterology Preop Assessment    Patient:   Wes Yanez   :    1945   Facility:   [facility]  Referring/PCP: Michelle Lutz PA  Date:     2024    Subjective:   Procedure: EGD with possible dilation    HPI/Reason for procedure:  Dysphagia, abnormal barium swallow    Past Medical History:   Diagnosis Date    Elevated blood pressure reading without diagnosis of hypertension      Past Surgical History:   Procedure Laterality Date    FINGER AMPUTATION Right     middle finger    KNEE CARTILAGE SURGERY Left     SHOULDER SURGERY Right     bone spur removed.     THUMB AMPUTATION Left     tip of thumb       Social:   Social History     Tobacco Use    Smoking status: Never    Smokeless tobacco: Never   Substance Use Topics    Alcohol use: Yes     Alcohol/week: 0.0 - 2.0 standard drinks of alcohol     Family:   Family History   Problem Relation Age of Onset    Heart Disease Mother     High Cholesterol Mother     Heart Disease Father     Heart Disease Maternal Grandmother     High Blood Pressure Brother        Scheduled Medications:    sodium chloride flush  5-40 mL IntraVENous 2 times per day    aspirin  81 mg Oral Daily    atorvastatin  20 mg Oral Daily    citalopram  10 mg Oral Daily    sodium chloride flush  5-40 mL IntraVENous 2 times per day    [Held by provider] tamsulosin  0.4 mg Oral Daily       Current Medications:    Prior to Admission medications    Medication Sig Start Date End Date Taking? Authorizing Provider   tamsulosin (FLOMAX) 0.4 MG capsule Take 1 capsule by mouth daily 24   Michelle Lutz PA   citalopram (CELEXA) 10 MG tablet Take 1 tablet by mouth daily 24   Michelle Lutz PA   atorvastatin (LIPITOR) 10 MG tablet TAKE 1 TABLET BY MOUTH DAILY. 1/15/24   Michelle Lutz PA         Current Facility-Administered Medications:     naloxone 0.4 mg in 10 mL sodium chloride syringe, , IntraVENous, PRN, Jian Angelo MD    sodium chloride flush 0.9 % injection 5-40 mL,  proceed.       Ovidio Han MD  7/22/2024

## 2024-07-22 NOTE — PROGRESS NOTES
S/p EGD with esophageal dilation and biopsies. No signs of bleeding.   Findings of hiatal hernia, esophageal stricture, possible Barretts and moderate erosive duodenitis.     Can start diet per swallowing recommendations and PPI BID.     Anticoagulation can be restarted.

## 2024-07-22 NOTE — PROGRESS NOTES
Transported patient to inpatient room with this RN Per horace with side rails up x2  VSS  Resp unchanged from previous assessment  GI assessment unchanged   Pt in stable condition

## 2024-07-22 NOTE — PLAN OF CARE
Problem: Confusion  Goal: Confusion, delirium, dementia, or psychosis is improved or at baseline  Description: INTERVENTIONS:  1. Assess for possible contributors to thought disturbance, including medications, impaired vision or hearing, underlying metabolic abnormalities, dehydration, psychiatric diagnoses, and notify attending LIP  2. [place] high risk fall precautions, as indicated  3. Provide frequent short contacts to provide reality reorientation, refocusing and direction  4. Decrease environmental stimuli, including noise as appropriate  5. Monitor and intervene to maintain adequate nutrition, hydration, elimination, sleep and activity  6. If unable to ensure safety without constant attention obtain sitter and review sitter guidelines with assigned personnel  7. Initiate Psychosocial CNS and Spiritual Care consult, as indicated  7/21/2024 2100 by Lilo Huang RN  Outcome: Progressing  Flowsheets (Taken 7/21/2024 2100)  Effect of thought disturbance (confusion, delirium, dementia, or psychosis) are managed with adequate functional status:   Assess for contributors to thought disturbance, including medications, impaired vision or hearing, underlying metabolic abnormalities, dehydration, psychiatric diagnoses, notify LIP   [place] high risk fall precautions, as indicated   Provide frequent short contacts to provide reality reorientation, refocusing and direction   Decrease environmental stimuli, including noise as appropriate   Monitor and intervene to maintain adequate nutrition, hydration, elimination, sleep and activity

## 2024-07-22 NOTE — ANESTHESIA PRE PROCEDURE
650 mg Rectal Q6H PRN Jack Alejandra DO       • [Held by provider] tamsulosin (FLOMAX) capsule 0.4 mg  0.4 mg Oral Daily Jack Alejandra DO           Allergies:    Allergies   Allergen Reactions   • Tramadol Hcl (Er Biphasic) Other (See Comments)     Causes more pain that he had before.        Problem List:    Patient Active Problem List   Diagnosis Code   • External hemorrhoid K64.4   • Mixed hyperlipidemia E78.2   • Weakness R53.1       Past Medical History:        Diagnosis Date   • Elevated blood pressure reading without diagnosis of hypertension        Past Surgical History:        Procedure Laterality Date   • FINGER AMPUTATION Right     middle finger   • KNEE CARTILAGE SURGERY Left    • SHOULDER SURGERY Right     bone spur removed.    • THUMB AMPUTATION Left     tip of thumb       Social History:    Social History     Tobacco Use   • Smoking status: Never   • Smokeless tobacco: Never   Substance Use Topics   • Alcohol use: Yes     Alcohol/week: 0.0 - 2.0 standard drinks of alcohol                                Counseling given: Not Answered      Vital Signs (Current):   Vitals:    07/20/24 1945 07/21/24 0730 07/21/24 1945 07/22/24 0730   BP: 135/65 129/70 (!) 143/69 (!) 143/81   Pulse: 70 65 71 58   Resp: 17 20 20 18   Temp: 98.7 °F (37.1 °C) 98.2 °F (36.8 °C) 98 °F (36.7 °C) 97.5 °F (36.4 °C)   TempSrc: Oral Oral Oral Oral   SpO2: 93% 93% 95% 97%   Weight:       Height:                                                  BP Readings from Last 3 Encounters:   07/22/24 (!) 143/81   07/15/24 124/70   05/17/24 124/76       NPO Status:                                                                                 BMI:   Wt Readings from Last 3 Encounters:   07/18/24 98.8 kg (217 lb 12.8 oz)   07/15/24 72.6 kg (160 lb)   05/17/24 80.2 kg (176 lb 12.8 oz)     Body mass index is 29.54 kg/m².    CBC:   Lab Results   Component Value Date/Time    WBC 7.3 07/22/2024 06:23 AM    RBC 4.30 07/22/2024 06:23 AM    HGB 13.1

## 2024-07-22 NOTE — PROGRESS NOTES
Occupational Therapy  Attempted to see pt for OT follow-up. Per RN, pt is currently off the floor for EGD. Will re-attempt as schedule allows. Thank you.    Anni Henriquez, OTR/L

## 2024-07-22 NOTE — PROGRESS NOTES
V2.0    [facility] Progress Note      Name:  Wes Yanez /Age/Sex: 1945  (78 y.o. male)   MRN & CSN:  [id number] & [id number] Encounter Date/Time: 2024 7:27 AM EDT   Location:  0367/0367-01 PCP: Michelle Lutz PA     Attending:Serge Villalba MD       Hospital Day: 8    Assessment and Recommendations   Wes Yanez is a 78 y.o. male with pmh of possible BPH on Flomax, hyperlipidemia, Alzheimer's dementia who presents with Weakness    Interval History: No acute overnight events.  Patient was seen and examined at bedside this morning.  He denies any concerns or pain.    Plan:     Bilateral nonocclusive pulmonary embolism  -CT PE showed nonocclusive pulmonary emboli in the lower lobes  -Continue Eliquis 10 mg twice daily for 7 days followed by Eliquis 5 mg twice daily (held due to heparin for procedure. Plan to resume and finish 7 day course after EGD today)     Generalized weakness  Rule out CVA  -Patient presented with deficits consisting of generalized weakness, bilateral lower extremity weakness, tremors, inability to follow extraocular movements and commands on exam, although unsure if this is due to dementia  -Lipid panel within normal limits and A1c 5.6  -CT head 7/15 showed no acute intracranial abnormality, diffuse volume loss and mild chronic microvascular ischemic changes  -CTA head neck unremarkable and MRI brain showed no acute infarct  -Echo normal LV function with EF 61%  -Continue aspirin and atorvastatin  -PT/OT following    Dysphagia  -SLP eval   -MBSS showed residual contrast within hypopharynx following initial swallows  -GI consulted   -Barium esophagram showed silent aspiration with thickened barium   -EGD with possible dilation planned for today    Bilateral lower extremity weakness (resolved)  Urinary/fecal incontinence (resolved)  -Repeat episodes of urinary/fecal incontinence.  Unclear whether true incontinence or if patient's lower extremity weakness contributes to him    Component Value Date/Time    TSH 1.50 08/27/2015 10:00 AM     Troponin: No results found for: \"TROPONINT\"  Lactic Acid:   No results for input(s): \"LACTA\" in the last 72 hours.    BNP: No results for input(s): \"PROBNP\" in the last 72 hours.  UA:  Lab Results   Component Value Date/Time    NITRU NEGATIVE 08/27/2015 10:00 AM    COLORU Dark Yellow 07/15/2024 11:27 AM    COLORU YELLOW 08/27/2015 10:00 AM    PHUR 5.5 07/15/2024 11:27 AM    PHUR 7.0 08/27/2015 10:00 AM    CLARITYU cloudy 01/03/2023 12:18 PM    SPECGRAV >=1.030 07/15/2024 11:27 AM    LEUKOCYTESUR Negative 07/15/2024 11:27 AM    LEUKOCYTESUR NEGATIVE 08/27/2015 10:00 AM    UROBILINOGEN Normal 08/27/2015 10:00 AM    BILIRUBINUR Small 07/15/2024 11:27 AM    BLOODU Small 07/15/2024 11:27 AM    GLUCOSEU Negative 07/15/2024 11:27 AM    GLUCOSEU NEGATIVE 08/27/2015 10:00 AM    KETUA Trace 07/15/2024 11:27 AM    KETUA NEGATIVE 08/27/2015 10:00 AM     Urine Cultures:   Lab Results   Component Value Date/Time    LABURIN  07/15/2024 11:16 AM     <10,000 CFU/ml mixed skin/urogenital frandy. No further workup     Blood Cultures: No results found for: \"BC\"  No results found for: \"BLOODCULT2\"  Organism:   Lab Results   Component Value Date/Time    ORG Enterococcus faecalis 12/19/2022 11:51 AM     Jack Alejandra DO, PGY-2  [facility]

## 2024-07-23 ENCOUNTER — APPOINTMENT (OUTPATIENT)
Dept: GENERAL RADIOLOGY | Age: 79
End: 2024-07-23
Payer: MEDICARE

## 2024-07-23 VITALS
DIASTOLIC BLOOD PRESSURE: 73 MMHG | RESPIRATION RATE: 18 BRPM | SYSTOLIC BLOOD PRESSURE: 117 MMHG | BODY MASS INDEX: 29.5 KG/M2 | WEIGHT: 217.8 LBS | HEIGHT: 72 IN | TEMPERATURE: 98.1 F | HEART RATE: 80 BPM | OXYGEN SATURATION: 96 %

## 2024-07-23 LAB
ANION GAP SERPL CALCULATED.3IONS-SCNC: 11 MMOL/L (ref 3–16)
BASOPHILS # BLD: 0.1 K/UL (ref 0–0.2)
BASOPHILS NFR BLD: 0.8 %
BUN SERPL-MCNC: 12 MG/DL (ref 7–20)
CALCIUM SERPL-MCNC: 9 MG/DL (ref 8.3–10.6)
CHLORIDE SERPL-SCNC: 102 MMOL/L (ref 99–110)
CO2 SERPL-SCNC: 24 MMOL/L (ref 21–32)
CREAT SERPL-MCNC: <0.5 MG/DL (ref 0.8–1.3)
DEPRECATED RDW RBC AUTO: 14.2 % (ref 12.4–15.4)
EOSINOPHIL # BLD: 0.2 K/UL (ref 0–0.6)
EOSINOPHIL NFR BLD: 3 %
GFR SERPLBLD CREATININE-BSD FMLA CKD-EPI: >90 ML/MIN/{1.73_M2}
GLUCOSE SERPL-MCNC: 96 MG/DL (ref 70–99)
HCT VFR BLD AUTO: 40.3 % (ref 40.5–52.5)
HGB BLD-MCNC: 13.8 G/DL (ref 13.5–17.5)
LYMPHOCYTES # BLD: 1 K/UL (ref 1–5.1)
LYMPHOCYTES NFR BLD: 13.7 %
MCH RBC QN AUTO: 30.2 PG (ref 26–34)
MCHC RBC AUTO-ENTMCNC: 34.2 G/DL (ref 31–36)
MCV RBC AUTO: 88.1 FL (ref 80–100)
MONOCYTES # BLD: 0.6 K/UL (ref 0–1.3)
MONOCYTES NFR BLD: 7.6 %
NEUTROPHILS # BLD: 5.4 K/UL (ref 1.7–7.7)
NEUTROPHILS NFR BLD: 74.9 %
PLATELET # BLD AUTO: 367 K/UL (ref 135–450)
PMV BLD AUTO: 7 FL (ref 5–10.5)
POTASSIUM SERPL-SCNC: 4.1 MMOL/L (ref 3.5–5.1)
RBC # BLD AUTO: 4.58 M/UL (ref 4.2–5.9)
SODIUM SERPL-SCNC: 137 MMOL/L (ref 136–145)
WBC # BLD AUTO: 7.3 K/UL (ref 4–11)

## 2024-07-23 PROCEDURE — 85025 COMPLETE CBC W/AUTO DIFF WBC: CPT

## 2024-07-23 PROCEDURE — 80048 BASIC METABOLIC PNL TOTAL CA: CPT

## 2024-07-23 PROCEDURE — 74230 X-RAY XM SWLNG FUNCJ C+: CPT

## 2024-07-23 PROCEDURE — 92526 ORAL FUNCTION THERAPY: CPT

## 2024-07-23 PROCEDURE — 97535 SELF CARE MNGMENT TRAINING: CPT

## 2024-07-23 PROCEDURE — 97530 THERAPEUTIC ACTIVITIES: CPT

## 2024-07-23 PROCEDURE — 92611 MOTION FLUOROSCOPY/SWALLOW: CPT

## 2024-07-23 PROCEDURE — 6370000000 HC RX 637 (ALT 250 FOR IP)

## 2024-07-23 PROCEDURE — 6370000000 HC RX 637 (ALT 250 FOR IP): Performed by: INTERNAL MEDICINE

## 2024-07-23 PROCEDURE — 2580000003 HC RX 258

## 2024-07-23 RX ORDER — ASPIRIN 81 MG/1
81 TABLET, CHEWABLE ORAL DAILY
Qty: 30 TABLET | Refills: 0 | DISCHARGE
Start: 2024-07-24

## 2024-07-23 RX ORDER — PANTOPRAZOLE SODIUM 40 MG/1
40 TABLET, DELAYED RELEASE ORAL
Qty: 30 TABLET | Refills: 1 | DISCHARGE
Start: 2024-07-23

## 2024-07-23 RX ORDER — ATORVASTATIN CALCIUM 20 MG/1
20 TABLET, FILM COATED ORAL DAILY
Qty: 30 TABLET | Refills: 0 | DISCHARGE
Start: 2024-07-24

## 2024-07-23 RX ADMIN — APIXABAN 10 MG: 5 TABLET, FILM COATED ORAL at 09:31

## 2024-07-23 RX ADMIN — ASPIRIN 81 MG 81 MG: 81 TABLET ORAL at 09:31

## 2024-07-23 RX ADMIN — PANTOPRAZOLE SODIUM 40 MG: 40 TABLET, DELAYED RELEASE ORAL at 09:31

## 2024-07-23 RX ADMIN — ATORVASTATIN CALCIUM 20 MG: 10 TABLET, FILM COATED ORAL at 09:31

## 2024-07-23 RX ADMIN — CITALOPRAM HYDROBROMIDE 10 MG: 20 TABLET ORAL at 09:31

## 2024-07-23 RX ADMIN — SODIUM CHLORIDE, PRESERVATIVE FREE 10 ML: 5 INJECTION INTRAVENOUS at 09:31

## 2024-07-23 NOTE — PROGRESS NOTES
Comprehensive Nutrition Assessment    Type and Reason for Visit:  Initial, RD Nutrition Re-Screen/LOS    Nutrition Recommendations/Plan:   Continue pureed diet - textures and consistencies per SLP   Add Magic Cups BID - monitor acceptance   Encourage PO intakes as tolerated   Monitor nutrition adequacy, pertinent labs, bowel habits, wt changes, and clinical progress     Malnutrition Assessment:  Malnutrition Status:  At risk for malnutrition (07/23/24 1324)    Context:  Acute Illness     Findings of the 6 clinical characteristics of malnutrition:  Energy Intake:  Mild decrease in energy intake  Weight Loss:  No significant weight loss     Body Fat Loss:  No significant body fat loss     Muscle Mass Loss:  No significant muscle mass loss      Nutrition Assessment:    Pt seen for LOS. 77 yo male admitted with weakness. Hx of dementia and HLD. Pt nutritionally at risk AEB altered consistency diet. S/p MBS on 7/19, downgraded to pureed diet. S/p EGD with dilation on 7/22. SLP recommending repeat MBSS following EGD. Pt seen at lunch today, eating purees. Pt reports feeling hungry for lunch. Encouraged PO intakes for healing. RD to add ONS to promote PO intakes. No further nutrition questions or concerns at this time. Will monitor.    Nutrition Related Findings:    Labs reviewed. Active BS. BM on 7/18. Wound Type: None       Current Nutrition Intake & Therapies:    Average Meal Intake: 0%, 1-25%, %  Average Supplements Intake: None Ordered  ADULT DIET; Dysphagia - Pureed; No Drinking Straws    Anthropometric Measures:  Height: 182.9 cm (6')  Ideal Body Weight (IBW): 178 lbs (81 kg)    Admission Body Weight: 72.6 kg (160 lb) (standing scale)  Current Body Weight: 98.4 kg (217 lb) (likely inaccurate, 57 lb weight gain in 1 day), 121.9 % IBW. Weight Source: Standing Scale  Current BMI (kg/m2): 29.4                          BMI Categories: Overweight (BMI 25.0-29.9)    Estimated Daily Nutrient Needs:  Energy

## 2024-07-23 NOTE — PLAN OF CARE
Problem: Safety - Adult  Goal: Free from fall injury  Outcome: Progressing   Bed alarm on & in place. 2/4 side rails up. Pt wearing non skid footwear. Bed locked & in lowest position. Call light within reach. Avasure camera in place for safety.

## 2024-07-23 NOTE — PROGRESS NOTES
toileting (which includes using toilet, bedpan, or urinal)?: A Little  How much help is needed for putting on and taking off regular upper body clothing?: A Little  How much help is needed for taking care of personal grooming?: A Little  How much help for eating meals?: None  AM-[facility] Inpatient Daily Activity Raw Score: 18  AM-PAC Inpatient ADL T-Scale Score : 38.66  ADL Inpatient CMS 0-100% Score: 46.65  ADL Inpatient CMS G-Code Modifier : CK    Therapy Time   Individual Concurrent Group Co-treatment   Time In 0843         Time Out 0906         Minutes 23         Timed Code Treatment Minutes: 23 Minutes     Julia Jack OTR/L

## 2024-07-23 NOTE — DISCHARGE INSTR - COC
Continuity of Care Form    Patient Name: Wes Yanez   :  1945  MRN:  [id number]    Admit date:  7/15/2024  Discharge date:  24    Code Status Order: Limited   Advance Directives:   Advance Care Flowsheet Documentation       Date/Time Healthcare Directive Type of Healthcare Directive Copy in Chart Healthcare Agent Appointed Healthcare Agent's Name Healthcare Agent's Phone Number    24 1419 No, patient does not have an advance directive for healthcare treatment -- -- -- -- --            Admitting Physician:  Ha Horne DO  PCP: Michelle Lutz PA    Discharging Nurse: Wilfredo Guevara  Discharging Hospital Unit/Room#: 0367/0367-01  Discharging Unit Phone Number: [phone]    Emergency Contact:   Extended Emergency Contact Information  Primary Emergency Contact: Marko Yanez   [facility]  Home Phone: [phone]  Mobile Phone: [phone]  Relation: Child  Secondary Emergency Contact: Pavan Yanez  Home Phone: [phone]  Mobile Phone: [phone]  Relation: Child    Past Surgical History:  Past Surgical History:   Procedure Laterality Date    FINGER AMPUTATION Right     middle finger    KNEE CARTILAGE SURGERY Left     SHOULDER SURGERY Right     bone spur removed.     THUMB AMPUTATION Left     tip of thumb    UPPER GASTROINTESTINAL ENDOSCOPY N/A 2024    ESOPHAGOGASTRODUODENOSCOPY BIOPSY performed by Ovidio Han MD at [facility] ENDOSCOPY    UPPER GASTROINTESTINAL ENDOSCOPY  2024    ESOPHAGOGASTRODUODENOSCOPY DILATION SAVORY performed by Ovidio Han MD at [facility] ENDOSCOPY       Immunization History:   Immunization History   Administered Date(s) Administered    COVID-19, PFIZER PURPLE top, DILUTE for use, (age 12 y+), 30mcg/0.3mL 2021, 2021    DTaP vaccine 2008    Influenza, High Dose (Fluzone 65 yrs and older) 2017, 2019    Pneumococcal Conjugate 7-valent (Prevnar7) 2008    Pneumococcal, PCV-13,

## 2024-07-23 NOTE — PROGRESS NOTES
SLP reviewed rationale for current modified diet recommendation, previous MBSS results/recs.  Pt does not demonstrate evidence of learning (pt has baseline dementia per chart).   Noted intermittent throat clear with observed PO trials.  SLP provided intermittent use of empty tsp (or trace amount of puree bolus on tsp) to facilitate swallow initiation / second swallow in an effort to clear possible pharyngeal residue post-swallow with puree trials.    Pt unable to successfully follow commands to complete purposeful cough with PO despite max multimodality cues.  Continue current diet at this time with adherence to strict aspiration precautions and 1:1 supervision with meals.  Pt may benefit from repeat MBSS s/p EGD -- will defer to MD.  RN aware.  ST to continue to follow.         Dysphagia Goals:  Long-term Goals  Timeframe for Long-term Goals: addend to 7 days, 7/30/24  Goal 1: The pt will tolerate safest and least restrictive diet without clinical s/s of aspiration or change in respiratory status  7/23/2024: Goal addressed, see above. Ongoing, progressing.  Continue current diet.     Short-term Goals  Timeframe for Short-term Goals: addend to 5 days, 7/28/24  Goal 1: The patient will tolerate recommended diet without observed clinical signs of aspiration  7/23/2024: Goal addressed, see above. Ongoing, progressing.   Goal 2: The patient/caregiver will demonstrate understanding of compensatory strategies for improved swallowing safety  7/23/2024: Goal addressed, see above. Ongoing, not progressing.  No family/caregiver present for education at this time.  Goal 3: The patient will tolerate instrumental swallowing procedure. Goal met 07/16 (initial MBSS); pt would likely benefit from repeat instrumental swallow study s/p EGD -- will defer to MD    Speech/Language/Cog Goals: N/A    Recommendations:  Solid Consistency: IDDSI 4 Puree Solids  Liquid Consistency: IDDSI 0 Thin Liquids via small, single sips  Medication:

## 2024-07-23 NOTE — PROCEDURES
Speech Language Pathology  Modified Barium Swallow Study  Facility/Department: [facility] B3 - MED SURG        Recommendations:  Diet recommendation: IDDSI 4 Puree Solids; IDDSI 0 Thin Liquids via small, single sip; Meds PO as tolerated  Risk management: upright for all intake, stay upright for at least 30 mins after intake, small bites/sips, intermittent use of empty tsp to facilitate swallow initiation/second swallow with PO, 1:1 supervision with intake, cued purposeful cough every 2-3 bites/sips as able, oral care q4 hrs to reduce adverse affects in the event of aspiration, increase physical mobility as able, alternate bites/sips, slow rate of intake, general GERD precautions, general aspiration precautions, and hold PO and contact SLP if s/s of aspiration or worsening respiratory status develop.    Again, noted significant residue at bilateral pyriforms and UES post-swallow with all PO (as noted during initial MBSS on .  Pt unable to follow majority of strategies in an attempt to clear this residue despite cues (I.e. cued double swallow, head turn).  Noted increased residue with textures of increased viscosity (I.e. puree vs thin liquid).  Instances of trace aspiration observed post-swallow d/t presence of this residue.    Given pt currently on RA, WBC considered WNL, and no hx of dysphagia -- recommend continuing PO diet at this time.  However, pt is considered at an increased risk of aspiration with all PO intake, recommend adherence to strict aspiration precautions with all PO intake, increased frequency of oral care, and 1:1 supervision/cues during meals for use of safe swallow strategies.    If pt has overt s/s of aspiration or change in respiratory status, recommend downgrade to NPO pending re-assessment by ST    Continued dysphagia tx recommended after d/c for diet tolerance monitoring      NAME:Wes Yanez  : 1945 (78 y.o.)   MRN: [id number]  ROOM: 0367/0367-01  ADMISSION DATE:  All  Pharyngeal Residue - UES: All  Pharyngeal Wall - Weakness: All  Fatigue of Mechanism: All  Pharyngeal phase comment:   No penetration/aspiration with initial thin liquid trials (tsp, cup, straw) in isolation (prior to puree).  However, once thin liquids introduced as liquid wash in an effort to clear pharyngeal residue from puree trials (severe at valleculae noted initially), episode of trace silent aspiration observed during the swallow (noted only after [id number] review after completion of study).    Reduced hyolaryngeal excursion, reduced cricopharyngeal opening, reduced pharyngeal peristalsis, and reduced tongue base retraction result in diffuse pharyngeal residue post-swallow with all PO (increased with textures of increased viscosity, I.e. puree vs thin).  Severe valleculae residue noted post-swallow with puree initially -- pt unable to successfully follow strategies in an effort to clear this residue (I.e. cued effortful swallow, double swallow, and/or head turn left).  Use of empty tsp/trace puree bolus on tsp also trialed in an attempt to facilitate swallow initiation, successful at swallow initiation in >50% of instances and minimal pharyngeal residue cleared.    Although episode of trace aspiration noted with thin liquid via cup when utilized as a liquid wash to clear pharyngeal residue from puree, recommend continuing thin liquids at this time via small, single sips.    Pt continues to be considered at an increased risk of aspiration with all PO intake, recommend adherence to strict aspiration precautions with all PO intake, increased frequency of oral care, and 1:1 supervision/cues during meals for use of safe swallow strategies.    Suspect increased pharyngeal residue with thickened liquids (evident on initial MBSS completed 7/17) with increased risk of penetration and/or aspiration after the swallow (mildly thick liquids not trialed during today's study), and should the patient experience

## 2024-07-23 NOTE — DISCHARGE INSTR - DIET

## 2024-07-23 NOTE — PROGRESS NOTES
Patient discharged to SNF. IV removed with no complications, telemetry removed [facility] notified. Discharge education complete with verbal understanding. All belongings sent home with patient. Patient transported via medical transport

## 2024-07-23 NOTE — PLAN OF CARE
Problem: Discharge Planning  Goal: Discharge to home or other facility with appropriate resources  Outcome: Progressing     Problem: Safety - Adult  Goal: Free from fall injury  7/23/2024 1456 by Wilfredo Guevara, RN  Outcome: Progressing  7/23/2024 0118 by Nely Luna, RN  Outcome: Progressing     Problem: Skin/Tissue Integrity  Goal: Absence of new skin breakdown  Description: 1.  Monitor for areas of redness and/or skin breakdown  2.  Assess vascular access sites hourly  3.  Every 4-6 hours minimum:  Change oxygen saturation probe site  4.  Every 4-6 hours:  If on nasal continuous positive airway pressure, respiratory therapy assess nares and determine need for appliance change or resting period.  Outcome: Progressing

## 2024-07-23 NOTE — PROGRESS NOTES
PROGRESS NOTE    HPI: Wes Yanez is a(n)78 y.o. male admitted for work-up and treatment for Weakness [R53.1]  Generalized weakness [R53.1]  Failure to thrive in adult [R62.7]  Stroke-like symptoms [R29.90]  Acute pulmonary embolism without acute cor pulmonale, unspecified pulmonary embolism type (HCC) [I26.99].     We are following for dysphagia.    Subjective:     No acute events overnight.  No family at bedside.      Objective:     I/O last 3 completed shifts:  In: 1020 [P.O.:120; I.V.:900]  Out: 300 [Urine:300]      /77   Pulse 65   Temp 97.7 °F (36.5 °C) (Oral)   Resp 18   Ht 1.829 m (6')   Wt 98.8 kg (217 lb 12.8 oz)   SpO2 94%   BMI 29.54 kg/m²     Physical Exam:  HEENT: anicteric sclera, oropharyngeal membranes pink and moist.  Cor: RRR  Lungs: non-labored, no respiratory distress  Abdomen: soft, NT. No ascites.  No hepatomegaly or splenomegaly  Extremities: no edema  Neuro: alert and oriented to person.      Results:   Lab Results   Component Value Date    ALT 12 07/15/2024    AST 17 07/15/2024    ALKPHOS 100 07/15/2024    BILIDIR <0.2 01/04/2019     Lab Results   Component Value Date    WBC 7.3 07/23/2024    HGB 13.8 07/23/2024    HCT 40.3 (L) 07/23/2024    MCV 88.1 07/23/2024     07/23/2024     BUN/Cr/glu/ALT/AST/amyl/lip:  12/<0.5/--/--/--/--/-- (07/23 0632)  FL MODIFIED BARIUM SWALLOW W VIDEO    Result Date: 7/17/2024  1. Visualization of residual contrast material within the hypopharynx following initial swallows throughout the examination. 2. No evidence of deep penetration of the larynx or aspiration. Please see separate speech pathology report for full discussion of findings and recommendations.     CTA HEAD NECK W CONTRAST    Result Date: 7/16/2024  No flow limiting stenosis or large vessel occlusion detected within the head or neck.     MRI BRAIN WO CONTRAST    Result Date: 7/16/2024  No acute infarct.     MRI LUMBAR SPINE

## 2024-07-23 NOTE — DISCHARGE SUMMARY
V2.0  Discharge Summary    Name:  Wes Yanez /Age/Sex: 1945 (78 y.o. male)   Admit Date: 7/15/2024  Discharge Date: 24    MRN & CSN:  [id number] & [id number] Encounter Date and Time 24 2:20 PM EDT    Attending:  Serge Villalba MD Discharging Provider: Meliton Gold DO       [facility] Course:     Brief HPI: Wes Yanez is a 78 y.o. male with pmh of possible BPH on Flomax, hyperlipidemia, Alzheimer's dementia who presents with generalized weakness.  Patient reports that he had an episode of urine and fecal incontinence today.  It is unclear whether patient's incontinence is due to his bilateral leg weakness causing him to be unable to get out of bed or whether it is true incontinence.  Patient's son reports that patient has been active and normally functioning up until this past weekend.  Patient was previously able to walk around and exercise on his own as well as play golf.  Son states that patient has not been able to ambulate and has not been drinking and eating well.  He reports another episode 1 and half months ago of fecal incontinence.  Patient's son reports new onset bilateral hand tremor and bilateral lower extremity shaking.  Patient's son is not aware of any fever, chills, chest pain, shortness of breath that patient has been complaining of.  ED workup showed WBC 14.1, sodium 132, troponin 36 with repeat 51, normal lactate, normal CK.  POCT UA was unremarkable with repeat UA pending.  Chest x-ray showed streaky airspace opacity of the left lung base possibly representing atelectasis versus developing pneumonia.  CT PE showed nonocclusive pulmonary emboli in the lower lobes.  CT abdomen pelvis showed moderate amount of stool in the colon. Patient was admitted for weakness, CVA rule out, pneumonia management.     Brief Problem Based Course:   Bilateral nonocclusive pulmonary embolism  -CT PE showed nonocclusive pulmonary emboli in the lower lobes  -Continue Eliquis  The lung apices are clear.  No cervical or superior mediastinal lymphadenopathy.  The larynx and pharynx are unremarkable.  No acute abnormality of the salivary and thyroid glands. BONES: No acute osseous abnormality. CTA HEAD: ANTERIOR CIRCULATION: No significant stenosis of the intracranial internal carotid, anterior cerebral, or middle cerebral arteries. No aneurysm. POSTERIOR CIRCULATION: No significant stenosis of the vertebral, basilar, or posterior cerebral arteries. No aneurysm. OTHER: No dural venous sinus thrombosis on this non-dedicated study. BRAIN: See separately dictated noncontrast head CT report.     No flow limiting stenosis or large vessel occlusion detected within the head or neck.     MRI BRAIN WO CONTRAST    Result Date: 7/16/2024  EXAMINATION: MRI OF THE BRAIN WITHOUT CONTRAST  7/16/2024 6:05 pm TECHNIQUE: Multiplanar multisequence MRI of the brain was performed without the administration of intravenous contrast. COMPARISON: None. HISTORY: ORDERING SYSTEM PROVIDED HISTORY: CVA workup TECHNOLOGIST PROVIDED HISTORY: Reason for exam:->CVA workup Reason for Exam: CVA workup FINDINGS: INTRACRANIAL STRUCTURES/VENTRICLES: There is no acute infarct. No mass effect or midline shift. No evidence of an acute intracranial hemorrhage.  The ventricles and sulci are normal in size and configuration.  The sellar/suprasellar regions appear unremarkable.  The normal signal voids within the major intracranial vessels appear maintained. ORBITS: The visualized portion of the orbits demonstrate no acute abnormality. SINUSES: There is polypoid mucosal thickening of the paranasal sinuses. BONES/SOFT TISSUES: The bone marrow signal intensity appears normal. The soft tissues demonstrate no acute abnormality.     No acute infarct.       CBC:   Recent Labs     07/21/24  0622 07/22/24  0623 07/23/24  0632   WBC 8.2 7.3 7.3   HGB 12.9* 13.1* 13.8    339 [id number]     BMP:    Recent Labs     07/21/24  0622 07/22/24  0623

## 2024-07-23 NOTE — CARE COORDINATION
CASE MANAGEMENT DISCHARGE SUMMARY      Discharge to: [facility]    Hospital Exemption Notification (HENS) completed: [id number]     IMM given: 7/23/2024    Transportation:       Medical Transport explained to pt/family. Pt/family voice no agency preference.    Agency used: [facility]   time: 1600   Ambulance form completed: Yes    Confirmed discharge plan with:     Patient: yes     Family:  yes, Marko aware     Facility/Agency, name:  [facility] faxed   Phone number for report to facility: [phone]     RN, name: Wilfredo    Note: Discharging nurse to complete [facility], reconcile [facility], and place final copy with patient's discharge packet. RN to ensure that written prescriptions for  Level II medications are sent with patient to the facility as per protocol.    Flaca Padilla RN

## 2024-07-24 ENCOUNTER — TELEPHONE (OUTPATIENT)
Dept: FAMILY MEDICINE CLINIC | Age: 79
End: 2024-07-24

## 2024-07-24 NOTE — TELEPHONE ENCOUNTER
Care Transitions Initial Follow Up Call    Outreach made within 2 business days of discharge: Yes    Patient: Wes Yanez Patient : 1945   MRN: [id number]  Reason for Admission: There are no discharge diagnoses documented for the most recent discharge.  Discharge Date: 24       Spoke with: son Marko who is the POA    Discharge department/facility: [facility] Interactive Patient Contact:  Was patient able to fill all prescriptions: Yes  Was patient instructed to bring all medications to the follow-up visit: Yes  Is patient taking all medications as directed in the discharge summary? Yes  Does patient understand their discharge instructions: Yes  Does patient have questions or concerns that need addressed prior to 7-14 day follow up office visit: no    Scheduled appointment with PCP within 7-14 days, pt's son states he was transferred to the skilled nursing side of the assisted living facility where he normally resides, he declined a follow up appt at this time stating that if patient  is unable to return to the independent living side he will no longer be followed by Michelle but will have their physicians take over his care.    Follow Up  No future appointments.    Dio Cuello MA

## 2024-07-24 NOTE — ANESTHESIA POSTPROCEDURE EVALUATION
Department of Anesthesiology  Postprocedure Note    Patient: Wes Yanez  MRN: [id number]  YOB: 1945  Date of evaluation: 7/24/2024    Procedure Summary       Date: 07/22/24 Room / Location: [place] [id number] / [facility]    Anesthesia Start: 1553 Anesthesia Stop: 1626    Procedures:       ESOPHAGOGASTRODUODENOSCOPY BIOPSY      ESOPHAGOGASTRODUODENOSCOPY DILATION SAVORY Diagnosis:       Dysphagia, unspecified type      (Dysphagia, unspecified type [R13.10])    Surgeons: Ovidio Han MD Responsible Provider: Jian Angelo MD    Anesthesia Type: MAC ASA Status: 2            Anesthesia Type: No value filed.    Muriel Phase I: Muriel Score: 9    Muriel Phase II: Muriel Score: 6    Anesthesia Post Evaluation    Patient location during evaluation: PACU  Level of consciousness: awake  Airway patency: patent  Nausea & Vomiting: no vomiting  Cardiovascular status: blood pressure returned to baseline  Respiratory status: acceptable  Hydration status: stable  Pain management: adequate    No notable events documented.

## 2024-08-02 RX ORDER — CITALOPRAM HYDROBROMIDE 10 MG/1
10 TABLET ORAL DAILY
Qty: 30 TABLET | Refills: 1 | Status: SHIPPED | OUTPATIENT
Start: 2024-08-02

## 2024-08-02 NOTE — TELEPHONE ENCOUNTER
Wes Yanez [phone] (home)    is requesting refill(s) of medication Citalopram 10 mg Tablet to preferred pharmacy [facility]    Last OV 05/17/24 (pertaining to medication)   Last refill 07/15/24 (per medication requested)  Next office visit scheduled or attempted No

## 2024-08-14 ENCOUNTER — TELEPHONE (OUTPATIENT)
Dept: FAMILY MEDICINE CLINIC | Age: 79
End: 2024-08-14

## 2024-08-14 NOTE — TELEPHONE ENCOUNTER
Kylie from [facility] in [place] was asking for refill on Atorvastatin. Pt was recently in [facility] from 07/15/24 to 07/23/24. Pt was prescribed Atorvastatin 20 mg on 07/24/24. Pt was prescribed Atorvastatin 10 mg tablet prior to hospital visit by JOSE D Carney. When pt's son was contacted for hospital follow up on 07/24/24. Pt son stated that pt will be transferred to assisted living facility where their physician will take over pt's care. Pt's son states pt will no longer need to be followed by JOSE D Godoy. Can JOSE D Carney refill pt's Atorvastatin or does pharmacy need to contact pt's assisted living facility physicians?

## 2024-08-15 NOTE — TELEPHONE ENCOUNTER
Pt's son was made aware that physicians at the living assisted need to refill these medications since they are taking over pt's care. Pt's son will call assisted living facility to refill medications.

## 2024-10-01 RX ORDER — TAMSULOSIN HYDROCHLORIDE 0.4 MG/1
0.4 CAPSULE ORAL DAILY
Qty: 90 CAPSULE | Refills: 1 | OUTPATIENT
Start: 2024-10-01

## (undated) DEVICE — ENDOSCOPIC KIT 2 12 FT OP4 DE2 GWN SYR

## (undated) DEVICE — ELECTRODE,ECG,STRESS,FOAM,3PK: Brand: MEDLINE

## (undated) DEVICE — FORCEPS BX L240CM DIA2.4MM L NDL RAD JAW 4 133340

## (undated) DEVICE — CONMED SCOPE SAVER BITE BLOCK, 20X27 MM: Brand: SCOPE SAVER

## (undated) DEVICE — GUIDEWIRE WITH SPRING TIP - SINGLE USE: Brand: SAFEGUIDE®

## (undated) DEVICE — CANNULA NSL L4M O2 AD FILTERLINE